# Patient Record
Sex: FEMALE | Race: WHITE | NOT HISPANIC OR LATINO | Employment: OTHER | URBAN - METROPOLITAN AREA
[De-identification: names, ages, dates, MRNs, and addresses within clinical notes are randomized per-mention and may not be internally consistent; named-entity substitution may affect disease eponyms.]

---

## 2017-05-24 ENCOUNTER — ALLSCRIPTS OFFICE VISIT (OUTPATIENT)
Dept: OTHER | Facility: OTHER | Age: 67
End: 2017-05-24

## 2017-05-25 RX ORDER — SODIUM CHLORIDE 9 MG/ML
20 INJECTION, SOLUTION INTRAVENOUS ONCE
Status: DISCONTINUED | OUTPATIENT
Start: 2017-05-30 | End: 2017-06-02 | Stop reason: HOSPADM

## 2017-05-30 ENCOUNTER — HOSPITAL ENCOUNTER (OUTPATIENT)
Dept: INFUSION CENTER | Facility: HOSPITAL | Age: 67
Discharge: HOME | End: 2017-05-30
Payer: COMMERCIAL

## 2017-06-02 RX ORDER — SODIUM CHLORIDE 9 MG/ML
20 INJECTION, SOLUTION INTRAVENOUS ONCE
Status: COMPLETED | OUTPATIENT
Start: 2017-06-05 | End: 2017-06-05

## 2017-06-05 ENCOUNTER — HOSPITAL ENCOUNTER (OUTPATIENT)
Dept: INFUSION CENTER | Facility: HOSPITAL | Age: 67
Discharge: HOME | End: 2017-06-05
Payer: MEDICARE

## 2017-06-05 VITALS
OXYGEN SATURATION: 96 % | DIASTOLIC BLOOD PRESSURE: 75 MMHG | SYSTOLIC BLOOD PRESSURE: 135 MMHG | RESPIRATION RATE: 16 BRPM | TEMPERATURE: 98.3 F | HEART RATE: 67 BPM

## 2017-06-05 PROCEDURE — 96374 THER/PROPH/DIAG INJ IV PUSH: CPT

## 2017-06-05 RX ADMIN — IRON SUCROSE 200 MG: 20 INJECTION, SOLUTION INTRAVENOUS at 11:01

## 2017-06-05 RX ADMIN — SODIUM CHLORIDE 20 ML/HR: 9 INJECTION, SOLUTION INTRAVENOUS at 11:01

## 2017-06-12 ENCOUNTER — HOSPITAL ENCOUNTER (OUTPATIENT)
Dept: INFUSION CENTER | Facility: HOSPITAL | Age: 67
Discharge: HOME | End: 2017-06-12
Payer: MEDICARE

## 2017-06-12 VITALS
OXYGEN SATURATION: 94 % | TEMPERATURE: 97.9 F | HEART RATE: 79 BPM | DIASTOLIC BLOOD PRESSURE: 57 MMHG | RESPIRATION RATE: 16 BRPM | SYSTOLIC BLOOD PRESSURE: 111 MMHG

## 2017-06-12 PROCEDURE — 96374 THER/PROPH/DIAG INJ IV PUSH: CPT

## 2017-06-12 RX ADMIN — IRON SUCROSE 200 MG: 20 INJECTION, SOLUTION INTRAVENOUS at 10:45

## 2017-06-19 ENCOUNTER — HOSPITAL ENCOUNTER (OUTPATIENT)
Dept: INFUSION CENTER | Facility: HOSPITAL | Age: 67
Discharge: HOME | End: 2017-06-19
Payer: MEDICARE

## 2017-06-19 VITALS
HEART RATE: 62 BPM | TEMPERATURE: 96.9 F | SYSTOLIC BLOOD PRESSURE: 129 MMHG | OXYGEN SATURATION: 98 % | RESPIRATION RATE: 16 BRPM | DIASTOLIC BLOOD PRESSURE: 78 MMHG

## 2017-06-19 PROCEDURE — 96374 THER/PROPH/DIAG INJ IV PUSH: CPT

## 2017-06-19 RX ADMIN — IRON SUCROSE 200 MG: 20 INJECTION, SOLUTION INTRAVENOUS at 11:02

## 2017-06-26 ENCOUNTER — APPOINTMENT (EMERGENCY)
Dept: RADIOLOGY | Facility: HOSPITAL | Age: 67
End: 2017-06-26
Payer: MEDICARE

## 2017-06-26 ENCOUNTER — HOSPITAL ENCOUNTER (OUTPATIENT)
Dept: INFUSION CENTER | Facility: HOSPITAL | Age: 67
Discharge: HOME | End: 2017-06-26
Payer: MEDICARE

## 2017-06-26 ENCOUNTER — HOSPITAL ENCOUNTER (EMERGENCY)
Facility: HOSPITAL | Age: 67
Discharge: HOME | End: 2017-06-26
Admitting: EMERGENCY MEDICINE
Payer: MEDICARE

## 2017-06-26 VITALS
DIASTOLIC BLOOD PRESSURE: 86 MMHG | SYSTOLIC BLOOD PRESSURE: 192 MMHG | RESPIRATION RATE: 20 BRPM | HEART RATE: 56 BPM | TEMPERATURE: 97.4 F | OXYGEN SATURATION: 97 %

## 2017-06-26 VITALS
SYSTOLIC BLOOD PRESSURE: 142 MMHG | RESPIRATION RATE: 16 BRPM | DIASTOLIC BLOOD PRESSURE: 69 MMHG | HEART RATE: 50 BPM | TEMPERATURE: 97.5 F | OXYGEN SATURATION: 96 %

## 2017-06-26 DIAGNOSIS — M54.50 ACUTE LUMBAR BACK PAIN: ICD-10-CM

## 2017-06-26 DIAGNOSIS — M48.061 FORAMINAL STENOSIS OF LUMBAR REGION: ICD-10-CM

## 2017-06-26 DIAGNOSIS — M51.26 PROTRUSION OF LUMBAR INTERVERTEBRAL DISC: Primary | ICD-10-CM

## 2017-06-26 PROCEDURE — 99283 EMERGENCY DEPT VISIT LOW MDM: CPT

## 2017-06-26 PROCEDURE — 96372 THER/PROPH/DIAG INJ SC/IM: CPT

## 2017-06-26 PROCEDURE — 96374 THER/PROPH/DIAG INJ IV PUSH: CPT

## 2017-06-26 PROCEDURE — 72131 CT LUMBAR SPINE W/O DYE: CPT

## 2017-06-26 RX ORDER — TRAMADOL HYDROCHLORIDE 50 MG/1
50 TABLET ORAL EVERY 8 HOURS PRN
Qty: 15 TABLET | Refills: 0 | Status: SHIPPED | OUTPATIENT
Start: 2017-06-26 | End: 2017-07-01

## 2017-06-26 RX ORDER — KETOROLAC TROMETHAMINE 30 MG/ML
30 INJECTION, SOLUTION INTRAMUSCULAR; INTRAVENOUS ONCE
Status: COMPLETED | OUTPATIENT
Start: 2017-06-26 | End: 2017-06-26

## 2017-06-26 RX ORDER — CYCLOBENZAPRINE HCL 10 MG
10 TABLET ORAL ONCE
Status: COMPLETED | OUTPATIENT
Start: 2017-06-26 | End: 2017-06-26

## 2017-06-26 RX ORDER — CYCLOBENZAPRINE HCL 10 MG
10 TABLET ORAL 3 TIMES DAILY PRN
Qty: 21 TABLET | Refills: 0 | Status: SHIPPED | OUTPATIENT
Start: 2017-06-26 | End: 2017-08-09

## 2017-06-26 RX ORDER — BACLOFEN 10 MG/1
10 TABLET ORAL DAILY
COMMUNITY
End: 2018-05-17

## 2017-06-26 RX ORDER — PREDNISONE 50 MG/1
50 TABLET ORAL DAILY
Qty: 5 TABLET | Refills: 0 | Status: SHIPPED | OUTPATIENT
Start: 2017-06-26 | End: 2017-07-01

## 2017-06-26 RX ADMIN — KETOROLAC TROMETHAMINE 30 MG: 30 INJECTION, SOLUTION INTRAMUSCULAR at 12:17

## 2017-06-26 RX ADMIN — IRON SUCROSE 200 MG: 20 INJECTION, SOLUTION INTRAVENOUS at 10:40

## 2017-06-26 RX ADMIN — CYCLOBENZAPRINE HYDROCHLORIDE 10 MG: 10 TABLET, FILM COATED ORAL at 12:16

## 2017-06-26 NOTE — PROGRESS NOTES
AFTER VENOFER TREATMENT, PT BROUGHT TO ED TRIAGE VIA WC FOR EVALUATION OF BACK PAIN  IV LEFT IN PLACE PER ED CHARGE NURSEALMA DELIA

## 2017-06-30 ENCOUNTER — ALLSCRIPTS OFFICE VISIT (OUTPATIENT)
Dept: OTHER | Facility: OTHER | Age: 67
End: 2017-06-30

## 2017-07-03 ENCOUNTER — HOSPITAL ENCOUNTER (OUTPATIENT)
Dept: INFUSION CENTER | Facility: HOSPITAL | Age: 67
Discharge: HOME | End: 2017-07-03
Payer: MEDICARE

## 2017-07-03 VITALS
SYSTOLIC BLOOD PRESSURE: 178 MMHG | TEMPERATURE: 98.1 F | DIASTOLIC BLOOD PRESSURE: 79 MMHG | HEART RATE: 71 BPM | RESPIRATION RATE: 16 BRPM | OXYGEN SATURATION: 96 %

## 2017-07-03 PROCEDURE — 96374 THER/PROPH/DIAG INJ IV PUSH: CPT

## 2017-07-03 RX ADMIN — IRON SUCROSE 200 MG: 20 INJECTION, SOLUTION INTRAVENOUS at 14:07

## 2017-07-05 ENCOUNTER — ALLSCRIPTS OFFICE VISIT (OUTPATIENT)
Dept: OTHER | Facility: OTHER | Age: 67
End: 2017-07-05

## 2017-07-05 DIAGNOSIS — G89.4 CHRONIC PAIN SYNDROME: ICD-10-CM

## 2017-07-05 DIAGNOSIS — M51.36 OTHER INTERVERTEBRAL DISC DEGENERATION, LUMBAR REGION: ICD-10-CM

## 2017-07-05 DIAGNOSIS — M48.061 SPINAL STENOSIS OF LUMBAR REGION: ICD-10-CM

## 2017-07-05 DIAGNOSIS — M54.50 LOW BACK PAIN: ICD-10-CM

## 2017-07-05 DIAGNOSIS — M54.16 RADICULOPATHY OF LUMBAR REGION: ICD-10-CM

## 2017-07-10 ENCOUNTER — HOSPITAL ENCOUNTER (OUTPATIENT)
Dept: INFUSION CENTER | Facility: HOSPITAL | Age: 67
Discharge: HOME | End: 2017-07-10
Payer: MEDICARE

## 2017-07-10 VITALS
RESPIRATION RATE: 16 BRPM | TEMPERATURE: 97.5 F | DIASTOLIC BLOOD PRESSURE: 76 MMHG | SYSTOLIC BLOOD PRESSURE: 161 MMHG | OXYGEN SATURATION: 97 % | HEART RATE: 67 BPM

## 2017-07-10 PROCEDURE — 96374 THER/PROPH/DIAG INJ IV PUSH: CPT

## 2017-07-10 RX ADMIN — IRON SUCROSE 200 MG: 20 INJECTION, SOLUTION INTRAVENOUS at 11:07

## 2017-07-13 ENCOUNTER — HOSPITAL ENCOUNTER (OUTPATIENT)
Dept: RADIOLOGY | Facility: HOSPITAL | Age: 67
Setting detail: OUTPATIENT SURGERY
Discharge: HOME | End: 2017-07-13
Payer: MEDICARE

## 2017-07-13 ENCOUNTER — HOSPITAL ENCOUNTER (OUTPATIENT)
Facility: AMBULARY SURGERY CENTER | Age: 67
Setting detail: OUTPATIENT SURGERY
Discharge: HOME | End: 2017-07-13
Attending: ANESTHESIOLOGY | Admitting: ANESTHESIOLOGY
Payer: MEDICARE

## 2017-07-13 VITALS
RESPIRATION RATE: 18 BRPM | DIASTOLIC BLOOD PRESSURE: 94 MMHG | TEMPERATURE: 96.6 F | HEART RATE: 63 BPM | SYSTOLIC BLOOD PRESSURE: 193 MMHG | OXYGEN SATURATION: 96 %

## 2017-07-13 PROBLEM — M51.36 OTHER INTERVERTEBRAL DISC DEGENERATION, LUMBAR REGION: Status: ACTIVE | Noted: 2017-07-13

## 2017-07-13 PROBLEM — M48.061 SPINAL STENOSIS OF LUMBAR REGION: Status: ACTIVE | Noted: 2017-07-13

## 2017-07-13 PROBLEM — M54.16 RADICULOPATHY OF LUMBAR REGION: Status: ACTIVE | Noted: 2017-07-13

## 2017-07-13 PROBLEM — G89.4 CHRONIC PAIN SYNDROME: Status: ACTIVE | Noted: 2017-07-13

## 2017-07-13 PROBLEM — M54.50 LOW BACK PAIN: Status: ACTIVE | Noted: 2017-07-13

## 2017-07-13 PROBLEM — M51.369 OTHER INTERVERTEBRAL DISC DEGENERATION, LUMBAR REGION: Status: ACTIVE | Noted: 2017-07-13

## 2017-07-13 PROCEDURE — 72020 X-RAY EXAM OF SPINE 1 VIEW: CPT

## 2017-07-13 RX ORDER — METHYLPREDNISOLONE ACETATE 80 MG/ML
INJECTION, SUSPENSION INTRA-ARTICULAR; INTRALESIONAL; INTRAMUSCULAR; SOFT TISSUE AS NEEDED
Status: DISCONTINUED | OUTPATIENT
Start: 2017-07-13 | End: 2017-07-13 | Stop reason: HOSPADM

## 2017-07-17 ENCOUNTER — HOSPITAL ENCOUNTER (OUTPATIENT)
Dept: INFUSION CENTER | Facility: HOSPITAL | Age: 67
Discharge: HOME | End: 2017-07-17
Payer: MEDICARE

## 2017-07-17 VITALS
SYSTOLIC BLOOD PRESSURE: 169 MMHG | TEMPERATURE: 97.5 F | OXYGEN SATURATION: 95 % | HEART RATE: 69 BPM | DIASTOLIC BLOOD PRESSURE: 82 MMHG | RESPIRATION RATE: 16 BRPM

## 2017-07-17 PROCEDURE — 96374 THER/PROPH/DIAG INJ IV PUSH: CPT

## 2017-07-17 RX ADMIN — IRON SUCROSE 200 MG: 20 INJECTION, SOLUTION INTRAVENOUS at 11:13

## 2017-07-24 ENCOUNTER — HOSPITAL ENCOUNTER (OUTPATIENT)
Dept: INFUSION CENTER | Facility: HOSPITAL | Age: 67
Discharge: HOME | End: 2017-07-24
Payer: MEDICARE

## 2017-07-24 VITALS
TEMPERATURE: 98.2 F | DIASTOLIC BLOOD PRESSURE: 89 MMHG | RESPIRATION RATE: 16 BRPM | HEART RATE: 60 BPM | OXYGEN SATURATION: 95 % | SYSTOLIC BLOOD PRESSURE: 158 MMHG

## 2017-07-24 PROCEDURE — 96374 THER/PROPH/DIAG INJ IV PUSH: CPT

## 2017-07-24 RX ADMIN — IRON SUCROSE 200 MG: 20 INJECTION, SOLUTION INTRAVENOUS at 11:11

## 2017-08-01 ENCOUNTER — GENERIC CONVERSION - ENCOUNTER (OUTPATIENT)
Dept: OTHER | Facility: OTHER | Age: 67
End: 2017-08-01

## 2017-08-02 ENCOUNTER — GENERIC CONVERSION - ENCOUNTER (OUTPATIENT)
Dept: OTHER | Facility: OTHER | Age: 67
End: 2017-08-02

## 2017-08-08 ENCOUNTER — HOSPITAL ENCOUNTER (OUTPATIENT)
Facility: AMBULARY SURGERY CENTER | Age: 67
Setting detail: OUTPATIENT SURGERY
Discharge: HOME | End: 2017-08-08
Attending: ANESTHESIOLOGY | Admitting: ANESTHESIOLOGY
Payer: MEDICARE

## 2017-08-08 DIAGNOSIS — M54.50 LOW BACK PAIN: ICD-10-CM

## 2017-08-08 DIAGNOSIS — M48.061 LUMBAR STENOSIS: ICD-10-CM

## 2017-08-09 ENCOUNTER — HOSPITAL ENCOUNTER (OUTPATIENT)
Dept: RADIOLOGY | Facility: HOSPITAL | Age: 67
Setting detail: OUTPATIENT SURGERY
Discharge: HOME | End: 2017-08-09
Payer: MEDICARE

## 2017-08-09 ENCOUNTER — HOSPITAL ENCOUNTER (OUTPATIENT)
Dept: RADIOLOGY | Facility: HOSPITAL | Age: 67
Setting detail: OUTPATIENT SURGERY
End: 2017-08-09
Payer: MEDICARE

## 2017-08-09 VITALS
HEART RATE: 54 BPM | RESPIRATION RATE: 18 BRPM | DIASTOLIC BLOOD PRESSURE: 84 MMHG | OXYGEN SATURATION: 96 % | SYSTOLIC BLOOD PRESSURE: 174 MMHG

## 2017-08-09 DIAGNOSIS — M54.50 LOW BACK PAIN: ICD-10-CM

## 2017-08-09 PROCEDURE — 72020 X-RAY EXAM OF SPINE 1 VIEW: CPT

## 2017-08-09 RX ORDER — LIDOCAINE WITH 8.4% SOD BICARB 0.9%(10ML)
SYRINGE (ML) INJECTION AS NEEDED
Status: DISCONTINUED | OUTPATIENT
Start: 2017-08-09 | End: 2017-08-09 | Stop reason: HOSPADM

## 2017-08-09 RX ORDER — METHYLPREDNISOLONE ACETATE 80 MG/ML
INJECTION, SUSPENSION INTRA-ARTICULAR; INTRALESIONAL; INTRAMUSCULAR; SOFT TISSUE AS NEEDED
Status: DISCONTINUED | OUTPATIENT
Start: 2017-08-09 | End: 2017-08-09 | Stop reason: HOSPADM

## 2017-08-17 ENCOUNTER — ALLSCRIPTS OFFICE VISIT (OUTPATIENT)
Dept: OTHER | Facility: OTHER | Age: 67
End: 2017-08-17

## 2017-08-17 ENCOUNTER — GENERIC CONVERSION - ENCOUNTER (OUTPATIENT)
Dept: OTHER | Facility: OTHER | Age: 67
End: 2017-08-17

## 2017-08-24 DIAGNOSIS — D50.9 IRON DEFICIENCY ANEMIA: ICD-10-CM

## 2018-01-12 NOTE — MISCELLANEOUS
Message   Recorded as Task   Date: 07/20/2017 01:09 PM, Created By: Megha Starr   Task Name: Follow Up   Assigned To: [facility] surgery sched,Team   Regarding Patient: Chris Sloan, Status: In Progress   Comment:    ElizabethWhitley - 20 Jul 2017 1:09 PM     TASK CREATED  L/m to return call with % of relief  S/p RT L4 L5 TFESI done 7/13/17 by Dr Yonny Rosado - 20 Jul 2017 1:15 PM     TASK EDITED   GlennWhitley - 26 Jul 2017 1:48 PM     TASK EDITED  12nd attempt  L/m to return call  Elizabeth,Whitley - 28 Jul 2017 9:16 AM     TASK EDITED  3rd attempt  Pls mail letter  Emily Lamb - 01 Aug 2017 9:35 AM     TASK Nette Bond - 01 Aug 2017 2:34 PM     TASK REACTIVATED   Elyssa Ponce - 01 Aug 2017 2:36 PM     TASK EDITED  Pt called and stated at the time of the injection, she received 50% relief  Now pt has same pressure, feels tight and feels like her legs won't hold her  Has cramps in her toes and has the sensation of water dripping on her  Please call [phone]  Ekaterina Vasquez - 01 Aug 2017 2:47 PM     TASK REASSIGNED: Previously Assigned To [facility] es procedure,Team   Surendra Siu - 01 Aug 2017 3:19 PM     TASK REPLIED TO: Previously Assigned To [address] [place] [address], [facility] Clinical,Team  If the patient's pain is the same as prior to the injection, please repeat the injection  If it is different, please have her come in for a follow up appointment  Shari Driscoll - 02 Aug 2017 10:59 AM     TASK EDITED  s/w pt, states she has pain in the small of her back, "Right where it was before " pt states she has "sore, tired" feeling in the top of her R leg - anterior  Cramping in R toes  Pt stated that she had cramping before - more frequent now  pt stated that she has been on her feet at work for 3 hrs, pain is excruciating  Has to sit  Advised pt, will d/w Dr Ramandeep Ash and cb to advise  repeat R L4-L5 TFESI?    [address] - 06 Aug 2017 11:05 AM     TASK REPLIED TO: Previously Assigned To [address]  Please repeat the TFESI  Shari Driscoll - 02 Aug 2017 12:11 PM     TASK REASSIGNED: Previously Assigned To [address]  Please contact pt  No anticoags per allscripts  Craig Elizondo - 02 Aug 2017 12:54 PM     TASK IN PROGRESS   Craig Elizondo - 53 Aug 2017 12:58 PM     TASK EDITED  Called pt, scheduled RT L4 L5 TFESI on 8/10 at [facility]  Also rescheduled follow up visit from 8/10 to 8/17  Went over pre procedure instructions, NPO 1 hr prior, if sick or on abx, needs to call to rs, wear loose, comf clothing, no buttons/zippers, needs   Pt verbalized understanding  Active Problems    1  Abnormal blood chemistry (790 6) (R79 9)   2  Benign essential hypertension (401 1) (I10)   3  Cardiomegaly (429 3) (I51 7)   4  Chronic low back pain (724 2,338 29) (M54 5,G89 29)   5  Chronic pain syndrome (338 4) (G89 4)   6  Depression with anxiety (300 4) (F41 8)   7  Encounter for screening mammogram for malignant neoplasm of breast (V76 12)   (Z12 31)   8  Esophagitis, reflux (530 11) (K21 0)   9  Flu vaccine need (V04 81) (Z23)   10  Hiatal hernia (553 3) (K44 9)   11  Iron deficiency anemia (280 9) (D50 9)   12  Lumbar degenerative disc disease (722 52) (M51 36)   13  Lumbar radiculopathy (724 4) (M54 16)   14  Lumbar spinal stenosis (724 02) (M48 06)   15  Morbid obesity (278 01) (E66 01)   16  Prediabetes (790 29) (R73 09)   17  Sebaceous cyst (706 2) (L72 3)   18  Shortness of breath (786 05) (R06 02)   19  Tension type headache (339 10) (G44 209)   20  Transient ischemic attack (435 9) (G45 9)   21  Winter itch (698 8) (L29 8)    Current Meds   1  AmLODIPine Besylate 5 MG Oral Tablet; take 1 tablet by mouth every day; Therapy: 77ZLQ0578 to (Evaluate:53Lmd3796)  Requested for: 53LUL4556; Last   Rx:09Nov2015; Status: ACTIVE - Renewal Denied Ordered   2  Baclofen 10 MG Oral Tablet; TAKE ONE TABLET EVERY DAY AT BEDTIME; Therapy: 09OEU4750 to Recorded   3  Carafate 1 GM Oral Tablet (Sucralfate);  Take one tab three times daily; Therapy: 02ITK5999 to Recorded   4  Citalopram Hydrobromide 20 MG Oral Tablet; TAKE 1 TABLET DAILY AS DIRECTED; Therapy: 55POL5798 to Recorded   5  Desoximetasone 0 25 % External Cream; APPLY AND GENTLY MASSAGE INTO   AFFECTED AREA(S) TWICE DAILY; Therapy: 29MRE9679 to Recorded   6  Ferrous Sulfate 325 (65 Fe) MG Oral Tablet; TAKE 1 TABLET 3 TIMES DAILY; Therapy: 44CBC4471 to Recorded   7  Medrol 4 MG Oral Tablet (MethylPREDNISolone); USE AS DIRECTED; Therapy: 30YGO9742 to Recorded   8  Metoprolol Tartrate 100 MG Oral Tablet; take 1 tablet twice a day; Therapy: 40TGH3396 to (Evaluate:31Lmb3225)  Requested for: 98RFY4306; Last   Rx:13Okc7164 Ordered   9  Protonix 40 MG Oral Tablet Delayed Release (Pantoprazole Sodium); TAKE 1 TABLET   DAILY; Therapy: 38QIQ8869 to (Evaluate:57Alo1479) Recorded   10  Vitamin D (Ergocalciferol) 74956 UNIT Oral Capsule; TAKE 1 CAPSULE WEEKLY; Therapy: 05GPC5650 to Recorded   11  Zofran 4 MG Oral Tablet (Ondansetron HCl); TAB ONE TAB Q8HRS FOR NAUSEA; Therapy: 43NDO3867 to (Evaluate:52Uad4785) Recorded    Allergies    1   No Known Drug Allergies    Signatures   Electronically signed by : Gulshan Iverson, ; Aug  2 2017 12:58PM EST                       (Author)

## 2018-01-13 VITALS
HEART RATE: 70 BPM | BODY MASS INDEX: 41.59 KG/M2 | TEMPERATURE: 98.1 F | SYSTOLIC BLOOD PRESSURE: 126 MMHG | HEIGHT: 67 IN | DIASTOLIC BLOOD PRESSURE: 85 MMHG | WEIGHT: 265 LBS

## 2018-01-14 VITALS
OXYGEN SATURATION: 97 % | SYSTOLIC BLOOD PRESSURE: 140 MMHG | HEIGHT: 67 IN | BODY MASS INDEX: 42.22 KG/M2 | DIASTOLIC BLOOD PRESSURE: 72 MMHG | TEMPERATURE: 98.2 F | WEIGHT: 269 LBS | HEART RATE: 74 BPM

## 2018-01-14 VITALS
BODY MASS INDEX: 52.61 KG/M2 | RESPIRATION RATE: 16 BRPM | SYSTOLIC BLOOD PRESSURE: 140 MMHG | TEMPERATURE: 98.2 F | OXYGEN SATURATION: 97 % | HEIGHT: 60 IN | WEIGHT: 268 LBS | HEART RATE: 61 BPM | DIASTOLIC BLOOD PRESSURE: 80 MMHG

## 2018-01-15 VITALS — WEIGHT: 260 LBS | BODY MASS INDEX: 40.81 KG/M2 | HEIGHT: 67 IN

## 2018-01-16 NOTE — MISCELLANEOUS
Message   Recorded as Task   Date: 08/16/2017 12:59 PM, Created By: Brice Thompson   Task Name: Follow Up   Assigned To: [address] [address] Procedure,Team   Regarding Patient: Kisha Jones, Status: In Progress   Comment:    Whitley Elizabeth - 16 Aug 2017 12:59 PM     TASK CREATED  L/m to return call with % of relief  S/p RT L4 L5 TFESI  done 8/9/17 b y Dr Cecilia Anderson - 17 Aug 2017 9:50 AM     TASK EDITED   Whitley Elizabeth - 17 Aug 2017 3:43 PM     TASK EDITED  Patient seen in office today  Active Problems    1  Abnormal blood chemistry (790 6) (R79 9)   2  Benign essential hypertension (401 1) (I10)   3  Cardiomegaly (429 3) (I51 7)   4  Chronic low back pain (724 2,338 29) (M54 5,G89 29)   5  Chronic pain syndrome (338 4) (G89 4)   6  Depression with anxiety (300 4) (F41 8)   7  Encounter for screening mammogram for malignant neoplasm of breast (V76 12)   (Z12 31)   8  Esophagitis, reflux (530 11) (K21 0)   9  Flu vaccine need (V04 81) (Z23)   10  Hiatal hernia (553 3) (K44 9)   11  Iron deficiency anemia (280 9) (D50 9)   12  Lumbar degenerative disc disease (722 52) (M51 36)   13  Lumbar radiculopathy (724 4) (M54 16)   14  Lumbar spinal stenosis (724 02) (M48 06)   15  Morbid obesity (278 01) (E66 01)   16  Prediabetes (790 29) (R73 09)   17  Sebaceous cyst (706 2) (L72 3)   18  Shortness of breath (786 05) (R06 02)   19  Tension type headache (339 10) (G44 209)   20  Transient ischemic attack (435 9) (G45 9)   21  Winter itch (698 8) (L29 8)    Current Meds   1  AmLODIPine Besylate 5 MG Oral Tablet; take 1 tablet by mouth every day; Therapy: 12OUW8211 to (Evaluate:58Xfp5064)  Requested for: 78RYZ8103; Last   Rx:09Nov2015; Status: ACTIVE - Renewal Denied Ordered   2  Baclofen 10 MG Oral Tablet; TAKE ONE TABLET EVERY DAY AT BEDTIME; Therapy: 05PXV3343 to Recorded   3  Carafate 1 GM Oral Tablet (Sucralfate); Take one tab three times daily; Therapy: 80USF5787 to Recorded   4   Citalopram Hydrobromide 20 MG Oral Tablet; TAKE 1 TABLET DAILY AS DIRECTED; Therapy: 16YEM6448 to Recorded   5  Desoximetasone 0 25 % External Cream; APPLY AND GENTLY MASSAGE INTO   AFFECTED AREA(S) TWICE DAILY; Therapy: 34BKY4131 to Recorded   6  Ferrous Sulfate 325 (65 Fe) MG Oral Tablet; TAKE 1 TABLET 3 TIMES DAILY; Therapy: 11BQW2410 to Recorded   7  Medrol 4 MG Oral Tablet (MethylPREDNISolone); USE AS DIRECTED; Therapy: 61APV3358 to Recorded   8  Metoprolol Tartrate 100 MG Oral Tablet; take 1 tablet twice a day; Therapy: 82ELX9759 to (Evaluate:72Ibh7244)  Requested for: 27FNY7660; Last   Rx:09Nov2015 Ordered   9  Protonix 40 MG Oral Tablet Delayed Release (Pantoprazole Sodium); TAKE 1 TABLET   DAILY; Therapy: 11QSK8938 to (Evaluate:35Msl2680) Recorded   10  Vitamin D (Ergocalciferol) 43050 UNIT Oral Capsule; TAKE 1 CAPSULE WEEKLY; Therapy: 64LYJ8268 to Recorded   11  Zofran 4 MG Oral Tablet (Ondansetron HCl); TAB ONE TAB Q8HRS FOR NAUSEA; Therapy: 05TCS3048 to (Evaluate:14Bgc6739) Recorded    Allergies    1   No Known Drug Allergies    Signatures   Electronically signed by : Celine Villa MA; Aug 17 2017  3:44PM EST                       (Author)

## 2018-01-17 NOTE — MISCELLANEOUS
Message   Recorded as Task   Date: 07/20/2017 01:09 PM, Created By: Tam Castellon   Task Name: Follow Up   Assigned To: Kamila Harvey   Regarding Patient: Ramona Urena, Status: In Progress   Comment:    Whitley Elizabeth - 20 Jul 2017 1:09 PM     TASK CREATED  L/m to return call with % of relief  S/p RT L4 L5 TFESI done 7/13/17 by Dr Monika York - 20 Jul 2017 1:15 PM     TASK EDITED   GlennWhitley - 26 Jul 2017 1:48 PM     TASK EDITED  12nd attempt  L/m to return call  Whitley Elizabeth - 28 Jul 2017 9:16 AM     TASK EDITED  3rd attempt  Pls mail letter  Letter Sent      Active Problems    1  Abnormal blood chemistry (790 6) (R79 9)   2  Benign essential hypertension (401 1) (I10)   3  Cardiomegaly (429 3) (I51 7)   4  Chronic low back pain (724 2,338 29) (M54 5,G89 29)   5  Chronic pain syndrome (338 4) (G89 4)   6  Depression with anxiety (300 4) (F41 8)   7  Encounter for screening mammogram for malignant neoplasm of breast (V76 12)   (Z12 31)   8  Esophagitis, reflux (530 11) (K21 0)   9  Flu vaccine need (V04 81) (Z23)   10  Hiatal hernia (553 3) (K44 9)   11  Iron deficiency anemia (280 9) (D50 9)   12  Lumbar degenerative disc disease (722 52) (M51 36)   13  Lumbar radiculopathy (724 4) (M54 16)   14  Lumbar spinal stenosis (724 02) (M48 06)   15  Morbid obesity (278 01) (E66 01)   16  Prediabetes (790 29) (R73 09)   17  Sebaceous cyst (706 2) (L72 3)   18  Shortness of breath (786 05) (R06 02)   19  Tension type headache (339 10) (G44 209)   20  Transient ischemic attack (435 9) (G45 9)   21  Winter itch (698 8) (L29 8)    Current Meds   1  AmLODIPine Besylate 5 MG Oral Tablet; take 1 tablet by mouth every day; Therapy: 72YUG8707 to (Evaluate:33Jhf7686)  Requested for: 30MZR2690; Last   Rx:09Nov2015; Status: ACTIVE - Renewal Denied Ordered   2  Baclofen 10 MG Oral Tablet; TAKE ONE TABLET EVERY DAY AT BEDTIME; Therapy: 02PVP4119 to Recorded   3  Carafate 1 GM Oral Tablet (Sucralfate);  Take one tab three times daily; Therapy: 13SKR5294 to Recorded   4  Citalopram Hydrobromide 20 MG Oral Tablet; TAKE 1 TABLET DAILY AS DIRECTED; Therapy: 77SBP3004 to Recorded   5  Desoximetasone 0 25 % External Cream; APPLY AND GENTLY MASSAGE INTO   AFFECTED AREA(S) TWICE DAILY; Therapy: 32KOZ0631 to Recorded   6  Ferrous Sulfate 325 (65 Fe) MG Oral Tablet; TAKE 1 TABLET 3 TIMES DAILY; Therapy: 38HNI0842 to Recorded   7  Medrol 4 MG Oral Tablet (MethylPREDNISolone); USE AS DIRECTED; Therapy: 21RSO7253 to Recorded   8  Metoprolol Tartrate 100 MG Oral Tablet; take 1 tablet twice a day; Therapy: 05GXQ6915 to (Evaluate:03Vwn1691)  Requested for: 16XHN9369; Last   Rx:66Roj8431 Ordered   9  Protonix 40 MG Oral Tablet Delayed Release (Pantoprazole Sodium); TAKE 1 TABLET   DAILY; Therapy: 94FCA1644 to (Evaluate:28Jeo0438) Recorded   10  Vitamin D (Ergocalciferol) 30648 UNIT Oral Capsule; TAKE 1 CAPSULE WEEKLY; Therapy: 56WTO2813 to Recorded   11  Zofran 4 MG Oral Tablet (Ondansetron HCl); TAB ONE TAB Q8HRS FOR NAUSEA; Therapy: 94JTK5287 to (Evaluate:57Quj6973) Recorded    Allergies    1   No Known Drug Allergies    Signatures   Electronically signed by : Jennifer Bunn, ; Aug  1 2017  9:35AM EST                       (Author)

## 2018-02-09 ENCOUNTER — TELEPHONE (OUTPATIENT)
Dept: PAIN MEDICINE | Facility: CLINIC | Age: 68
End: 2018-02-09

## 2018-02-09 NOTE — TELEPHONE ENCOUNTER
Pt called, had RT L4 & L5 TFESI on 8/9  Since then her R leg pain is completely resolved  Now pt is having LEFT side/ leg pain  And her left leg is "giving out"  Pt wants to know if she can have an injection for her LEFT side- pt states she has not been seen for this before- does she need an office visit?

## 2018-02-13 NOTE — TELEPHONE ENCOUNTER
Pt called, was transferred to [facility] schedule line- scheduled office visit for 2/14 (15 min f/u ok per Dr Jaymie Syed)

## 2018-02-14 ENCOUNTER — OFFICE VISIT (OUTPATIENT)
Dept: PAIN MEDICINE | Facility: CLINIC | Age: 68
End: 2018-02-14
Payer: MEDICARE

## 2018-02-14 VITALS
TEMPERATURE: 98.2 F | WEIGHT: 276.6 LBS | RESPIRATION RATE: 18 BRPM | BODY MASS INDEX: 44.45 KG/M2 | HEART RATE: 57 BPM | HEIGHT: 66 IN | DIASTOLIC BLOOD PRESSURE: 86 MMHG | SYSTOLIC BLOOD PRESSURE: 162 MMHG

## 2018-02-14 DIAGNOSIS — M54.5 CHRONIC BILATERAL LOW BACK PAIN, WITH SCIATICA PRESENCE UNSPECIFIED: ICD-10-CM

## 2018-02-14 DIAGNOSIS — G89.29 CHRONIC BILATERAL LOW BACK PAIN, WITH SCIATICA PRESENCE UNSPECIFIED: ICD-10-CM

## 2018-02-14 DIAGNOSIS — M54.16 RADICULOPATHY OF LUMBAR REGION: ICD-10-CM

## 2018-02-14 DIAGNOSIS — G89.4 CHRONIC PAIN SYNDROME: Primary | ICD-10-CM

## 2018-02-14 DIAGNOSIS — M51.36 OTHER INTERVERTEBRAL DISC DEGENERATION, LUMBAR REGION: ICD-10-CM

## 2018-02-14 DIAGNOSIS — M48.061 SPINAL STENOSIS OF LUMBAR REGION, UNSPECIFIED WHETHER NEUROGENIC CLAUDICATION PRESENT: ICD-10-CM

## 2018-02-14 PROBLEM — M54.50 CHRONIC BILATERAL LOW BACK PAIN: Status: ACTIVE | Noted: 2018-02-14

## 2018-02-14 PROCEDURE — 99214 OFFICE O/P EST MOD 30 MIN: CPT | Performed by: ANESTHESIOLOGY

## 2018-02-14 NOTE — PROGRESS NOTES
Pain Medicine Follow-Up Note    Assessment:  1  Chronic pain syndrome    2  Chronic bilateral low back pain, with sciatica presence unspecified    3  Radiculopathy of lumbar region    4  Spinal stenosis of lumbar region, unspecified whether neurogenic claudication present    5  Other intervertebral disc degeneration, lumbar region        Plan:  My impressions and treatment recommendations were discussed in detail with the patient who verbalized understanding and had no further questions  Given that the patient has signs and symptoms low back pain and left lower extremity radiculopathy in what appears to be the left L4 and left L5 distribution in the context of lumbar degenerative disc disease and lumbar spinal stenosis, discussed the rationale of undergoing left L4 and left L5 transforaminal epidural steroid injections since this could be potentially therapeutic  The procedures, its risks, and benefits were explained in detail to the patient  Risks include but are not limited to bleeding, infection, hematoma formation, abscess formation, weakness, headache, failure the pain to improve, nerve irritation or damage, and potential worsening of the pain  The patient verbalized understanding and wished to proceed with the procedure  I also discussed with the patient that if this injection fails to alleviate her pain complaints, I would obtain a new MRI of the lumbar spine to evaluate for any new pathology that may be contributing to her pain complaints  The patient verbalized understanding  Follow-up is planned in 4 weeks time or sooner as warranted  Discharge instructions were provided  I personally saw and examined the patient and I agree with the above discussed plan of care  History of Present Illness:    Wilber Guy is a 74 y o  female who presents to [facility] for interval re-evaluation of the above stated pain complaints   The patient has a past medical and chronic pain history as outlined in the assessment section  She was last seen on August 19, 2017 at which time she underwent right L4 and right L5 transforaminal epidural steroid injections  At today's office visit, the patient's pain score is 4/10 on the verbal numerical pain rating scale  The patient states that her pain is primarily in her low back and left lower extremity what appears to be the left L4 and left L5 distribution  Previously her pain was on the right lower extremity  She does have lumbar degenerative disc disease and lumbar spinal stenosis  She states that her pain is worse in the evening and night and intermittent in nature  She reports the quality of her pain as sharp, throbbing, and pins and needles    She is amenable to undergoing a transforaminal epidural steroid injection  Other than as stated above, the patient denies any interval changes in medications, medical condition, mental condition, symptoms, or allergies since the last office visit  Review of Systems:    Review of Systems   Respiratory: Negative for shortness of breath  Cardiovascular: Negative for chest pain  Gastrointestinal: Negative for constipation, diarrhea, nausea and vomiting  Musculoskeletal: Positive for gait problem (difficulty walking )  Negative for arthralgias, joint swelling and myalgias  Skin: Negative for rash  Neurological: Positive for weakness (muscle)  Negative for dizziness and seizures  All other systems reviewed and are negative          Patient Active Problem List   Diagnosis    Chronic pain syndrome    Low back pain    Radiculopathy of lumbar region    Spinal stenosis of lumbar region    Other intervertebral disc degeneration, lumbar region       Past Medical History:   Diagnosis Date    GERD (gastroesophageal reflux disease)     Hypertension        Past Surgical History:   Procedure Laterality Date    ABDOMINAL SURGERY      EGD AND COLONOSCOPY N/A 7/11/2016    Procedure: EGD AND COLONOSCOPY;  Surgeon: Shirley Delaney MD;  Location[facility] GI LAB; Service:     EPIDURAL BLOCK INJECTION Right 7/13/2017    Procedure: BLOCK / INJECTION EPIDURAL STEROID TRANSFORAMINAL  L4-5;  Surgeon: Tasneem Cunningham MD;  Location: [facility] MAIN OR;  Service: Pain Management     EPIDURAL BLOCK INJECTION Right 8/9/2017    Procedure: BLOCK / INJECTION EPIDURAL STEROID TRANSFORAMINAL L4-5- REPEAT;  Surgeon: Tasneem Cunningham MD;  Location: [facility] MAIN OR;  Service: Pain Management     IN CYSTO/URETERO W/LITHOTRIPSY &INDWELL STENT INSRT Left 7/13/2016    Procedure: CYSTOSCOPY URETEROSCOPY WITH LITHOTRIPSY HOLMIUM LASER, RETROGRADE PYELOGRAM AND INSERTION STENT URETERAL;  Surgeon: Colten Samayoa MD;  Location: [address] [place] [address];  Service: Urology       History reviewed  No pertinent family history  Social History     Occupational History    Not on file  Social History Main Topics    Smoking status: Never Smoker    Smokeless tobacco: Never Used    Alcohol use No    Drug use: No    Sexual activity: Not on file         Current Outpatient Prescriptions:     AMLODIPINE BESYLATE PO, Take 5 mg by mouth daily, Disp: , Rfl:     baclofen 10 mg tablet, Take 10 mg by mouth daily, Disp: , Rfl:     citalopram (CeleXA) 20 mg tablet, Take 20 mg by mouth daily Indications: Depression  , Disp: , Rfl:     metoprolol tartrate (LOPRESSOR) 100 mg tablet, Take 100 mg by mouth 2 (two) times a day Indications: High Blood Pressure , Disp: , Rfl:     Allergies   Allergen Reactions    Vicodin [Hydrocodone-Acetaminophen] GI Intolerance       Physical Exam:    /86 (BP Location: Left arm, Patient Position: Sitting, Cuff Size: Standard)   Pulse 57   Temp 98 2 °F (36 8 °C) (Oral)   Resp 18   Ht 5' 6" (1 676 m)   Wt 125 kg (276 lb 9 6 oz)   BMI 44 64 kg/m²     Constitutional:obese  Eyes:anicteric  HEENT:grossly intact  Neck:supple, symmetric, trachea midline and no masses   Pulmonary:even and unlabored  Cardiovascular:No edema or pitting edema present  Skin:Normal without rashes or lesions and well hydrated  Psychiatric:Mood and affect appropriate  Neurologic:Cranial Nerves II-XII grossly intact  Musculoskeletal:antalgic     Lumbar Spine Exam    Appearance:  Normal lordosis  Palpation/Tenderness:  no tenderness or spasm  Sensory:  no sensory deficits noted  Range of Motion:  Flexion:  Minimally limited  without pain  Extension:  Minimally limited  with pain  Lateral Flexion - Left:  Minimally limited  without pain  Lateral Flexion - Right:  Minimally limited  without pain  Rotation - Left:  Minimally limited  without pain  Rotation - Right:  No limitation and Minimally limited  without pain  Motor Strength:  Left hip flexion:  5/5  Left hip extension:  5/5  Right hip flexion:  5/5  Right hip extension:  5/5  Left knee flexion:  5/5  Left knee extension:  5/5  Right knee flexion:  5/5  Right knee extension:  5/5  Left foot dorsiflexion:  5/5  Left foot plantar flexion:  5/5  Right foot dorsiflexion:  5/5  Right foot plantar flexion:  5/5  Reflexes:  Left Patellar:  2+   Right Patellar:  2+   Left Achilles:  2+   Right Achilles:  2+   Special Tests:  Left Straight Leg Test:  negative  Right Straight Leg Test:  negative  Left Bill's Maneuver:  negative  Right Bill's Maneuver:  negative

## 2018-02-16 ENCOUNTER — HOSPITAL ENCOUNTER (OUTPATIENT)
Dept: RADIOLOGY | Facility: HOSPITAL | Age: 68
Setting detail: OUTPATIENT SURGERY
Discharge: HOME | End: 2018-02-16
Payer: MEDICARE

## 2018-02-16 ENCOUNTER — HOSPITAL ENCOUNTER (OUTPATIENT)
Facility: AMBULARY SURGERY CENTER | Age: 68
Setting detail: OUTPATIENT SURGERY
Discharge: HOME | End: 2018-02-16
Attending: ANESTHESIOLOGY | Admitting: ANESTHESIOLOGY
Payer: MEDICARE

## 2018-02-16 VITALS
RESPIRATION RATE: 16 BRPM | OXYGEN SATURATION: 96 % | TEMPERATURE: 97.1 F | DIASTOLIC BLOOD PRESSURE: 88 MMHG | HEART RATE: 68 BPM | SYSTOLIC BLOOD PRESSURE: 144 MMHG

## 2018-02-16 DIAGNOSIS — Z92.241 S/P EPIDURAL STEROID INJECTION: ICD-10-CM

## 2018-02-16 PROCEDURE — 72100 X-RAY EXAM L-S SPINE 2/3 VWS: CPT

## 2018-02-16 PROCEDURE — 64483 NJX AA&/STRD TFRM EPI L/S 1: CPT | Performed by: ANESTHESIOLOGY

## 2018-02-16 PROCEDURE — 64484 NJX AA&/STRD TFRM EPI L/S EA: CPT | Performed by: ANESTHESIOLOGY

## 2018-02-16 RX ORDER — METHYLPREDNISOLONE ACETATE 80 MG/ML
INJECTION, SUSPENSION INTRA-ARTICULAR; INTRALESIONAL; INTRAMUSCULAR; SOFT TISSUE AS NEEDED
Status: DISCONTINUED | OUTPATIENT
Start: 2018-02-16 | End: 2018-02-16 | Stop reason: HOSPADM

## 2018-02-16 RX ORDER — LIDOCAINE WITH 8.4% SOD BICARB 0.9%(10ML)
SYRINGE (ML) INJECTION AS NEEDED
Status: DISCONTINUED | OUTPATIENT
Start: 2018-02-16 | End: 2018-02-16 | Stop reason: HOSPADM

## 2018-02-16 NOTE — DISCHARGE INSTRUCTIONS
Epidural Steroid Injection   WHAT YOU NEED TO KNOW:   An epidural steroid injection (ROSIE) is a procedure to inject steroid medicine into the epidural space  The epidural space is between your spinal cord and vertebrae  Steroids reduce inflammation and fluid buildup in your spine that may be causing pain  You may be given pain medicine along with the steroids  ACTIVITY  · Do not drive or operate machinery today  · No strenuous activity today - bending, lifting, etc   · You may resume normal activites starting tomorrow - start slowly and as tolerated  · You may shower today, but no tub baths or hot tubs  · You may have numbness for several hours from the local anesthetic  Please use caution and common sense, especially with weight-bearing activities  CARE OF THE INJECTION SITE  · If you have soreness or pain, apply ice to the area today (20 minutes on/20 minutes off)  · Starting tomorrow, you may use warm, moist heat or ice if needed  · You may have an increase or change in your discomfort for 36-48 hours after your treatment  · Apply ice and continue with any pain medication you have been prescribed  · Notify [facility] if you have any of the following: redness, drainage, swelling, headache, stiff neck or fever above 100°F     SPECIAL INSTRUCTIONS  · Our office will contact you in approximately 7 days for a progress report  MEDICATIONS  · Continue to take all routine medications  · Our office may have instructed you to hold some medications  If you have a problem specifically related to your procedure, please call our office at [phone]  Problems not related to your procedure should be directed to your primary care physician

## 2018-02-16 NOTE — OP NOTE
ATTENDING PHYSICIAN:  Kamari Garrett MD     PROCEDURE:  1  Left L4 transforaminal epidural steroid injection under fluoroscopic guidance  2  Left L5 transforaminal epidural steroid injection under fluoroscopic guidance  PRE-PROCEDURE DIAGNOSIS:   Low back pain and left lower extremity radiculopathy  POST-PROCEDURE DIAGNOSIS:  Low back pain and left lower extremity radiculopathy  ANESTHESIA:  Local     ESTIMATED BLOOD LOSS:  Minimal     COMPLICATIONS:  None  LOCATION:  [address]  CONSENT:  Today's procedure, its potential benefits as well as its risks and potential side effects were reviewed  Discussed risks of the procedure including bleeding, infection, nerve irritation or damage, reactions to the medications, weakness, headache, failure of the pain to improve, and potential worsening of the pain were explained to the patient who verbalized understanding and who wished to proceed  Written informed consent was thereby obtained  DESCRIPTION OF THE PROCEDURE:  After written informed consent was obtained, the patient was taken to the fluoroscopy suite and placed in the prone position  Anatomical landmarks were identified by way of fluoroscopy in multiple views  The skin of the lumbar region was prepped using antiseptic and draped in the usual sterile fashion  Strict aseptic technique was utilized  The skin and subcutaneous tissues at the needle entry site were infiltrated with a total of 5 mL of 1% preservative-free lidocaine using a 25-gauge 1-1/2-inch needle  22-gauge needles were then incrementally advanced under fluoroscopic guidance in the oblique view into the neural foramina as mentioned above  Proper placement into each of the neural foramen was confirmed with fluoroscopy in both the lateral and AP views   After negative aspiration for CSF or heme, contrast was injected, which delineated the nerve roots and the epidural space under fluoroscopy in the AP view  There was only a transient pressure paresthesia that resolved immediately upon injection  After negative aspiration, a 2 mL of a 4 mL injectate consisting of 3 mL of preservative-free 0 25% bupivacaine and 1 mL of Depo-Medrol 80 mg/mL was slowly injected into each of the needles as delineated above  The patient tolerated the procedure well and all needles were removed intact  Hemostasis was maintained  There were no apparent paresthesias or complications  The skin was wiped clean and a Band-Aid was placed as appropriate  The patient was monitored for an appropriate period of time and remained hemodynamically stable following the procedure  The patient was ultimately discharged to home with supervision in good condition and instructed to call the office in approximately 7-10 days with an update or sooner as warranted  Discharge instructions were provided  I was present for and participated in all key and critical portions of this procedure      Graham Pat MD  2/16/2018  9:19 AM

## 2018-02-16 NOTE — H&P (VIEW-ONLY)
Pain Medicine Follow-Up Note    Assessment:  1  Chronic pain syndrome    2  Chronic bilateral low back pain, with sciatica presence unspecified    3  Radiculopathy of lumbar region    4  Spinal stenosis of lumbar region, unspecified whether neurogenic claudication present    5  Other intervertebral disc degeneration, lumbar region        Plan:  My impressions and treatment recommendations were discussed in detail with the patient who verbalized understanding and had no further questions  Given that the patient has signs and symptoms low back pain and left lower extremity radiculopathy in what appears to be the left L4 and left L5 distribution in the context of lumbar degenerative disc disease and lumbar spinal stenosis, discussed the rationale of undergoing left L4 and left L5 transforaminal epidural steroid injections since this could be potentially therapeutic  The procedures, its risks, and benefits were explained in detail to the patient  Risks include but are not limited to bleeding, infection, hematoma formation, abscess formation, weakness, headache, failure the pain to improve, nerve irritation or damage, and potential worsening of the pain  The patient verbalized understanding and wished to proceed with the procedure  I also discussed with the patient that if this injection fails to alleviate her pain complaints, I would obtain a new MRI of the lumbar spine to evaluate for any new pathology that may be contributing to her pain complaints  The patient verbalized understanding  Follow-up is planned in 4 weeks time or sooner as warranted  Discharge instructions were provided  I personally saw and examined the patient and I agree with the above discussed plan of care  History of Present Illness:    Charles Fuentes is a 74 y o  female who presents to [facility] for interval re-evaluation of the above stated pain complaints   The patient has a past medical and chronic pain history as outlined in the assessment section  She was last seen on August 19, 2017 at which time she underwent right L4 and right L5 transforaminal epidural steroid injections  At today's office visit, the patient's pain score is 4/10 on the verbal numerical pain rating scale  The patient states that her pain is primarily in her low back and left lower extremity what appears to be the left L4 and left L5 distribution  Previously her pain was on the right lower extremity  She does have lumbar degenerative disc disease and lumbar spinal stenosis  She states that her pain is worse in the evening and night and intermittent in nature  She reports the quality of her pain as sharp, throbbing, and pins and needles    She is amenable to undergoing a transforaminal epidural steroid injection  Other than as stated above, the patient denies any interval changes in medications, medical condition, mental condition, symptoms, or allergies since the last office visit  Review of Systems:    Review of Systems   Respiratory: Negative for shortness of breath  Cardiovascular: Negative for chest pain  Gastrointestinal: Negative for constipation, diarrhea, nausea and vomiting  Musculoskeletal: Positive for gait problem (difficulty walking )  Negative for arthralgias, joint swelling and myalgias  Skin: Negative for rash  Neurological: Positive for weakness (muscle)  Negative for dizziness and seizures  All other systems reviewed and are negative          Patient Active Problem List   Diagnosis    Chronic pain syndrome    Low back pain    Radiculopathy of lumbar region    Spinal stenosis of lumbar region    Other intervertebral disc degeneration, lumbar region       Past Medical History:   Diagnosis Date    GERD (gastroesophageal reflux disease)     Hypertension        Past Surgical History:   Procedure Laterality Date    ABDOMINAL SURGERY      EGD AND COLONOSCOPY N/A 7/11/2016    Procedure: EGD AND COLONOSCOPY;  Surgeon: Eileen Vasquez MD;  Location: [facility]; Service:     EPIDURAL BLOCK INJECTION Right 7/13/2017    Procedure: BLOCK / INJECTION EPIDURAL STEROID TRANSFORAMINAL  L4-5;  Surgeon: Jonelle Brown MD;  Location: [facility] MAIN OR;  Service: Pain Management     EPIDURAL BLOCK INJECTION Right 8/9/2017    Procedure: BLOCK / INJECTION EPIDURAL STEROID TRANSFORAMINAL L4-5- REPEAT;  Surgeon: Jonelle Brown MD;  Location: [facility] MAIN OR;  Service: Pain Management     NC CYSTO/URETERO W/LITHOTRIPSY &INDWELL STENT INSRT Left 7/13/2016    Procedure: CYSTOSCOPY URETEROSCOPY WITH LITHOTRIPSY HOLMIUM LASER, RETROGRADE PYELOGRAM AND INSERTION STENT URETERAL;  Surgeon: Lexi Ramirez MD;  Location: [address] [place] [address];  Service: Urology       History reviewed  No pertinent family history  Social History     Occupational History    Not on file  Social History Main Topics    Smoking status: Never Smoker    Smokeless tobacco: Never Used    Alcohol use No    Drug use: No    Sexual activity: Not on file         Current Outpatient Prescriptions:     AMLODIPINE BESYLATE PO, Take 5 mg by mouth daily, Disp: , Rfl:     baclofen 10 mg tablet, Take 10 mg by mouth daily, Disp: , Rfl:     citalopram (CeleXA) 20 mg tablet, Take 20 mg by mouth daily Indications: Depression  , Disp: , Rfl:     metoprolol tartrate (LOPRESSOR) 100 mg tablet, Take 100 mg by mouth 2 (two) times a day Indications: High Blood Pressure , Disp: , Rfl:     Allergies   Allergen Reactions    Vicodin [Hydrocodone-Acetaminophen] GI Intolerance       Physical Exam:    /86 (BP Location: Left arm, Patient Position: Sitting, Cuff Size: Standard)   Pulse 57   Temp 98 2 °F (36 8 °C) (Oral)   Resp 18   Ht 5' 6" (1 676 m)   Wt 125 kg (276 lb 9 6 oz)   BMI 44 64 kg/m²     Constitutional:obese  Eyes:anicteric  HEENT:grossly intact  Neck:supple, symmetric, trachea midline and no masses   Pulmonary:even and unlabored  Cardiovascular:No edema or pitting edema present  Skin:Normal without rashes or lesions and well hydrated  Psychiatric:Mood and affect appropriate  Neurologic:Cranial Nerves II-XII grossly intact  Musculoskeletal:antalgic     Lumbar Spine Exam    Appearance:  Normal lordosis  Palpation/Tenderness:  no tenderness or spasm  Sensory:  no sensory deficits noted  Range of Motion:  Flexion:  Minimally limited  without pain  Extension:  Minimally limited  with pain  Lateral Flexion - Left:  Minimally limited  without pain  Lateral Flexion - Right:  Minimally limited  without pain  Rotation - Left:  Minimally limited  without pain  Rotation - Right:  No limitation and Minimally limited  without pain  Motor Strength:  Left hip flexion:  5/5  Left hip extension:  5/5  Right hip flexion:  5/5  Right hip extension:  5/5  Left knee flexion:  5/5  Left knee extension:  5/5  Right knee flexion:  5/5  Right knee extension:  5/5  Left foot dorsiflexion:  5/5  Left foot plantar flexion:  5/5  Right foot dorsiflexion:  5/5  Right foot plantar flexion:  5/5  Reflexes:  Left Patellar:  2+   Right Patellar:  2+   Left Achilles:  2+   Right Achilles:  2+   Special Tests:  Left Straight Leg Test:  negative  Right Straight Leg Test:  negative  Left Bill's Maneuver:  negative  Right Bill's Maneuver:  negative

## 2018-02-22 ENCOUNTER — TELEPHONE (OUTPATIENT)
Dept: PAIN MEDICINE | Facility: CLINIC | Age: 68
End: 2018-02-22

## 2018-02-22 NOTE — TELEPHONE ENCOUNTER
Spoke w/ pt  he said she was busy,  ask that she give us a call back when she's available        S/p LEFT L4 AND L5 TRANSFORAMINAL EPIDURAL STEROID INJECTION w/ AS on 2/16/18  No follow up scheduled

## 2018-02-27 NOTE — TELEPHONE ENCOUNTER
Spoke w/ pt she says that for the first 3-4 days she was in pain, then after those days she was feeling a little relief in her legs  She says she is able to walk up and down the stairs she had 30-40% relief  She is having throbbing on her back and she says she is miserable  She does not like the feeling in her back

## 2018-03-15 NOTE — TELEPHONE ENCOUNTER
Pt left message on [facility] voicemail  Attempted to reach pt but got her voicemail  LMOM for pt to call back to schedule f/u visit with Dr Lynn Dave, per his note below

## 2018-04-14 ENCOUNTER — APPOINTMENT (EMERGENCY)
Dept: RADIOLOGY | Facility: HOSPITAL | Age: 68
End: 2018-04-14
Payer: MEDICARE

## 2018-04-14 ENCOUNTER — HOSPITAL ENCOUNTER (EMERGENCY)
Facility: HOSPITAL | Age: 68
Discharge: HOME | End: 2018-04-14
Attending: EMERGENCY MEDICINE | Admitting: EMERGENCY MEDICINE
Payer: MEDICARE

## 2018-04-14 VITALS
RESPIRATION RATE: 18 BRPM | DIASTOLIC BLOOD PRESSURE: 79 MMHG | OXYGEN SATURATION: 97 % | SYSTOLIC BLOOD PRESSURE: 142 MMHG | TEMPERATURE: 98.4 F | HEART RATE: 77 BPM | BODY MASS INDEX: 42.38 KG/M2 | HEIGHT: 67 IN | WEIGHT: 270 LBS
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
## 2018-04-14 DIAGNOSIS — S39.012A LUMBOSACRAL STRAIN, INITIAL ENCOUNTER: ICD-10-CM

## 2018-04-14 DIAGNOSIS — M25.561 BILATERAL KNEE PAIN: ICD-10-CM

## 2018-04-14 DIAGNOSIS — M25.562 BILATERAL KNEE PAIN: ICD-10-CM

## 2018-04-14 DIAGNOSIS — S80.01XA CONTUSION OF RIGHT KNEE, INITIAL ENCOUNTER: ICD-10-CM

## 2018-04-14 PROCEDURE — 72100 X-RAY EXAM L-S SPINE 2/3 VWS: CPT

## 2018-04-14 PROCEDURE — 99283 EMERGENCY DEPT VISIT LOW MDM: CPT

## 2018-04-14 PROCEDURE — 73564 X-RAY EXAM KNEE 4 OR MORE: CPT

## 2018-04-14 RX ORDER — TRAMADOL HYDROCHLORIDE 50 MG/1
50 TABLET ORAL ONCE
Status: COMPLETED | OUTPATIENT
Start: 2018-04-14 | End: 2018-04-14

## 2018-04-14 RX ORDER — LIDOCAINE 50 MG/G
1 PATCH TOPICAL DAILY
Qty: 10 PATCH | Refills: 0 | Status: SHIPPED | OUTPATIENT
Start: 2018-04-14 | End: 2018-05-17

## 2018-04-14 RX ORDER — TRAMADOL HYDROCHLORIDE 50 MG/1
50 TABLET ORAL EVERY 8 HOURS PRN
Qty: 6 TABLET | Refills: 0 | Status: SHIPPED | OUTPATIENT
Start: 2018-04-14 | End: 2018-04-16

## 2018-04-14 RX ORDER — LIDOCAINE 50 MG/G
1 PATCH TOPICAL ONCE
Status: DISCONTINUED | OUTPATIENT
Start: 2018-04-14 | End: 2018-04-14 | Stop reason: HOSPADM

## 2018-04-14 RX ADMIN — TRAMADOL HYDROCHLORIDE 50 MG: 50 TABLET, FILM COATED ORAL at 13:03

## 2018-04-14 RX ADMIN — LIDOCAINE 1 PATCH: 50 PATCH TOPICAL at 13:04

## 2018-04-14 NOTE — DISCHARGE INSTRUCTIONS
Arthralgia   WHAT YOU NEED TO KNOW:   Arthralgia is pain in one or more joints, with no inflammation  It may be short-term and get better within 6 to 8 weeks  Arthralgia can be an early sign of arthritis  Arthralgia may be caused by a medical condition, such as a hormone disorder or a tumor  It may also be caused by an infection or injury  DISCHARGE INSTRUCTIONS:   Medicines: The following medicines may  be ordered for you:  · Acetaminophen  decreases pain  Ask how much to take and how often to take it  Follow directions  Acetaminophen can cause liver damage if not taken correctly  · NSAIDs  decrease pain and prevent swelling  Ask your healthcare provider which medicine is right for you  Ask how much to take and when to take it  Take as directed  NSAIDs can cause stomach bleeding and kidney problems if not taken correctly  · Pain relief cream  decreases pain  Use this cream as directed  · Take your medicine as directed  Contact your healthcare provider if you think your medicine is not helping or if you have side effects  Tell him of her if you are allergic to any medicine  Keep a list of the medicines, vitamins, and herbs you take  Include the amounts, and when and why you take them  Bring the list or the pill bottles to follow-up visits  Carry your medicine list with you in case of an emergency  Follow up with your healthcare provider or specialist as directed:  Write down your questions so you remember to ask them during your visits  Self-care:   · Apply heat  to help decrease pain  Use a heating pad or heat wrap  Apply heat for 20 to 30 minutes every 2 hours for as many days as directed  · Rest  as much as possible  Avoid activities that cause joint pain  · Apply ice  to help decrease swelling and pain  Ice may also help prevent tissue damage  Use an ice pack, or put crushed ice in a plastic bag   Cover it with a towel and place it on your painful joint for 15 to 20 minutes every hour or as directed  · Support  the joint with a brace or elastic wrap as directed  · Elevate  your joint above the level of your heart as often as you can to help decrease swelling and pain  Prop your painful joint on pillows or blankets to keep it elevated comfortably  · Lose weight  if you are overweight  Extra weight can put pressure on your joints and cause more pain  Ask your healthcare provider how much you should weigh  Ask him to help you create a weight loss plan  · Exercise  regularly to help improve joint movement and to decrease pain  Ask about the best exercise plan for you  Low-impact exercises can help take the pressure off your joints  Examples are walking, swimming, and water aerobics  Physical therapy:  A physical therapist teaches you exercises to help improve movement and strength, and to decrease pain  Ask your healthcare provider if physical therapy is right for you  Contact your healthcare provider or specialist if:   · You have a fever  · You continue to have joint pain that cannot be relieved with heat, ice, or medicine  · You have pain and inflammation around your joint  · You have questions or concerns about your condition or care  Return to the emergency department if:   · You have sudden, severe pain when you move your joint  · You have a fever and shaking chills  · You cannot move your joint  · You lose feeling on the side of your body where you have the painful joint  © 2017 [employer]  Information is for End User's use only and may not be sold, redistributed or otherwise used for commercial purposes  All illustrations and images included in CareNotes® are the copyrighted property of [employer]  or [employer]  The above information is an  only  It is not intended as medical advice for individual conditions or treatments   Talk to your doctor, nurse or pharmacist before following any medical regimen to see if it is safe and effective for you  Contusion in Adults   WHAT YOU NEED TO KNOW:   A contusion is a bruise that appears on your skin after an injury  A bruise happens when small blood vessels tear but skin does not  When blood vessels tear, blood leaks into nearby tissue, such as soft tissue or muscle  DISCHARGE INSTRUCTIONS:   Return to the emergency department if:   · You have new trouble moving the injured area  · You have tingling or numbness in or near the injured area  · Your hand or foot below the bruise gets cold or turns pale  Contact your healthcare provider if:   · You find a new lump in the injured area  · Your symptoms do not improve with treatment after 4 to 5 days  · You have questions or concerns about your condition or care  Medicines: You may need any of the following:  · NSAIDs  help decrease swelling and pain or fever  This medicine is available with or without a doctor's order  NSAIDs can cause stomach bleeding or kidney problems in certain people  If you take blood thinner medicine, always ask your healthcare provider if NSAIDs are safe for you  Always read the medicine label and follow directions  · Prescription pain medicine  may be given  Do not wait until the pain is severe before you take your medicine  · Take your medicine as directed  Contact your healthcare provider if you think your medicine is not helping or if you have side effects  Tell him of her if you are allergic to any medicine  Keep a list of the medicines, vitamins, and herbs you take  Include the amounts, and when and why you take them  Bring the list or the pill bottles to follow-up visits  Carry your medicine list with you in case of an emergency  Follow up with your healthcare provider as directed: You may need to return within a week to check your injury again  Write down your questions so you remember to ask them during your visits    Help a contusion heal:   · Rest the injured area  or use it less than usual  If you bruised your leg or foot, you may need crutches or a cane to help you walk  This will help you keep weight off your injured body part  · Apply ice  to decrease swelling and pain  Ice may also help prevent tissue damage  Use an ice pack, or put crushed ice in a plastic bag  Cover it with a towel and place it on your bruise for 15 to 20 minutes every hour or as directed  · Use compression  to support the area and decrease swelling  Wrap an elastic bandage around the area over the bruised muscle  Make sure the bandage is not too tight  You should be able to fit 1 finger between the bandage and your skin  · Elevate (raise) your injured body part  above the level of your heart to help decrease pain and swelling  Use pillows, blankets, or rolled towels to elevate the area as often as you can  · Do not drink alcohol  as directed  Alcohol may slow healing  · Do not stretch injured muscles  right after your injury  Ask your healthcare provider when and how you may safely stretch after your injury  Gentle stretches can help increase your flexibility  · Do not massage the area or put heating pads  on the bruise right after your injury  Heat and massage may slow healing  Your healthcare provider may tell you to apply heat after several days  At that time, heat will start to help the injury heal   Prevent another contusion:   · Stretch and warm up before you play sports or exercise  · Wear protective gear when you play sports  Examples are shin guards and padding  · If you begin a new physical activity, start slowly to give your body a chance to adjust   © 2017 [employer] Information is for End User's use only and may not be sold, redistributed or otherwise used for commercial purposes  All illustrations and images included in CareNotes® are the copyrighted property of [employer]  or [employer]    The above information is an  only  It is not intended as medical advice for individual conditions or treatments  Talk to your doctor, nurse or pharmacist before following any medical regimen to see if it is safe and effective for you

## 2018-04-14 NOTE — ED PROVIDER NOTES
History  Chief Complaint   Patient presents with    Back Pain     tripped going up steps yesterday afternoon,c/o low back pain,has bruise on rt leg by the knee,feels like left leg is giving out,whole body hurting today     Patient is a 80-year-old female, presenting today with acute on chronic lower back pain that is nonradiating that began yesterday after fall where she tripped and fell onto outstretched hands  States that she landed directly onto her knees  Was able to get up afterwards and ambulate however with some pain  Will occasionally use a walker however has not needed this in some time  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 Taking? AMLODIPINE BESYLATE PO 4/13/2018 at 2100  Yes No   Sig: Take 5 mg by mouth daily   baclofen 10 mg tablet 4/13/2018 at 2100  Yes No   Sig: Take 10 mg by mouth daily   citalopram (CeleXA) 20 mg tablet 4/13/2018 at 2100  Yes No   Sig: Take 20 mg by mouth daily Indications: Depression  metoprolol tartrate (LOPRESSOR) 100 mg tablet 4/13/2018 at 2100 Self Yes No   Sig: Take 100 mg by mouth 2 (two) times a day Indications: High Blood Pressure  Facility-Administered Medications: None       Past Medical History:   Diagnosis Date    Chronic pain     GERD (gastroesophageal reflux disease)     Hypertension        Past Surgical History:   Procedure Laterality Date    ABDOMINAL SURGERY      EGD AND COLONOSCOPY N/A 7/11/2016    Procedure: EGD AND COLONOSCOPY;  Surgeon: Lea Ascencio MD;  Location: [facility] GI LAB;   Service:     EPIDURAL BLOCK INJECTION Right 7/13/2017    Procedure: BLOCK / INJECTION EPIDURAL STEROID TRANSFORAMINAL  L4-5;  Surgeon: Sunny Chong MD;  Location: [place] [address] MAIN OR;  Service: Pain Management     EPIDURAL BLOCK INJECTION Right 8/9/2017    Procedure: BLOCK / INJECTION EPIDURAL STEROID TRANSFORAMINAL L4-5- REPEAT;  Surgeon: Sunny Chong MD;  Location: [place] [address] MAIN OR;  Service: Pain Management     EPIDURAL BLOCK INJECTION Left 2/16/2018    Procedure: LEFT L4 AND L5 TRANSFORAMINAL EPIDURAL STEROID INJECTION;  Surgeon: Sunny Chong MD;  Location: [place] [address] MAIN OR;  Service: Pain Management     RI CYSTO/URETERO W/LITHOTRIPSY &INDWELL STENT INSRT Left 7/13/2016    Procedure: CYSTOSCOPY URETEROSCOPY WITH LITHOTRIPSY HOLMIUM LASER, RETROGRADE PYELOGRAM AND INSERTION STENT URETERAL;  Surgeon: Katie Cody MD;  Location: [address] [place] [address];  Service: Urology       History reviewed  No pertinent family history  I have reviewed and agree with the history as documented  Social History   Substance Use Topics    Smoking status: Never Smoker    Smokeless tobacco: Never Used    Alcohol use No        Review of Systems   Constitutional: Negative  Negative for chills, fever and unexpected weight change  Able to walk without difficulty  Denies IV drug use     HENT: Negative  Eyes: Negative  Respiratory: Negative  Negative for cough, chest tightness, shortness of breath and wheezing  Cardiovascular: Negative  Negative for chest pain and palpitations  Gastrointestinal: Negative  Negative for abdominal pain, constipation, diarrhea, nausea and vomiting  Genitourinary: Negative  Negative for difficulty urinating, dysuria, flank pain, frequency, hematuria and urgency  Denies numbness, tingling in the groin  Musculoskeletal: Positive for arthralgias, back pain and joint swelling  Negative for gait problem, myalgias, neck pain and neck stiffness  Skin: Positive for color change  Negative for pallor, rash and wound  Neurological: Negative    Negative for dizziness, tremors, weakness, light-headedness, numbness and headaches  Denies numbness  Denies shooting pain down arms/ legs  All other systems reviewed and are negative  Physical Exam  ED Triage Vitals [04/14/18 1211]   Temperature Pulse Respirations Blood Pressure SpO2   98 4 °F (36 9 °C) 77 18 142/79 97 %      Temp Source Heart Rate Source Patient Position - Orthostatic VS BP Location FiO2 (%)   Tympanic Monitor -- -- --      Pain Score       9           Orthostatic Vital Signs  Vitals:    04/14/18 1211   BP: 142/79   Pulse: 77       Physical Exam   Constitutional: She is oriented to person, place, and time  She appears well-developed and well-nourished  HENT:   Head: Normocephalic and atraumatic  Eyes: Conjunctivae and EOM are normal  Pupils are equal, round, and reactive to light  Cardiovascular: Normal rate, regular rhythm, normal heart sounds and intact distal pulses  Pulmonary/Chest: Effort normal and breath sounds normal    spo2 is 97% indicating adequate oxygenation  Abdominal: Soft  Bowel sounds are normal    Musculoskeletal:        Arms:       Legs:  Neurological: She is alert and oriented to person, place, and time  Skin: Skin is warm and dry  Capillary refill takes less than 2 seconds  Nursing note and vitals reviewed  ED Medications  Medications   lidocaine (LIDODERM) 5 % patch 1 patch (1 patch Transdermal Medication Applied 4/14/18 1304)   traMADol (ULTRAM) tablet 50 mg (50 mg Oral Given 4/14/18 1303)       Diagnostic Studies  Results Reviewed     None                 XR knee 4+ views left injury   ED Interpretation by Inessa Lo PA-C (04/14 1426)   No acute osseous abnormality  Final Result by Nelia Carrizales MD (04/14 1424)      No acute osseous abnormality  Workstation performed: [id number]         XR lumbar spine 2 or 3 views   Final Result by Nelia Carrizales MD (04/14 1424)      No acute fracture           Workstation performed: [id number]         XR knee 4+ views Right injury   Final Result by Tony Pacheco MD (04/14 1423)      No acute osseous abnormality  Workstation performed: [id number]                    Procedures  Procedures       Phone Contacts  ED Phone Contact    ED Course  ED Course as of Apr 14 1436   Sat Apr 14, 2018   1359 Called for reading                                 MDM  Number of Diagnoses or Management Options  Bilateral knee pain:   Contusion of right knee, initial encounter:   Fall, initial encounter:   Lumbosacral strain, initial encounter:   Diagnosis management comments: Spoke with patient regarding xrays  Ambulating in ED without difficulty  Will have pt f/u with ortho  Informed not to drive or operate heavy machinery while taking tramadol  Patient is informed to return to the emergency department for worsening of symptoms and was given proper education regarding their diagnosis and symptoms  Otherwise the patient is informed to follow up with their primary care doctor for re-evaluation  The patient verbalizes understanding and agrees with above assessment and plan  All questions were answered  Please Note: Fluency Direct voice recognition software may have been used in the creation of this document  Wrong words or sound a like substitutions may have occurred due to the inherent limitations of the voice software             Amount and/or Complexity of Data Reviewed  Tests in the radiology section of CPT®: reviewed and ordered  Review and summarize past medical records: yes  Independent visualization of images, tracings, or specimens: yes      CritCare Time    Disposition  Final diagnoses:   Fall, initial encounter   Lumbosacral strain, initial encounter   Bilateral knee pain   Contusion of right knee, initial encounter     Time reflects when diagnosis was documented in both MDM as applicable and the Disposition within this note     Time User Action Codes Description Comment    4/14/2018  2:33 PM Ann Golden [I75  UUVE] Fall, initial encounter     2018  2:33 PM Valeria Bearden Add [[id number]] Lumbosacral strain, initial encounter     2018  2:33 PM Valeria Bearden Add [Q38 [id number],  M25 [id number]] Bilateral knee pain     2018  2:34 PM Valeria Bearden Add [S80 [id number]] Contusion of right knee, initial encounter       ED Disposition     ED Disposition Condition Comment    Discharge  [facility] [address] [facility] [address] discharge to home/self care  Condition at discharge: Good        Follow-up Information     Follow up With Specialties Details Why Contact Info Additional [phone] Emergency Department Emergency Medicine Go to If symptoms worsen [address] [place] [address]  [phone] [facility] ED, Maryann [place], [place], [address]    Trisha Apgar, MD Internal Medicine Schedule an appointment as soon as possible for a visit As needed [address] [place] [address]  [phone]           Patient's Medications   Discharge Prescriptions    LIDOCAINE (LIDODERM) 5 %    Place 1 patch on the skin daily for 10 days Remove & Discard patch within 12 hours or as directed by MD       Start Date: 2018 End Date: 2018       Order Dose: 1 patch       Quantity: 10 patch    Refills: 0    TRAMADOL (ULTRAM) 50 MG TABLET    Take 1 tablet (50 mg total) by mouth every 8 (eight) hours as needed for moderate pain for up to 2 days       Start Date: 2018 End Date: 2018       Order Dose: 50 mg       Quantity: 6 tablet    Refills: 0     No discharge procedures on file      ED Provider  Electronically Signed by           Hung Marquez PA-C  [phone]

## 2018-05-17 ENCOUNTER — HOSPITAL ENCOUNTER (EMERGENCY)
Facility: HOSPITAL | Age: 68
Discharge: HOME | End: 2018-05-17
Attending: EMERGENCY MEDICINE | Admitting: EMERGENCY MEDICINE
Payer: MEDICARE

## 2018-05-17 ENCOUNTER — APPOINTMENT (EMERGENCY)
Dept: RADIOLOGY | Facility: HOSPITAL | Age: 68
End: 2018-05-17
Payer: MEDICARE

## 2018-05-17 VITALS
WEIGHT: 273 LBS | HEART RATE: 52 BPM | BODY MASS INDEX: 42.85 KG/M2 | SYSTOLIC BLOOD PRESSURE: 166 MMHG | TEMPERATURE: 98.5 F | DIASTOLIC BLOOD PRESSURE: 80 MMHG | OXYGEN SATURATION: 97 % | HEIGHT: 67 IN | RESPIRATION RATE: 18 BRPM

## 2018-05-17 DIAGNOSIS — N20.0 KIDNEY STONE ON LEFT SIDE: Primary | ICD-10-CM

## 2018-05-17 DIAGNOSIS — N39.0 UTI (URINARY TRACT INFECTION): ICD-10-CM

## 2018-05-17 LAB
ALBUMIN SERPL BCP-MCNC: 3.2 G/DL (ref 3.5–5)
ALP SERPL-CCNC: 133 U/L (ref 46–116)
ALT SERPL W P-5'-P-CCNC: 40 U/L (ref 12–78)
AMORPH URATE CRY URNS QL MICRO: ABNORMAL /HPF
ANION GAP SERPL CALCULATED.3IONS-SCNC: 9 MMOL/L (ref 4–13)
APTT PPP: 24 SECONDS (ref 24–33)
AST SERPL W P-5'-P-CCNC: 40 U/L (ref 5–45)
BACTERIA UR QL AUTO: ABNORMAL /HPF
BASOPHILS # BLD AUTO: 0.05 THOUSANDS/ΜL (ref 0–0.1)
BASOPHILS NFR BLD AUTO: 1 % (ref 0–1)
BILIRUB SERPL-MCNC: 0.5 MG/DL (ref 0.2–1)
BILIRUB UR QL STRIP: NEGATIVE
BUN SERPL-MCNC: 20 MG/DL (ref 5–25)
CALCIUM SERPL-MCNC: 8.3 MG/DL (ref 8.3–10.1)
CHLORIDE SERPL-SCNC: 105 MMOL/L (ref 100–108)
CLARITY UR: ABNORMAL
CO2 SERPL-SCNC: 26 MMOL/L (ref 21–32)
COLOR UR: ABNORMAL
CREAT SERPL-MCNC: 0.82 MG/DL (ref 0.6–1.3)
EOSINOPHIL # BLD AUTO: 0.28 THOUSAND/ΜL (ref 0–0.61)
EOSINOPHIL NFR BLD AUTO: 4 % (ref 0–6)
ERYTHROCYTE [DISTWIDTH] IN BLOOD BY AUTOMATED COUNT: 12.4 % (ref 11.6–15.1)
GFR SERPL CREATININE-BSD FRML MDRD: 75 ML/MIN/1.73SQ M
GLUCOSE SERPL-MCNC: 126 MG/DL (ref 65–140)
GLUCOSE UR STRIP-MCNC: NEGATIVE MG/DL
HCT VFR BLD AUTO: 40 % (ref 34.8–46.1)
HGB BLD-MCNC: 13.1 G/DL (ref 11.5–15.4)
HGB UR QL STRIP.AUTO: NEGATIVE
IMM GRANULOCYTES # BLD AUTO: 0.02 THOUSAND/UL (ref 0–0.2)
IMM GRANULOCYTES NFR BLD AUTO: 0 % (ref 0–2)
INR PPP: 0.98 (ref 0.86–1.16)
KETONES UR STRIP-MCNC: NEGATIVE MG/DL
LEUKOCYTE ESTERASE UR QL STRIP: ABNORMAL
LYMPHOCYTES # BLD AUTO: 1.04 THOUSANDS/ΜL (ref 0.6–4.47)
LYMPHOCYTES NFR BLD AUTO: 13 % (ref 14–44)
MCH RBC QN AUTO: 30.6 PG (ref 26.8–34.3)
MCHC RBC AUTO-ENTMCNC: 32.8 G/DL (ref 31.4–37.4)
MCV RBC AUTO: 94 FL (ref 82–98)
MONOCYTES # BLD AUTO: 0.6 THOUSAND/ΜL (ref 0.17–1.22)
MONOCYTES NFR BLD AUTO: 7 % (ref 4–12)
NEUTROPHILS # BLD AUTO: 6.08 THOUSANDS/ΜL (ref 1.85–7.62)
NEUTS SEG NFR BLD AUTO: 75 % (ref 43–75)
NITRITE UR QL STRIP: NEGATIVE
NON-SQ EPI CELLS URNS QL MICRO: ABNORMAL /HPF
NRBC BLD AUTO-RTO: 0 /100 WBCS
PH UR STRIP.AUTO: 6 [PH] (ref 5–9)
PLATELET # BLD AUTO: 211 THOUSANDS/UL (ref 149–390)
PMV BLD AUTO: 9.8 FL (ref 8.9–12.7)
POTASSIUM SERPL-SCNC: 3.6 MMOL/L (ref 3.5–5.3)
PROT SERPL-MCNC: 6.3 G/DL (ref 6.4–8.2)
PROT UR STRIP-MCNC: NEGATIVE MG/DL
PROTHROMBIN TIME: 10.3 SECONDS (ref 9.4–11.7)
RBC # BLD AUTO: 4.28 MILLION/UL (ref 3.81–5.12)
RBC #/AREA URNS AUTO: ABNORMAL /HPF
SODIUM SERPL-SCNC: 140 MMOL/L (ref 136–145)
SP GR UR STRIP.AUTO: 1.01 (ref 1–1.03)
UROBILINOGEN UR QL STRIP.AUTO: 1 E.U./DL
WBC # BLD AUTO: 8.07 THOUSAND/UL (ref 4.31–10.16)
WBC #/AREA URNS AUTO: ABNORMAL /HPF

## 2018-05-17 PROCEDURE — 85610 PROTHROMBIN TIME: CPT | Performed by: EMERGENCY MEDICINE

## 2018-05-17 PROCEDURE — 81001 URINALYSIS AUTO W/SCOPE: CPT | Performed by: EMERGENCY MEDICINE

## 2018-05-17 PROCEDURE — 96375 TX/PRO/DX INJ NEW DRUG ADDON: CPT

## 2018-05-17 PROCEDURE — 99284 EMERGENCY DEPT VISIT MOD MDM: CPT

## 2018-05-17 PROCEDURE — 80053 COMPREHEN METABOLIC PANEL: CPT | Performed by: EMERGENCY MEDICINE

## 2018-05-17 PROCEDURE — 85730 THROMBOPLASTIN TIME PARTIAL: CPT | Performed by: EMERGENCY MEDICINE

## 2018-05-17 PROCEDURE — 74176 CT ABD & PELVIS W/O CONTRAST: CPT

## 2018-05-17 PROCEDURE — 96365 THER/PROPH/DIAG IV INF INIT: CPT

## 2018-05-17 PROCEDURE — 85025 COMPLETE CBC W/AUTO DIFF WBC: CPT | Performed by: EMERGENCY MEDICINE

## 2018-05-17 PROCEDURE — 36415 COLL VENOUS BLD VENIPUNCTURE: CPT | Performed by: EMERGENCY MEDICINE

## 2018-05-17 PROCEDURE — 87086 URINE CULTURE/COLONY COUNT: CPT | Performed by: EMERGENCY MEDICINE

## 2018-05-17 PROCEDURE — 96361 HYDRATE IV INFUSION ADD-ON: CPT

## 2018-05-17 RX ORDER — TRAMADOL HYDROCHLORIDE 50 MG/1
50 TABLET ORAL EVERY 8 HOURS PRN
Qty: 15 TABLET | Refills: 0 | Status: SHIPPED | OUTPATIENT
Start: 2018-05-17 | End: 2018-05-27

## 2018-05-17 RX ORDER — TAMSULOSIN HYDROCHLORIDE 0.4 MG/1
0.4 CAPSULE ORAL ONCE
Status: COMPLETED | OUTPATIENT
Start: 2018-05-17 | End: 2018-05-17

## 2018-05-17 RX ORDER — TAMSULOSIN HYDROCHLORIDE 0.4 MG/1
0.4 CAPSULE ORAL
Qty: 4 CAPSULE | Refills: 0 | Status: SHIPPED | OUTPATIENT
Start: 2018-05-17 | End: 2021-07-16 | Stop reason: HOSPADM

## 2018-05-17 RX ORDER — METOPROLOL TARTRATE 5 MG/5ML
10 INJECTION INTRAVENOUS ONCE
Status: COMPLETED | OUTPATIENT
Start: 2018-05-17 | End: 2018-05-17

## 2018-05-17 RX ORDER — KETOROLAC TROMETHAMINE 30 MG/ML
15 INJECTION, SOLUTION INTRAMUSCULAR; INTRAVENOUS ONCE
Status: COMPLETED | OUTPATIENT
Start: 2018-05-17 | End: 2018-05-17

## 2018-05-17 RX ORDER — CEPHALEXIN 500 MG/1
500 CAPSULE ORAL EVERY 8 HOURS SCHEDULED
Qty: 21 CAPSULE | Refills: 0 | Status: SHIPPED | OUTPATIENT
Start: 2018-05-17 | End: 2018-05-24

## 2018-05-17 RX ORDER — ACETAMINOPHEN 325 MG/1
650 TABLET ORAL ONCE
Status: COMPLETED | OUTPATIENT
Start: 2018-05-17 | End: 2018-05-17

## 2018-05-17 RX ADMIN — SODIUM CHLORIDE 1000 ML: 0.9 INJECTION, SOLUTION INTRAVENOUS at 13:20

## 2018-05-17 RX ADMIN — METOPROLOL TARTRATE 10 MG: 5 INJECTION, SOLUTION INTRAVENOUS at 18:19

## 2018-05-17 RX ADMIN — KETOROLAC TROMETHAMINE 15 MG: 30 INJECTION, SOLUTION INTRAMUSCULAR at 13:37

## 2018-05-17 RX ADMIN — ACETAMINOPHEN 650 MG: 325 TABLET, FILM COATED ORAL at 18:50

## 2018-05-17 RX ADMIN — TAMSULOSIN HYDROCHLORIDE 0.4 MG: 0.4 CAPSULE ORAL at 18:19

## 2018-05-17 RX ADMIN — CEFTRIAXONE 1000 MG: 1 INJECTION, SOLUTION INTRAVENOUS at 17:42

## 2018-05-17 NOTE — ED PROVIDER NOTES
History  Chief Complaint   Patient presents with    Flank Pain     Patient states she started with stabbing pain on her LT side that radiates towards the front lower abdomen area,habing difficulty urinating,not much urine output,has history of kidney stones     80-year-old female presents to the ER with complaint of left flank pain for the past 5 days  Patient does have history of kidney stones  Patient states that her pain feels similar to her previous kidney stone pain  Patient came to the ER today as the pain is now radiating into left lower quadrant abdomen  Patient also notes having difficulty with her urination  Patient denies any fevers, chills, nausea, vomiting, diarrhea, chest pain, shortness of breath, headaches, weakness, dizziness  History provided by:  Patient  Flank Pain   Associated symptoms: no chest pain, no chills, no constipation, no cough, no diarrhea, no dysuria, no fever, no nausea and no shortness of breath        Prior to Admission Medications   Prescriptions Last Dose Informant Patient Reported? Taking? AMLODIPINE BESYLATE PO   Yes Yes   Sig: Take 5 mg by mouth daily   citalopram (CeleXA) 20 mg tablet   Yes Yes   Sig: Take 20 mg by mouth daily Indications: Depression  metoprolol tartrate (LOPRESSOR) 100 mg tablet  Self Yes Yes   Sig: Take 100 mg by mouth 2 (two) times a day Indications: High Blood Pressure  Facility-Administered Medications: None       Past Medical History:   Diagnosis Date    Chronic pain     GERD (gastroesophageal reflux disease)     Hypertension     Kidney stones        Past Surgical History:   Procedure Laterality Date    ABDOMINAL SURGERY      EGD AND COLONOSCOPY N/A 7/11/2016    Procedure: EGD AND COLONOSCOPY;  Surgeon: Princess Contreras MD;  Location: [facility] GI LAB;   Service:     EPIDURAL BLOCK INJECTION Right 7/13/2017    Procedure: BLOCK / INJECTION EPIDURAL STEROID TRANSFORAMINAL  L4-5;  Surgeon: Rocco Bui MD;  Location: [facility] MAIN OR;  Service: Pain Management     EPIDURAL BLOCK INJECTION Right 8/9/2017    Procedure: BLOCK / INJECTION EPIDURAL STEROID TRANSFORAMINAL L4-5- REPEAT;  Surgeon: Rocco Bui MD;  Location: [place] [address] MAIN OR;  Service: Pain Management     EPIDURAL BLOCK INJECTION Left 2/16/2018    Procedure: LEFT L4 AND L5 TRANSFORAMINAL EPIDURAL STEROID INJECTION;  Surgeon: Rocco Bui MD;  Location: [place] [address] MAIN OR;  Service: Pain Management     ND CYSTO/URETERO W/LITHOTRIPSY &INDWELL STENT INSRT Left 7/13/2016    Procedure: CYSTOSCOPY URETEROSCOPY WITH LITHOTRIPSY HOLMIUM LASER, RETROGRADE PYELOGRAM AND INSERTION STENT URETERAL;  Surgeon: Manfred Buchanan MD;  Location: [address] [place] [address];  Service: Urology       History reviewed  No pertinent family history  I have reviewed and agree with the history as documented  Social History   Substance Use Topics    Smoking status: Never Smoker    Smokeless tobacco: Never Used    Alcohol use No        Review of Systems   Constitutional: Negative for activity change, appetite change, chills and fever  HENT: Negative for congestion and ear pain  Eyes: Negative for pain and discharge  Respiratory: Negative for cough, chest tightness, shortness of breath, wheezing and stridor  Cardiovascular: Negative for chest pain and palpitations  Gastrointestinal: Negative for abdominal distention, abdominal pain, constipation, diarrhea and nausea  Endocrine: Negative for cold intolerance  Genitourinary: Positive for decreased urine volume and flank pain  Negative for dysuria, frequency and urgency  Musculoskeletal: Negative for arthralgias and back pain  Skin: Negative for color change and rash  Allergic/Immunologic: Negative for environmental allergies and food allergies  Neurological: Negative for dizziness, weakness, numbness and headaches  Hematological: Negative for adenopathy  Psychiatric/Behavioral: Negative for agitation, behavioral problems and confusion   The patient is not nervous/anxious  All other systems reviewed and are negative  Physical Exam  ED Triage Vitals   Temperature Pulse Respirations Blood Pressure SpO2   05/17/18 1259 05/17/18 1259 05/17/18 1259 05/17/18 1259 05/17/18 1259   98 5 °F (36 9 °C) 68 18 163/80 96 %      Temp Source Heart Rate Source Patient Position - Orthostatic VS BP Location FiO2 (%)   05/17/18 1259 05/17/18 1259 05/17/18 1259 05/17/18 1259 --   Tympanic Monitor Sitting Right arm       Pain Score       05/17/18 1335       5           Orthostatic Vital Signs  Vitals:    05/17/18 1545 05/17/18 1741 05/17/18 1813 05/17/18 1830   BP: (!) 189/88 (!) 190/85 (!) 200/91 (!) 172/91   Pulse:  (!) 50 (!) 51 (!) 52   Patient Position - Orthostatic VS:  Lying Sitting Sitting       Physical Exam   Constitutional: She is oriented to person, place, and time  She appears well-developed and well-nourished  HENT:   Head: Normocephalic and atraumatic  Mouth/Throat: Oropharynx is clear and moist    Eyes: Conjunctivae and EOM are normal    Neck: Normal range of motion  Neck supple  Cardiovascular: Normal rate, regular rhythm, normal heart sounds and intact distal pulses  Pulmonary/Chest: Effort normal and breath sounds normal    Abdominal: Soft  Bowel sounds are normal  She exhibits no distension  There is tenderness  Tenderness to palpation noted on the left side of abdomen  Musculoskeletal: Normal range of motion  Moderate tenderness to palpation noted to the right CVA region  Neurological: She is alert and oriented to person, place, and time  Skin: Skin is warm and dry  Psychiatric: She has a normal mood and affect  Her behavior is normal  Judgment and thought content normal    Nursing note and vitals reviewed        ED Medications  Medications   sodium chloride 0 9 % bolus 1,000 mL (0 mL Intravenous Stopped 5/17/18 1534)   ketorolac (TORADOL) injection 15 mg (15 mg Intravenous Given 5/17/18 1337)   cefTRIAXone (ROCEPHIN) IVPB (premix) 1,000 mg (0 mg Intravenous Stopped 5/17/18 1812)   tamsulosin (FLOMAX) capsule 0 4 mg (0 4 mg Oral Given 5/17/18 1819)   metoprolol (LOPRESSOR) injection 10 mg (10 mg Intravenous Given 5/17/18 1819)   acetaminophen (TYLENOL) tablet 650 mg (650 mg Oral Given 5/17/18 1850)       Diagnostic Studies  Results Reviewed     Procedure Component Value Units Date/Time    Urine Microscopic [[id number]]  (Abnormal) Collected:  05/17/18 1619    Lab Status:  Final result Specimen:  Urine from Urine, Clean Catch Updated:  05/17/18 1644     RBC, UA 2-4 (A) /hpf      WBC, UA 10-20 (A) /hpf      Epithelial Cells Moderate (A) /hpf      Bacteria, UA Moderate (A) /hpf      AMORPH URATES Moderate /hpf     Urine culture [[id number]] Collected:  05/17/18 1619    Lab Status:   In process Specimen:  Urine from Urine, Clean Catch Updated:  05/17/18 1644    UA w Reflex to Microscopic w Reflex to Culture [[id number]]  (Abnormal) Collected:  05/17/18 1619    Lab Status:  Final result Specimen:  Urine from Urine, Clean Catch Updated:  05/17/18 1627     Color, UA Light Yellow     Clarity, UA Cloudy     Specific [place], UA 1 010     pH, UA 6 0     Leukocytes, UA Moderate (A)     Nitrite, UA Negative     Protein, UA Negative mg/dl      Glucose, UA Negative mg/dl      Ketones, UA Negative mg/dl      Urobilinogen, UA 1 0 E U /dl      Bilirubin, UA Negative     Blood, UA Negative    Comprehensive metabolic panel [[id number]]  (Abnormal) Collected:  05/17/18 1320    Lab Status:  Final result Specimen:  Blood from Hand, Right Updated:  05/17/18 1343     Sodium 140 mmol/L      Potassium 3 6 mmol/L      Chloride 105 mmol/L      CO2 26 mmol/L      Anion Gap 9 mmol/L      BUN 20 mg/dL      Creatinine 0 82 mg/dL      Glucose 126 mg/dL      Calcium 8 3 mg/dL      AST 40 U/L      ALT 40 U/L      Alkaline Phosphatase 133 (H) U/L      Total Protein 6 3 (L) g/dL      Albumin 3 2 (L) g/dL      Total Bilirubin 0 50 mg/dL      eGFR 75 ml/min/1 73sq m     Narrative: National Kidney Disease Education Program recommendations are as follows:  GFR calculation is accurate only with a steady state creatinine  Chronic Kidney disease less than 60 ml/min/1 73 sq  meters  Kidney failure less than 15 ml/min/1 73 sq  meters  Protime-INR [[id number]]  (Normal) Collected:  05/17/18 1320    Lab Status:  Final result Specimen:  Blood from Hand, Right Updated:  05/17/18 1342     Protime 10 3 seconds      INR 0 98    APTT [[id number]]  (Normal) Collected:  05/17/18 1320    Lab Status:  Final result Specimen:  Blood from Hand, Right Updated:  05/17/18 1342     PTT 24 seconds     CBC and differential [[id number]]  (Abnormal) Collected:  05/17/18 1320    Lab Status:  Final result Specimen:  Blood from Hand, Right Updated:  05/17/18 1328     WBC 8 07 Thousand/uL      RBC 4 28 Million/uL      Hemoglobin 13 1 g/dL      Hematocrit 40 0 %      MCV 94 fL      MCH 30 6 pg      MCHC 32 8 g/dL      RDW 12 4 %      MPV 9 8 fL      Platelets 732 Thousands/uL      nRBC 0 /100 WBCs      Neutrophils Relative 75 %      Immat GRANS % 0 %      Lymphocytes Relative 13 (L) %      Monocytes Relative 7 %      Eosinophils Relative 4 %      Basophils Relative 1 %      Neutrophils Absolute 6 08 Thousands/µL      Immature Grans Absolute 0 02 Thousand/uL      Lymphocytes Absolute 1 04 Thousands/µL      Monocytes Absolute 0 60 Thousand/µL      Eosinophils Absolute 0 28 Thousand/µL      Basophils Absolute 0 05 Thousands/µL                  CT renal stone study abdomen pelvis without contrast   Final Result by Rogelio Morris MD (05/17 1716)      Questionable 1 to 2 mm calculus identified in the distal left ureter, with no upstream dilatation of the collecting system        No other renal calculi are seen      No acute inflammatory changes within the abdomen or pelvis               Workstation performed: [id number]                    Procedures  Procedures       Phone Contacts  ED Phone Contact    ED Course MDM  Number of Diagnoses or Management Options  Kidney stone on left side: new and requires workup  UTI (urinary tract infection): new and requires workup  Diagnosis management comments: Obtain blood work, UA, CT abdomen/pelvis renal stone protocol to rule out kidney stone  Give IV fluids, pain medication and reassess symptoms       Amount and/or Complexity of Data Reviewed  Clinical lab tests: ordered and reviewed  Tests in the radiology section of CPT®: ordered and reviewed  Tests in the medicine section of CPT®: ordered and reviewed  Independent visualization of images, tracings, or specimens: yes    Risk of Complications, Morbidity, and/or Mortality  General comments: UA shows concern for UTI  Patient's abdominal pain improved in the ER with IV Toradol  CT scan shows concern for questionable 1 to 2 mm calculus identified in the distal left ureter, with no upstream dilatation of the collecting system  At this point patient is started on Keflex and Flomax and discharged home with follow-up to her own urologist, Dr Anton Avery in the morning  Patient is given a strainer to strain her urine  Patient noted to have elevated blood pressure in the ER before discharge which patient notes is due to her not taking her afternoon medications  Patient's blood pressure improved with Lopressor in the ER  Close return instructions given to return to the ER for any worsening symptoms  Patient agrees with discharge plan  Patient well appearing at time of discharge  Patient is discharged home on p r n  ibuprofen or Ultram for pain          Patient Progress  Patient progress: improved    CritCare Time    Disposition  Final diagnoses:   Kidney stone on left side   UTI (urinary tract infection)     Time reflects when diagnosis was documented in both MDM as applicable and the Disposition within this note     Time User Action Codes Description Comment    5/17/2018  6:40 PM Gordon Hidalgo Add [N20 0] Kidney stone on left side     5/17/2018  6:42 PM Tino Shay Add [N39 0] UTI (urinary tract infection)       ED Disposition     ED Disposition Condition Comment    Discharge  Karin Ezequiel discharge to home/self care  Condition at discharge: Good        Follow-up Information     Follow up With Specialties Details Why Tatyana Quinonez MD Urology Call in 1 day  [address]  [address] [place] [address]  [phone]          Patient's Medications   Discharge Prescriptions    CEPHALEXIN (KEFLEX) 500 MG CAPSULE    Take 1 capsule (500 mg total) by mouth every 8 (eight) hours for 7 days       Start Date: 5/17/2018 End Date: 5/24/2018       Order Dose: 500 mg       Quantity: 21 capsule    Refills: 0    TAMSULOSIN (FLOMAX) 0 4 MG    Take 1 capsule (0 4 mg total) by mouth daily with dinner for 4 days       Start Date: 5/17/2018 End Date: 5/21/2018       Order Dose: 0 4 mg       Quantity: 4 capsule    Refills: 0    TRAMADOL (ULTRAM) 50 MG TABLET    Take 1 tablet (50 mg total) by mouth every 8 (eight) hours as needed for moderate pain for up to 10 days       Start Date: 5/17/2018 End Date: 5/27/2018       Order Dose: 50 mg       Quantity: 15 tablet    Refills: 0     No discharge procedures on file      ED Provider  Electronically Signed by           Jimena Flores DO  05/17/18 9863

## 2018-05-17 NOTE — DISCHARGE INSTRUCTIONS
Please take a list of all of your medications and discharge paperwork with you to all of your follow-up medical visits  Please take all of your medications as directed  Please call your family doctor or return to the ER if you have increased shortness of breath, chest pain, fevers, chills, nausea, vomiting, diarrhea, or any other worsening symptoms  Urinary Tract Infection in Women   WHAT YOU NEED TO KNOW:   A urinary tract infection (UTI) is caused by bacteria that get inside your urinary tract  Most bacteria that enter your urinary tract come out when you urinate  If the bacteria stay in your urinary tract, you may get an infection  Your urinary tract includes your kidneys, ureters, bladder, and urethra  Urine is made in your kidneys, and it flows from the ureters to the bladder  Urine leaves the bladder through the urethra  A UTI is more common in your lower urinary tract, which includes your bladder and urethra  DISCHARGE INSTRUCTIONS:   Return to the emergency department if:   · You are urinating very little or not at all  · You have a high fever with shaking chills  · You have side or back pain that gets worse  Contact your healthcare provider if:   · You have a fever  · You do not feel better after 2 days of taking antibiotics  · You are vomiting  · You have questions or concerns about your condition or care  Medicines:   · Antibiotics  help fight a bacterial infection  · Medicines  may be given to decrease pain and burning when you urinate  They will also help decrease the feeling that you need to urinate often  These medicines will make your urine orange or red  · Take your medicine as directed  Contact your healthcare provider if you think your medicine is not helping or if you have side effects  Tell him or her if you are allergic to any medicine  Keep a list of the medicines, vitamins, and herbs you take  Include the amounts, and when and why you take them  Bring the list or the pill bottles to follow-up visits  Carry your medicine list with you in case of an emergency  Follow up with your healthcare provider as directed:  Write down your questions so you remember to ask them during your visits  Prevent another UTI:   · Empty your bladder often  Urinate and empty your bladder as soon as you feel the need  Do not hold your urine for long periods of time  · Wipe from front to back after you urinate or have a bowel movement  This will help prevent germs from getting into your urinary tract through your urethra  · Drink liquids as directed  Ask how much liquid to drink each day and which liquids are best for you  You may need to drink more liquids than usual to help flush out the bacteria  Do not drink alcohol, caffeine, or citrus juices  These can irritate your bladder and increase your symptoms  Your healthcare provider may recommend cranberry juice to help prevent a UTI  · Urinate after you have sex  This can help flush out bacteria passed during sex  · Do not douche or use feminine deodorants  These can change the chemical balance in your vagina  · Change sanitary pads or tampons often  This will help prevent germs from getting into your urinary tract  · Do pelvic muscle exercises often  Pelvic muscle exercises may help you start and stop urinating  Strong pelvic muscles may help you empty your bladder easier  Squeeze these muscles tightly for 5 seconds like you are trying to hold back urine  Then relax for 5 seconds  Gradually work up to squeezing for 10 seconds  Do 3 sets of 15 repetitions a day, or as directed  © 2017 [address] [employer] Information is for End User's use only and may not be sold, redistributed or otherwise used for commercial purposes  All illustrations and images included in CareNotes® are the copyrighted property of [employer]  or [employer]  The above information is an  only  It is not intended as medical advice for individual conditions or treatments  Talk to your doctor, nurse or pharmacist before following any medical regimen to see if it is safe and effective for you  Kidney Stones   AMBULATORY CARE:   Kidney stones  form in the urinary system when the water and waste in your urine are out of balance  When this happens, certain types of waste crystals separate from the urine  The crystals build up and form kidney stones  Kidney stones can be made of uric acid, calcium, phosphate, or oxalate crystals  You may have 1 or more kidney stones  Common symptoms include the following:   · Pain in the middle of your back that moves across to your side or that may spread to your groin    · Nausea and vomiting    · Urge to urinate often, burning feeling when you urinate, or pink or red urine    · Tenderness in your lower back, side, or stomach  Seek care immediately if:   · You have vomiting that is not relieved by medicine  Contact your healthcare provider if:   · You have a fever  · You have trouble passing urine  · You see blood in your urine  · You have severe pain  · You have any questions or concerns about your condition or care  Treatment for kidney stones  may include any of the following:  · NSAIDs , such as ibuprofen, help decrease swelling, pain, and fever  This medicine is available with or without a doctor's order  NSAIDs can cause stomach bleeding or kidney problems in certain people  If you take blood thinner medicine, always ask your healthcare provider if NSAIDs are safe for you  Always read the medicine label and follow directions  · Prescription medicine  may be given  Ask how to take this medicine safely  · Medicines  to balance your electrolytes may be needed  · A procedure or surgery  to remove the kidney stones may be needed if they do not pass on their own  Your treatment will depend on the size and location of your kidney stones    Manage your symptoms:   · Drink plenty of liquids  Your healthcare provider may tell you to drink at least 8 to 12 (eight-ounce) cups of liquids each day  This helps flush out the kidney stones when you urinate  Water is the best liquid to drink  · Strain your urine every time you go to the bathroom  Urinate through a strainer or a piece of thin cloth to catch the stones  Take the stones to your healthcare provider so they can be sent to the lab for tests  This will help your healthcare providers plan the best treatment for you  · Eat a variety of healthy foods  Healthy foods include fruits, vegetables, whole-grain breads, low-fat dairy products, beans, and fish  You may need to limit how much sodium (salt) or protein you eat  Ask for information about the best foods for you  · Exercise regularly  Your stones may pass more easily if you stay active  Ask about the best activities for you  After you pass your kidney stones:  Once you have passed your kidney stones, your healthcare provider may  order a 24-hour urine test  Results from a 24-hour urine test will help your healthcare provider plan ways to prevent more stones from forming  If you are told to do a 24-hour test, your healthcare provider will give you more instructions  Follow up with your healthcare provider as directed:  Write down your questions so you remember to ask them during your visits  © 2017 [address] [employer] Information is for End User's use only and may not be sold, redistributed or otherwise used for commercial purposes  All illustrations and images included in CareNotes® are the copyrighted property of [employer]  or [employer]  The above information is an  only  It is not intended as medical advice for individual conditions or treatments  Talk to your doctor, nurse or pharmacist before following any medical regimen to see if it is safe and effective for you

## 2018-05-18 LAB — BACTERIA UR CULT: NORMAL

## 2018-08-02 NOTE — LETTER
February 14, 2018     Jm Clarke MD  [address] [facility] [address]    Patient: Cory Beaver   YOB: 1950   Date of Visit: 2/14/2018       Dear Dr Katy Bower: Thank you for referring Cory Beaver to me for evaluation  Below are my notes for this consultation  If you have questions, please do not hesitate to call me  I look forward to following your patient along with you  Sincerely,        Cecy Posey MD        CC: No Recipients  Cecy Posey MD  2/14/2018 10:01 AM  Sign at close encounter  Pain Medicine Follow-Up Note    Assessment:  1  Chronic pain syndrome    2  Chronic bilateral low back pain, with sciatica presence unspecified    3  Radiculopathy of lumbar region    4  Spinal stenosis of lumbar region, unspecified whether neurogenic claudication present    5  Other intervertebral disc degeneration, lumbar region        Plan:  My impressions and treatment recommendations were discussed in detail with the patient who verbalized understanding and had no further questions  Given that the patient has signs and symptoms low back pain and left lower extremity radiculopathy in what appears to be the left L4 and left L5 distribution in the context of lumbar degenerative disc disease and lumbar spinal stenosis, discussed the rationale of undergoing left L4 and left L5 transforaminal epidural steroid injections since this could be potentially therapeutic  The procedures, its risks, and benefits were explained in detail to the patient  Risks include but are not limited to bleeding, infection, hematoma formation, abscess formation, weakness, headache, failure the pain to improve, nerve irritation or damage, and potential worsening of the pain  The patient verbalized understanding and wished to proceed with the procedure      I also discussed with the patient that if this injection fails to alleviate her pain complaints, I would obtain a new MRI of the lumbar spine to evaluate for any new pathology that may be contributing to her pain complaints  The patient verbalized understanding  Follow-up is planned in 4 weeks time or sooner as warranted  Discharge instructions were provided  I personally saw and examined the patient and I agree with the above discussed plan of care  History of Present Illness:    Dawn Rashid is a 74 y o  female who presents to [facility] for interval re-evaluation of the above stated pain complaints  The patient has a past medical and chronic pain history as outlined in the assessment section  She was last seen on August 19, 2017 at which time she underwent right L4 and right L5 transforaminal epidural steroid injections  At today's office visit, the patient's pain score is 4/10 on the verbal numerical pain rating scale  The patient states that her pain is primarily in her low back and left lower extremity what appears to be the left L4 and left L5 distribution  Previously her pain was on the right lower extremity  She does have lumbar degenerative disc disease and lumbar spinal stenosis  She states that her pain is worse in the evening and night and intermittent in nature  She reports the quality of her pain as sharp, throbbing, and pins and needles    She is amenable to undergoing a transforaminal epidural steroid injection  Other than as stated above, the patient denies any interval changes in medications, medical condition, mental condition, symptoms, or allergies since the last office visit  Review of Systems:    Review of Systems   Respiratory: Negative for shortness of breath  Cardiovascular: Negative for chest pain  Gastrointestinal: Negative for constipation, diarrhea, nausea and vomiting  Musculoskeletal: Positive for gait problem (difficulty walking )  Negative for arthralgias, joint swelling and myalgias  Skin: Negative for rash     Neurological: Positive for weakness (muscle)  Negative for dizziness and seizures  All other systems reviewed and are negative  Patient Active Problem List   Diagnosis    Chronic pain syndrome    Low back pain    Radiculopathy of lumbar region    Spinal stenosis of lumbar region    Other intervertebral disc degeneration, lumbar region       Past Medical History:   Diagnosis Date    GERD (gastroesophageal reflux disease)     Hypertension        Past Surgical History:   Procedure Laterality Date    ABDOMINAL SURGERY      EGD AND COLONOSCOPY N/A 7/11/2016    Procedure: EGD AND COLONOSCOPY;  Surgeon: Logan Bains MD;  Location: [facility] GI LAB; Service:     EPIDURAL BLOCK INJECTION Right 7/13/2017    Procedure: BLOCK / INJECTION EPIDURAL STEROID TRANSFORAMINAL  L4-5;  Surgeon: Monae Spaulding MD;  Location: [facility] MAIN OR;  Service: Pain Management     EPIDURAL BLOCK INJECTION Right 8/9/2017    Procedure: BLOCK / INJECTION EPIDURAL STEROID TRANSFORAMINAL L4-5- REPEAT;  Surgeon: Monae Spaulding MD;  Location: [facility] MAIN OR;  Service: Pain Management     MN CYSTO/URETERO W/LITHOTRIPSY &INDWELL STENT INSRT Left 7/13/2016    Procedure: CYSTOSCOPY URETEROSCOPY WITH LITHOTRIPSY HOLMIUM LASER, RETROGRADE PYELOGRAM AND INSERTION STENT URETERAL;  Surgeon: Marcia Kam MD;  Location: [address] [place] [address];  Service: Urology       History reviewed  No pertinent family history  Social History     Occupational History    Not on file  Social History Main Topics    Smoking status: Never Smoker    Smokeless tobacco: Never Used    Alcohol use No    Drug use: No    Sexual activity: Not on file         Current Outpatient Prescriptions:     AMLODIPINE BESYLATE PO, Take 5 mg by mouth daily, Disp: , Rfl:     baclofen 10 mg tablet, Take 10 mg by mouth daily, Disp: , Rfl:     citalopram (CeleXA) 20 mg tablet, Take 20 mg by mouth daily Indications: Depression  , Disp: , Rfl:     metoprolol tartrate (LOPRESSOR) 100 mg tablet, Take 100 mg by mouth 2 (two) times a day Indications: High Blood Pressure , Disp: , Rfl:     Allergies   Allergen Reactions    Vicodin [Hydrocodone-Acetaminophen] GI Intolerance       Physical Exam:    /86 (BP Location: Left arm, Patient Position: Sitting, Cuff Size: Standard)   Pulse 57   Temp 98 2 °F (36 8 °C) (Oral)   Resp 18   Ht 5' 6" (1 676 m)   Wt 125 kg (276 lb 9 6 oz)   BMI 44 64 kg/m²      Constitutional:obese  Eyes:anicteric  HEENT:grossly intact  Neck:supple, symmetric, trachea midline and no masses   Pulmonary:even and unlabored  Cardiovascular:No edema or pitting edema present  Skin:Normal without rashes or lesions and well hydrated  Psychiatric:Mood and affect appropriate  Neurologic:Cranial Nerves II-XII grossly intact  Musculoskeletal:antalgic     Lumbar Spine Exam    Appearance:  Normal lordosis  Palpation/Tenderness:  no tenderness or spasm  Sensory:  no sensory deficits noted  Range of Motion:  Flexion:  Minimally limited  without pain  Extension:  Minimally limited  with pain  Lateral Flexion - Left:  Minimally limited  without pain  Lateral Flexion - Right:  Minimally limited  without pain  Rotation - Left:  Minimally limited  without pain  Rotation - Right:  No limitation and Minimally limited  without pain  Motor Strength:  Left hip flexion:  5/5  Left hip extension:  5/5  Right hip flexion:  5/5  Right hip extension:  5/5  Left knee flexion:  5/5  Left knee extension:  5/5  Right knee flexion:  5/5  Right knee extension:  5/5  Left foot dorsiflexion:  5/5  Left foot plantar flexion:  5/5  Right foot dorsiflexion:  5/5  Right foot plantar flexion:  5/5  Reflexes:  Left Patellar:  2+   Right Patellar:  2+   Left Achilles:  2+   Right Achilles:  2+   Special Tests:  Left Straight Leg Test:  negative  Right Straight Leg Test:  negative  Left Bill's Maneuver:  negative  Right Bill's Maneuver:  negative No

## 2020-09-10 ENCOUNTER — TRANSCRIBE ORDERS (OUTPATIENT)
Dept: ADMINISTRATIVE | Facility: HOSPITAL | Age: 70
End: 2020-09-10

## 2020-09-10 DIAGNOSIS — R52 PAIN: Primary | ICD-10-CM

## 2020-09-18 ENCOUNTER — HOSPITAL ENCOUNTER (OUTPATIENT)
Dept: RADIOLOGY | Facility: HOSPITAL | Age: 70
Discharge: HOME | End: 2020-09-18
Attending: INTERNAL MEDICINE
Payer: COMMERCIAL

## 2020-09-18 DIAGNOSIS — R52 PAIN: ICD-10-CM

## 2020-09-18 PROCEDURE — 93971 EXTREMITY STUDY: CPT | Performed by: SURGERY

## 2020-09-18 PROCEDURE — 93971 EXTREMITY STUDY: CPT

## 2020-11-27 ENCOUNTER — HOSPITAL ENCOUNTER (EMERGENCY)
Facility: HOSPITAL | Age: 70
Discharge: HOME | End: 2020-11-27
Attending: EMERGENCY MEDICINE
Payer: COMMERCIAL

## 2020-11-27 ENCOUNTER — APPOINTMENT (EMERGENCY)
Dept: RADIOLOGY | Facility: HOSPITAL | Age: 70
End: 2020-11-27
Payer: COMMERCIAL

## 2020-11-27 VITALS
OXYGEN SATURATION: 95 % | DIASTOLIC BLOOD PRESSURE: 78 MMHG | HEIGHT: 67 IN | HEART RATE: 58 BPM | TEMPERATURE: 97.9 F | WEIGHT: 277.2 LBS | RESPIRATION RATE: 18 BRPM | BODY MASS INDEX: 43.51 KG/M2 | SYSTOLIC BLOOD PRESSURE: 170 MMHG

## 2020-11-27 DIAGNOSIS — M79.89 LEG SWELLING: ICD-10-CM

## 2020-11-27 DIAGNOSIS — M79.605 LEFT LEG PAIN: Primary | ICD-10-CM

## 2020-11-27 DIAGNOSIS — L03.90 CELLULITIS: ICD-10-CM

## 2020-11-27 LAB
ANION GAP SERPL CALCULATED.3IONS-SCNC: 5 MMOL/L (ref 4–13)
BASOPHILS # BLD AUTO: 0.06 THOUSANDS/ΜL (ref 0–0.1)
BASOPHILS NFR BLD AUTO: 1 % (ref 0–1)
BUN SERPL-MCNC: 16 MG/DL (ref 5–25)
CALCIUM SERPL-MCNC: 8.9 MG/DL (ref 8.3–10.1)
CHLORIDE SERPL-SCNC: 105 MMOL/L (ref 100–108)
CO2 SERPL-SCNC: 30 MMOL/L (ref 21–32)
CREAT SERPL-MCNC: 0.77 MG/DL (ref 0.6–1.3)
EOSINOPHIL # BLD AUTO: 0.28 THOUSAND/ΜL (ref 0–0.61)
EOSINOPHIL NFR BLD AUTO: 4 % (ref 0–6)
ERYTHROCYTE [DISTWIDTH] IN BLOOD BY AUTOMATED COUNT: 13.2 % (ref 11.6–15.1)
GFR SERPL CREATININE-BSD FRML MDRD: 80 ML/MIN/1.73SQ M
GLUCOSE SERPL-MCNC: 86 MG/DL (ref 65–140)
HCT VFR BLD AUTO: 39.7 % (ref 34.8–46.1)
HGB BLD-MCNC: 12.3 G/DL (ref 11.5–15.4)
IMM GRANULOCYTES # BLD AUTO: 0.02 THOUSAND/UL (ref 0–0.2)
IMM GRANULOCYTES NFR BLD AUTO: 0 % (ref 0–2)
LACTATE SERPL-SCNC: 0.4 MMOL/L (ref 0.5–2)
LYMPHOCYTES # BLD AUTO: 1.01 THOUSANDS/ΜL (ref 0.6–4.47)
LYMPHOCYTES NFR BLD AUTO: 16 % (ref 14–44)
MCH RBC QN AUTO: 28.4 PG (ref 26.8–34.3)
MCHC RBC AUTO-ENTMCNC: 31 G/DL (ref 31.4–37.4)
MCV RBC AUTO: 92 FL (ref 82–98)
MONOCYTES # BLD AUTO: 0.5 THOUSAND/ΜL (ref 0.17–1.22)
MONOCYTES NFR BLD AUTO: 8 % (ref 4–12)
NEUTROPHILS # BLD AUTO: 4.57 THOUSANDS/ΜL (ref 1.85–7.62)
NEUTS SEG NFR BLD AUTO: 71 % (ref 43–75)
NRBC BLD AUTO-RTO: 0 /100 WBCS
PLATELET # BLD AUTO: 230 THOUSANDS/UL (ref 149–390)
PMV BLD AUTO: 9.6 FL (ref 8.9–12.7)
POTASSIUM SERPL-SCNC: 4.1 MMOL/L (ref 3.5–5.3)
RBC # BLD AUTO: 4.33 MILLION/UL (ref 3.81–5.12)
SODIUM SERPL-SCNC: 140 MMOL/L (ref 136–145)
WBC # BLD AUTO: 6.44 THOUSAND/UL (ref 4.31–10.16)

## 2020-11-27 PROCEDURE — 83605 ASSAY OF LACTIC ACID: CPT | Performed by: EMERGENCY MEDICINE

## 2020-11-27 PROCEDURE — 85025 COMPLETE CBC W/AUTO DIFF WBC: CPT | Performed by: EMERGENCY MEDICINE

## 2020-11-27 PROCEDURE — 87040 BLOOD CULTURE FOR BACTERIA: CPT | Performed by: EMERGENCY MEDICINE

## 2020-11-27 PROCEDURE — 80048 BASIC METABOLIC PNL TOTAL CA: CPT | Performed by: EMERGENCY MEDICINE

## 2020-11-27 PROCEDURE — 93971 EXTREMITY STUDY: CPT

## 2020-11-27 PROCEDURE — 93971 EXTREMITY STUDY: CPT | Performed by: SURGERY

## 2020-11-27 PROCEDURE — 36415 COLL VENOUS BLD VENIPUNCTURE: CPT | Performed by: EMERGENCY MEDICINE

## 2020-11-27 PROCEDURE — 99284 EMERGENCY DEPT VISIT MOD MDM: CPT | Performed by: EMERGENCY MEDICINE

## 2020-11-27 PROCEDURE — 99284 EMERGENCY DEPT VISIT MOD MDM: CPT

## 2020-11-27 PROCEDURE — 96365 THER/PROPH/DIAG IV INF INIT: CPT

## 2020-11-27 RX ORDER — LOSARTAN POTASSIUM 25 MG/1
25 TABLET ORAL
COMMUNITY

## 2020-11-27 RX ORDER — CEPHALEXIN 500 MG/1
500 CAPSULE ORAL EVERY 8 HOURS SCHEDULED
Qty: 21 CAPSULE | Refills: 0 | Status: SHIPPED | OUTPATIENT
Start: 2020-11-27 | End: 2020-12-04

## 2020-11-27 RX ORDER — FUROSEMIDE 20 MG/1
20 TABLET ORAL DAILY
Status: ON HOLD | COMMUNITY
End: 2021-07-16 | Stop reason: SDUPTHER

## 2020-11-27 RX ORDER — CYCLOBENZAPRINE HCL 10 MG
10 TABLET ORAL
COMMUNITY

## 2020-11-27 RX ORDER — LISINOPRIL 10 MG/1
10 TABLET ORAL DAILY
COMMUNITY
End: 2021-07-16 | Stop reason: HOSPADM

## 2020-11-27 RX ORDER — CEFTRIAXONE 1 G/50ML
1000 INJECTION, SOLUTION INTRAVENOUS ONCE
Status: COMPLETED | OUTPATIENT
Start: 2020-11-27 | End: 2020-11-27

## 2020-11-27 RX ADMIN — CEFTRIAXONE 1000 MG: 1 INJECTION, SOLUTION INTRAVENOUS at 17:15

## 2020-12-02 LAB
BACTERIA BLD CULT: NORMAL
BACTERIA BLD CULT: NORMAL

## 2021-03-03 ENCOUNTER — IMMUNIZATIONS (OUTPATIENT)
Dept: FAMILY MEDICINE CLINIC | Facility: HOSPITAL | Age: 71
End: 2021-03-03

## 2021-03-03 DIAGNOSIS — Z23 ENCOUNTER FOR IMMUNIZATION: Primary | ICD-10-CM

## 2021-03-03 PROCEDURE — 91301 SARS-COV-2 / COVID-19 MRNA VACCINE (MODERNA) 100 MCG: CPT

## 2021-03-03 PROCEDURE — 0011A SARS-COV-2 / COVID-19 MRNA VACCINE (MODERNA) 100 MCG: CPT

## 2021-04-01 ENCOUNTER — IMMUNIZATIONS (OUTPATIENT)
Dept: FAMILY MEDICINE CLINIC | Facility: HOSPITAL | Age: 71
End: 2021-04-01

## 2021-04-01 DIAGNOSIS — Z23 ENCOUNTER FOR IMMUNIZATION: Primary | ICD-10-CM

## 2021-04-01 PROCEDURE — 91301 SARS-COV-2 / COVID-19 MRNA VACCINE (MODERNA) 100 MCG: CPT

## 2021-04-01 PROCEDURE — 0012A SARS-COV-2 / COVID-19 MRNA VACCINE (MODERNA) 100 MCG: CPT

## 2021-04-26 ENCOUNTER — TRANSCRIBE ORDERS (OUTPATIENT)
Dept: ADMINISTRATIVE | Facility: HOSPITAL | Age: 71
End: 2021-04-26

## 2021-04-26 DIAGNOSIS — Z12.31 ENCOUNTER FOR SCREENING MAMMOGRAM FOR MALIGNANT NEOPLASM OF BREAST: Primary | ICD-10-CM

## 2021-05-06 ENCOUNTER — HOSPITAL ENCOUNTER (OUTPATIENT)
Dept: RADIOLOGY | Facility: HOSPITAL | Age: 71
Discharge: HOME | End: 2021-05-06
Attending: INTERNAL MEDICINE
Payer: COMMERCIAL

## 2021-05-06 VITALS — HEIGHT: 67 IN | WEIGHT: 277 LBS | BODY MASS INDEX: 43.47 KG/M2

## 2021-05-06 DIAGNOSIS — Z12.31 ENCOUNTER FOR SCREENING MAMMOGRAM FOR MALIGNANT NEOPLASM OF BREAST: ICD-10-CM

## 2021-05-06 PROCEDURE — 77063 BREAST TOMOSYNTHESIS BI: CPT

## 2021-05-06 PROCEDURE — 77067 SCR MAMMO BI INCL CAD: CPT

## 2021-07-15 ENCOUNTER — APPOINTMENT (EMERGENCY)
Dept: RADIOLOGY | Facility: HOSPITAL | Age: 71
End: 2021-07-15
Payer: COMMERCIAL

## 2021-07-15 ENCOUNTER — HOSPITAL ENCOUNTER (OUTPATIENT)
Facility: HOSPITAL | Age: 71
Setting detail: OBSERVATION
Discharge: HOME | End: 2021-07-16
Attending: EMERGENCY MEDICINE | Admitting: INTERNAL MEDICINE
Payer: COMMERCIAL

## 2021-07-15 DIAGNOSIS — M54.50 CHRONIC BILATERAL LOW BACK PAIN, UNSPECIFIED WHETHER SCIATICA PRESENT: ICD-10-CM

## 2021-07-15 DIAGNOSIS — M48.061 SPINAL STENOSIS OF LUMBAR REGION, UNSPECIFIED WHETHER NEUROGENIC CLAUDICATION PRESENT: ICD-10-CM

## 2021-07-15 DIAGNOSIS — M79.89 LEG SWELLING: ICD-10-CM

## 2021-07-15 DIAGNOSIS — R07.9 CHEST PAIN, UNSPECIFIED TYPE: Primary | ICD-10-CM

## 2021-07-15 DIAGNOSIS — G89.29 CHRONIC BILATERAL LOW BACK PAIN, UNSPECIFIED WHETHER SCIATICA PRESENT: ICD-10-CM

## 2021-07-15 PROBLEM — R07.89 FEELING OF CHEST TIGHTNESS: Status: ACTIVE | Noted: 2021-07-15

## 2021-07-15 LAB
ALBUMIN SERPL BCP-MCNC: 3.3 G/DL (ref 3.5–5)
ALP SERPL-CCNC: 145 U/L (ref 46–116)
ALT SERPL W P-5'-P-CCNC: 35 U/L (ref 12–78)
ANION GAP SERPL CALCULATED.3IONS-SCNC: 8 MMOL/L (ref 4–13)
APTT PPP: 27 SECONDS (ref 23–37)
AST SERPL W P-5'-P-CCNC: 27 U/L (ref 5–45)
BASOPHILS # BLD AUTO: 0.05 THOUSANDS/ΜL (ref 0–0.1)
BASOPHILS NFR BLD AUTO: 1 % (ref 0–1)
BILIRUB SERPL-MCNC: 0.65 MG/DL (ref 0.2–1)
BUN SERPL-MCNC: 10 MG/DL (ref 5–25)
CALCIUM ALBUM COR SERPL-MCNC: 9 MG/DL (ref 8.3–10.1)
CALCIUM SERPL-MCNC: 8.4 MG/DL (ref 8.3–10.1)
CHLORIDE SERPL-SCNC: 103 MMOL/L (ref 100–108)
CO2 SERPL-SCNC: 28 MMOL/L (ref 21–32)
CREAT SERPL-MCNC: 0.86 MG/DL (ref 0.6–1.3)
EOSINOPHIL # BLD AUTO: 0.13 THOUSAND/ΜL (ref 0–0.61)
EOSINOPHIL NFR BLD AUTO: 2 % (ref 0–6)
ERYTHROCYTE [DISTWIDTH] IN BLOOD BY AUTOMATED COUNT: 14.7 % (ref 11.6–15.1)
GFR SERPL CREATININE-BSD FRML MDRD: 69 ML/MIN/1.73SQ M
GLUCOSE SERPL-MCNC: 214 MG/DL (ref 65–140)
HCT VFR BLD AUTO: 36.3 % (ref 34.8–46.1)
HGB BLD-MCNC: 11.3 G/DL (ref 11.5–15.4)
IMM GRANULOCYTES # BLD AUTO: 0.02 THOUSAND/UL (ref 0–0.2)
IMM GRANULOCYTES NFR BLD AUTO: 0 % (ref 0–2)
INR PPP: 1.01 (ref 0.84–1.19)
LACTATE SERPL-SCNC: 1.1 MMOL/L (ref 0.5–2)
LIPASE SERPL-CCNC: 151 U/L (ref 73–393)
LYMPHOCYTES # BLD AUTO: 0.74 THOUSANDS/ΜL (ref 0.6–4.47)
LYMPHOCYTES NFR BLD AUTO: 9 % (ref 14–44)
MCH RBC QN AUTO: 26.3 PG (ref 26.8–34.3)
MCHC RBC AUTO-ENTMCNC: 31.1 G/DL (ref 31.4–37.4)
MCV RBC AUTO: 84 FL (ref 82–98)
MONOCYTES # BLD AUTO: 0.8 THOUSAND/ΜL (ref 0.17–1.22)
MONOCYTES NFR BLD AUTO: 9 % (ref 4–12)
NEUTROPHILS # BLD AUTO: 6.83 THOUSANDS/ΜL (ref 1.85–7.62)
NEUTS SEG NFR BLD AUTO: 79 % (ref 43–75)
NRBC BLD AUTO-RTO: 0 /100 WBCS
NT-PROBNP SERPL-MCNC: 1364 PG/ML
PLATELET # BLD AUTO: 188 THOUSANDS/UL (ref 149–390)
PMV BLD AUTO: 9.6 FL (ref 8.9–12.7)
POTASSIUM SERPL-SCNC: 3.7 MMOL/L (ref 3.5–5.3)
PROT SERPL-MCNC: 6.7 G/DL (ref 6.4–8.2)
PROTHROMBIN TIME: 13.2 SECONDS (ref 11.6–14.5)
RBC # BLD AUTO: 4.3 MILLION/UL (ref 3.81–5.12)
SODIUM SERPL-SCNC: 139 MMOL/L (ref 136–145)
TROPONIN I SERPL-MCNC: 0.03 NG/ML
TROPONIN I SERPL-MCNC: 0.03 NG/ML
WBC # BLD AUTO: 8.57 THOUSAND/UL (ref 4.31–10.16)

## 2021-07-15 PROCEDURE — 83880 ASSAY OF NATRIURETIC PEPTIDE: CPT | Performed by: EMERGENCY MEDICINE

## 2021-07-15 PROCEDURE — 99285 EMERGENCY DEPT VISIT HI MDM: CPT | Performed by: EMERGENCY MEDICINE

## 2021-07-15 PROCEDURE — 85730 THROMBOPLASTIN TIME PARTIAL: CPT | Performed by: EMERGENCY MEDICINE

## 2021-07-15 PROCEDURE — 93005 ELECTROCARDIOGRAM TRACING: CPT

## 2021-07-15 PROCEDURE — 85610 PROTHROMBIN TIME: CPT | Performed by: EMERGENCY MEDICINE

## 2021-07-15 PROCEDURE — 84484 ASSAY OF TROPONIN QUANT: CPT | Performed by: EMERGENCY MEDICINE

## 2021-07-15 PROCEDURE — G1004 CDSM NDSC: HCPCS

## 2021-07-15 PROCEDURE — 80053 COMPREHEN METABOLIC PANEL: CPT | Performed by: EMERGENCY MEDICINE

## 2021-07-15 PROCEDURE — 83605 ASSAY OF LACTIC ACID: CPT | Performed by: EMERGENCY MEDICINE

## 2021-07-15 PROCEDURE — 87040 BLOOD CULTURE FOR BACTERIA: CPT | Performed by: EMERGENCY MEDICINE

## 2021-07-15 PROCEDURE — 36415 COLL VENOUS BLD VENIPUNCTURE: CPT | Performed by: EMERGENCY MEDICINE

## 2021-07-15 PROCEDURE — 71275 CT ANGIOGRAPHY CHEST: CPT

## 2021-07-15 PROCEDURE — 83690 ASSAY OF LIPASE: CPT | Performed by: EMERGENCY MEDICINE

## 2021-07-15 PROCEDURE — 99220 PR INITIAL OBSERVATION CARE/DAY 70 MINUTES: CPT | Performed by: INTERNAL MEDICINE

## 2021-07-15 PROCEDURE — 71045 X-RAY EXAM CHEST 1 VIEW: CPT

## 2021-07-15 PROCEDURE — 85025 COMPLETE CBC W/AUTO DIFF WBC: CPT | Performed by: EMERGENCY MEDICINE

## 2021-07-15 PROCEDURE — 87086 URINE CULTURE/COLONY COUNT: CPT | Performed by: EMERGENCY MEDICINE

## 2021-07-15 PROCEDURE — 99285 EMERGENCY DEPT VISIT HI MDM: CPT

## 2021-07-15 RX ORDER — KETOROLAC TROMETHAMINE 30 MG/ML
15 INJECTION, SOLUTION INTRAMUSCULAR; INTRAVENOUS EVERY 6 HOURS PRN
Status: DISCONTINUED | OUTPATIENT
Start: 2021-07-15 | End: 2021-07-16 | Stop reason: HOSPADM

## 2021-07-15 RX ORDER — LOSARTAN POTASSIUM 50 MG/1
50 TABLET ORAL
Status: DISCONTINUED | OUTPATIENT
Start: 2021-07-15 | End: 2021-07-16 | Stop reason: HOSPADM

## 2021-07-15 RX ORDER — METOPROLOL TARTRATE 100 MG/1
100 TABLET ORAL 2 TIMES DAILY
Status: DISCONTINUED | OUTPATIENT
Start: 2021-07-15 | End: 2021-07-16 | Stop reason: HOSPADM

## 2021-07-15 RX ORDER — FUROSEMIDE 20 MG/1
20 TABLET ORAL DAILY
Status: DISCONTINUED | OUTPATIENT
Start: 2021-07-16 | End: 2021-07-16 | Stop reason: HOSPADM

## 2021-07-15 RX ORDER — ONDANSETRON 2 MG/ML
4 INJECTION INTRAMUSCULAR; INTRAVENOUS EVERY 6 HOURS PRN
Status: DISCONTINUED | OUTPATIENT
Start: 2021-07-15 | End: 2021-07-16 | Stop reason: HOSPADM

## 2021-07-15 RX ORDER — AMLODIPINE BESYLATE 5 MG/1
5 TABLET ORAL DAILY
Status: DISCONTINUED | OUTPATIENT
Start: 2021-07-16 | End: 2021-07-16 | Stop reason: HOSPADM

## 2021-07-15 RX ORDER — SODIUM CHLORIDE 9 MG/ML
125 INJECTION, SOLUTION INTRAVENOUS CONTINUOUS
Status: DISCONTINUED | OUTPATIENT
Start: 2021-07-15 | End: 2021-07-16

## 2021-07-15 RX ORDER — CYCLOBENZAPRINE HCL 10 MG
10 TABLET ORAL
Status: DISCONTINUED | OUTPATIENT
Start: 2021-07-15 | End: 2021-07-16 | Stop reason: HOSPADM

## 2021-07-15 RX ORDER — CITALOPRAM 20 MG/1
20 TABLET ORAL DAILY
Status: DISCONTINUED | OUTPATIENT
Start: 2021-07-16 | End: 2021-07-16 | Stop reason: HOSPADM

## 2021-07-15 RX ORDER — PANTOPRAZOLE SODIUM 40 MG/1
40 TABLET, DELAYED RELEASE ORAL
Status: DISCONTINUED | OUTPATIENT
Start: 2021-07-16 | End: 2021-07-16 | Stop reason: HOSPADM

## 2021-07-15 RX ORDER — ACETAMINOPHEN 325 MG/1
975 TABLET ORAL EVERY 8 HOURS SCHEDULED
Status: DISCONTINUED | OUTPATIENT
Start: 2021-07-15 | End: 2021-07-16 | Stop reason: HOSPADM

## 2021-07-15 RX ADMIN — ACETAMINOPHEN 975 MG: 325 TABLET, FILM COATED ORAL at 21:47

## 2021-07-15 RX ADMIN — IOHEXOL 85 ML: 350 INJECTION, SOLUTION INTRAVENOUS at 14:40

## 2021-07-15 RX ADMIN — METOPROLOL TARTRATE 100 MG: 100 TABLET, FILM COATED ORAL at 21:01

## 2021-07-15 RX ADMIN — CYCLOBENZAPRINE HYDROCHLORIDE 10 MG: 10 TABLET, FILM COATED ORAL at 21:47

## 2021-07-15 RX ADMIN — LOSARTAN POTASSIUM 50 MG: 50 TABLET, FILM COATED ORAL at 21:47

## 2021-07-15 RX ADMIN — SODIUM CHLORIDE 125 ML/HR: 0.9 INJECTION, SOLUTION INTRAVENOUS at 12:37

## 2021-07-15 NOTE — H&P
History and Physical - [facility]    Patient Information: Patt Yates 73 y o  female MRN: [id number]  Unit/Bed#: ED 08 Encounter: [id number]  Admitting Physician: Etienne Vinson MD  PCP: Chelle Montiel MD  Date of Admission:  07/15/21        Hospital Problem List:     Principal Problem:    Feeling of chest tightness  Active Problems:    Essential hypertension    Hyperglycemia    Chronic bilateral low back pain    Morbid obesity ([facility] 75 )    History of gastric bypass      Assessment/Plan:    * Feeling of chest tightness  Assessment & Plan  Presented with complaints of chest tightness, generalized body aches/malaise subjective fever and mild cough since last Monday/Tuesday  Patient reports like having flu-like symptoms  Noted to have low-grade fever 100 1° F, other vital signs stable  CTA, EKG and labs unremarkable  Fully vaccinated for COVID   Denies sick contacts  · Observation  · SARS-CoV-2 PCR  · Supportive and symptomatic treatment  · Trend cardiac markers      Hyperglycemia  Assessment & Plan  Check hemoglobin A1c    Essential hypertension  Assessment & Plan  Stable  Continue home medication, amlodipine, metoprolol, losartan  Patient takes furosemide as needed for leg swelling  Patient is also on lisinopril in addition to losartan  Hold lisinopril    Morbid obesity ([facility] 75 )  Assessment & Plan  Patient endorses sedentary lifestyle, increase caloric intake and weight gain  Lifestyle modification  Check lipid profile, hemoglobin A1c    Chronic bilateral low back pain  Assessment & Plan  Reports limited mobility, denies any change in chronic back pain   Continue home medication  PT/OT          VTE Prophylaxis: Enoxaparin (Lovenox)  / sequential compression device   Code Status: Level 1 - Full Code    Anticipated Length of Stay:  Patient will be admitted on an Observation basis with an anticipated length of stay of  <2 midnights     Justification for Hospital Stay:  Evaluation of chest tightness  Total Time for Visit, including Counseling / Coordination of Care: 45 minutes  Greater than 50% of this total time spent on direct patient counseling and coordination of care  Chief Complaint:     Chest Pain ("it feels like there's a weight on may chest"  Started on Tuesday  Consistent discomfort ) and Generalized Body Aches (Since Monday night  "its like my legs are just on fire", pt states like sunburn  )    History of Present Illness:    Jamin Alvarado is a 73 y o  female with history of hypertension , GERD, chronic lower back pain, nephrolithiasis, depression who presents with chest tightness/heaviness  Patient reported that she started feeling generalized body ache malaise associated with chest tightness since last Monday/Tuesday  She also reported mild associated cough and subjective fever  Due to persistent symptoms she presented to ED for further evaluation  In ED, patient was noted to have low-grade fever 100 1° F other vital signs were stable  Patient had CTA of the chest which revealed no acute abnormality or pulmonary embolism  EKG without any acute abnormality  Cardiac markers were negative  Patient described chest tightness as diffuse without any specific exacerbating and relieving factor  Review of Systems:    Review of Systems   Constitutional: Positive for appetite change and fever  Negative for activity change and fatigue  Reports increased caloric intake, weight gain and sedentary lifestyle   HENT: Negative for congestion, sore throat and trouble swallowing  Eyes: Negative for visual disturbance  Respiratory: Positive for cough and chest tightness  Cardiovascular: Positive for leg swelling (Chronic left leg)  Gastrointestinal: Negative for abdominal pain, constipation, diarrhea and nausea  Genitourinary: Negative for difficulty urinating  Musculoskeletal: Positive for back pain (Chronic unchanged)     Neurological: Negative for dizziness, light-headedness and headaches  Psychiatric/Behavioral: Negative for behavioral problems  Past Medical and Surgical History:     Past Medical History:   Diagnosis Date    Chronic pain     GERD (gastroesophageal reflux disease)     Hypertension     Kidney stones        Past Surgical History:   Procedure Laterality Date    ABDOMINAL SURGERY      CHOLECYSTECTOMY      EGD AND COLONOSCOPY N/A 7/11/2016    Procedure: EGD AND COLONOSCOPY;  Surgeon: Fili Marcus MD;  Location: [place] [address] GI LAB; Service:     EPIDURAL BLOCK INJECTION Right 7/13/2017    Procedure: BLOCK / INJECTION EPIDURAL STEROID TRANSFORAMINAL  L4-5;  Surgeon: Sandoval Reeder MD;  Location: [place] [address] MAIN OR;  Service: Pain Management     EPIDURAL BLOCK INJECTION Right 8/9/2017    Procedure: BLOCK / INJECTION EPIDURAL STEROID TRANSFORAMINAL L4-5- REPEAT;  Surgeon: Sandoval Reeder MD;  Location: [place] [address] MAIN OR;  Service: Pain Management     EPIDURAL BLOCK INJECTION Left 2/16/2018    Procedure: LEFT L4 AND L5 TRANSFORAMINAL EPIDURAL STEROID INJECTION;  Surgeon: Sandoval Reeder MD;  Location: [place] [address] MAIN OR;  Service: Pain Management     GASTRIC BYPASS      MT CYSTO/URETERO W/LITHOTRIPSY &INDWELL STENT INSRT Left 7/13/2016    Procedure: CYSTOSCOPY URETEROSCOPY WITH LITHOTRIPSY HOLMIUM LASER, RETROGRADE PYELOGRAM AND INSERTION STENT URETERAL;  Surgeon: Du Degroot MD;  Location: [place] MAIN OR;  Service: Urology    TONSILLECTOMY         Meds/Allergies:    PTA meds:   Prior to Admission Medications   Prescriptions Last Dose Informant Patient Reported? Taking? AMLODIPINE BESYLATE PO 7/15/2021 at Unknown time  Yes Yes   Sig: Take 5 mg by mouth daily   citalopram (CeleXA) 20 mg tablet 7/15/2021 at Unknown time  Yes Yes   Sig: Take 20 mg by mouth daily Indications: Depression     cyclobenzaprine (FLEXERIL) 10 mg tablet   Yes No   Sig: Take 10 mg by mouth daily at bedtime   furosemide (LASIX) 20 mg tablet 7/14/2021 at a  Yes Yes   Sig: Take 20 mg by mouth daily   lisinopril (ZESTRIL) 10 mg tablet 7/15/2021 at Unknown time  Yes Yes   Sig: Take 10 mg by mouth daily   losartan (COZAAR) 25 mg tablet 7/14/2021 at Unknown time  Yes Yes   Sig: Take 25 mg by mouth daily at bedtime    metoprolol tartrate (LOPRESSOR) 100 mg tablet 7/15/2021 at Unknown time Self Yes Yes   Sig: Take 100 mg by mouth 2 (two) times a day Indications: High Blood Pressure  tamsulosin (FLOMAX) 0 4 mg   No No   Sig: Take 1 capsule (0 4 mg total) by mouth daily with dinner for 4 days      Facility-Administered Medications: None       Allergies: Allergies   Allergen Reactions    Vicodin [Hydrocodone-Acetaminophen] GI Intolerance     History:     Marital Status:      Substance Use History:   Social History     Substance and Sexual Activity   Alcohol Use No     Social History     Tobacco Use   Smoking Status Never Smoker   Smokeless Tobacco Never Used     Social History     Substance and Sexual Activity   Drug Use No       Family History:    Family History   Problem Relation Age of Onset    No Known Problems Daughter     No Known Problems Daughter     No Known Problems Maternal Aunt     No Known Problems Maternal Aunt     No Known Problems Paternal Aunt     No Known Problems Paternal Aunt     No Known Problems Paternal Aunt        Physical Exam:     Vitals:   Blood Pressure: 141/71 (07/15/21 1710)  Pulse: 63 (07/15/21 1710)  Temperature: 98 9 °F (37 2 °C) (07/15/21 1710)  Temp Source: Oral (07/15/21 1710)  Respirations: 18 (07/15/21 1710)  SpO2: 95 % (07/15/21 1710)    Physical Exam  Constitutional:       General: She is not in acute distress  Appearance: She is obese  HENT:      Head: Normocephalic and atraumatic  Eyes:      Conjunctiva/sclera: Conjunctivae normal       Pupils: Pupils are equal, round, and reactive to light  Cardiovascular:      Rate and Rhythm: Normal rate and regular rhythm  Heart sounds: Normal heart sounds     Pulmonary:      Effort: Pulmonary effort is normal  No respiratory distress  Breath sounds: Normal breath sounds  No wheezing or rales  Comments: Diminished, clear  Abdominal:      General: Bowel sounds are normal  There is no distension  Palpations: Abdomen is soft  Tenderness: There is no abdominal tenderness  There is no guarding or rebound  Musculoskeletal:      Comments: Trace lower extremity edema   Skin:     General: Skin is warm and dry  Findings: No rash  Neurological:      General: No focal deficit present  Mental Status: She is alert and oriented to person, place, and time  Mental status is at baseline  Cranial Nerves: No cranial nerve deficit  Lab Results: I have personally reviewed pertinent reports  Results from last 7 days   Lab Units 07/15/21  1133   WBC Thousand/uL 8 57   HEMOGLOBIN g/dL 11 3*   HEMATOCRIT % 36 3   PLATELETS Thousands/uL 188   NEUTROS PCT % 79*   LYMPHS PCT % 9*   MONOS PCT % 9   EOS PCT % 2     Results from last 7 days   Lab Units 07/15/21  1133   POTASSIUM mmol/L 3 7   CHLORIDE mmol/L 103   CO2 mmol/L 28   BUN mg/dL 10   CREATININE mg/dL 0 86   CALCIUM mg/dL 8 4   ALK PHOS U/L 145*   ALT U/L 35   AST U/L 27     Results from last 7 days   Lab Units 07/15/21  1133   INR  1 01       Imaging: I have personally reviewed pertinent reports  XR chest 1 view portable    Result Date: 7/15/2021  Narrative: CHEST INDICATION:   cp  COMPARISON: Chest CT from 7/15/2021  EXAM PERFORMED/VIEWS:  XR CHEST PORTABLE  FINDINGS: Cardiomediastinal silhouette normal  Lungs clear  No effusion or pneumothorax  Benign linear atelectasis in the left midlung  Osseous structures normal for age  Impression: No acute cardiopulmonary disease  Workstation performed: [id number]     CTA ED chest PE study    Result Date: 7/15/2021  Narrative: CTA - CHEST WITH IV CONTRAST - PULMONARY ANGIOGRAM INDICATION:   PE suspected, high pretest prob chest pain   COMPARISON: 7/8/2016 TECHNIQUE: CTA examination of the chest was performed using angiographic technique according to a protocol specifically tailored to evaluate for pulmonary embolism  Axial, sagittal, and coronal 2D reformatted images were created from the source data and  submitted for interpretation  In addition, coronal 3D MIP postprocessing was performed on the acquisition scanner  Radiation dose length product (DLP) for this visit:  687 82 mGy-cm   This examination, like all CT scans performed in the [facility], was performed utilizing techniques to minimize radiation dose exposure, including the use of iterative  reconstruction and automated exposure control  IV Contrast:  85 mL of iohexol (OMNIPAQUE)  FINDINGS: PULMONARY ARTERIAL TREE:  No pulmonary embolus is seen  LUNGS: The left posterior costophrenic angle was not completely included on the study  There is a 4 mm right lower lobe nodule on series 3 image 63, stable from 2016  Lungs are clear  There is no tracheal or endobronchial lesion  PLEURA:  Unremarkable  HEART/GREAT VESSELS:  Unremarkable for patient's age  MEDIASTINUM AND RADHA:  Unremarkable  CHEST WALL AND LOWER NECK:   Unremarkable  VISUALIZED STRUCTURES IN THE UPPER ABDOMEN:  There is a 4 9 x 6 4 cm hepatic cyst at the dome  There is a left lobe cyst measuring 3 9 x 5 4 cm  Additional smaller cysts are seen  OSSEOUS STRUCTURES:  No acute fracture or destructive osseous lesion  Impression: No acute pulmonary embolism  Workstation performed: [id number]       CTA ED chest PE study   Final Result      No acute pulmonary embolism  Workstation performed: [id number]         XR chest 1 view portable   Final Result      No acute cardiopulmonary disease                 Workstation performed: [id number]             EKG, Pathology, and Other Studies Reviewed on Admission:   · EKG-normal sinus rhythm 61 beats per minute without any acute abnormality or interval changes since 2016    [facility] Records Reviewed: Yes     ** Please Note: "This note has been constructed using a voice recognition system  Therefore there may be syntax, spelling, and/or grammatical errors   Please call if you have any questions  "**

## 2021-07-15 NOTE — ED PROVIDER NOTES
History  Chief Complaint   Patient presents with    Chest Pain     "it feels like there's a weight on may chest"  Started on Tuesday  Consistent discomfort   Generalized Body Aches     Since Monday night  "its like my legs are just on fire", pt states like sunburn  81-year-old female presents with complaints of having a heavy weight on her chest which started on Tuesday she states it has been intermittent but has occurred mostly through most of the day  She denies any shortness of breath she states she has had generalized body aches throughout her whole body along with this  No fevers chills nausea vomiting or diarrhea states she has not had this before  She cannot find anything to alleviate this discomfort and nothing she does seems to aggravate it      History provided by:  Patient   used: No        Prior to Admission Medications   Prescriptions Last Dose Informant Patient Reported? Taking? AMLODIPINE BESYLATE PO 7/15/2021 at Unknown time  Yes Yes   Sig: Take 5 mg by mouth daily   citalopram (CeleXA) 20 mg tablet 7/15/2021 at Unknown time  Yes Yes   Sig: Take 20 mg by mouth daily Indications: Depression  cyclobenzaprine (FLEXERIL) 10 mg tablet   Yes No   Sig: Take 10 mg by mouth daily at bedtime   furosemide (LASIX) 20 mg tablet 7/14/2021 at a  Yes Yes   Sig: Take 20 mg by mouth daily   lisinopril (ZESTRIL) 10 mg tablet 7/15/2021 at Unknown time  Yes Yes   Sig: Take 10 mg by mouth daily   losartan (COZAAR) 25 mg tablet 7/14/2021 at Unknown time  Yes Yes   Sig: Take 25 mg by mouth daily at bedtime    metoprolol tartrate (LOPRESSOR) 100 mg tablet 7/15/2021 at Unknown time Self Yes Yes   Sig: Take 100 mg by mouth 2 (two) times a day Indications: High Blood Pressure     tamsulosin (FLOMAX) 0 4 mg   No No   Sig: Take 1 capsule (0 4 mg total) by mouth daily with dinner for 4 days      Facility-Administered Medications: None       Past Medical History:   Diagnosis Date    Chronic pain  GERD (gastroesophageal reflux disease)     Hypertension     Kidney stones        Past Surgical History:   Procedure Laterality Date    ABDOMINAL SURGERY      CHOLECYSTECTOMY      EGD AND COLONOSCOPY N/A 7/11/2016    Procedure: EGD AND COLONOSCOPY;  Surgeon: Karina Burrell MD;  Location: [facility] GI LAB; Service:     EPIDURAL BLOCK INJECTION Right 7/13/2017    Procedure: BLOCK / INJECTION EPIDURAL STEROID TRANSFORAMINAL  L4-5;  Surgeon: Bhavin Archibald MD;  Location: [facility] MAIN OR;  Service: Pain Management     EPIDURAL BLOCK INJECTION Right 8/9/2017    Procedure: BLOCK / INJECTION EPIDURAL STEROID TRANSFORAMINAL L4-5- REPEAT;  Surgeon: Bhavin Archibald MD;  Location: [facility] MAIN OR;  Service: Pain Management     EPIDURAL BLOCK INJECTION Left 2/16/2018    Procedure: LEFT L4 AND L5 TRANSFORAMINAL EPIDURAL STEROID INJECTION;  Surgeon: Bhavin Archibald MD;  Location: [facility] MAIN OR;  Service: Pain Management     GASTRIC BYPASS      LA CYSTO/URETERO W/LITHOTRIPSY &INDWELL STENT INSRT Left 7/13/2016    Procedure: CYSTOSCOPY URETEROSCOPY WITH LITHOTRIPSY HOLMIUM LASER, RETROGRADE PYELOGRAM AND INSERTION STENT URETERAL;  Surgeon: Oral Kaminski MD;  Location: [place] MAIN OR;  Service: Urology    TONSILLECTOMY         Family History   Problem Relation Age of Onset    No Known Problems Daughter     No Known Problems Daughter     No Known Problems Maternal Aunt     No Known Problems Maternal Aunt     No Known Problems Paternal Aunt     No Known Problems Paternal Aunt     No Known Problems Paternal Aunt      I have reviewed and agree with the history as documented      E-Cigarette/Vaping    E-Cigarette Use Never User      E-Cigarette/Vaping Substances     Social History     Tobacco Use    Smoking status: Never Smoker    Smokeless tobacco: Never Used   Vaping Use    Vaping Use: Never used   Substance Use Topics    Alcohol use: No    Drug use: No       Review of Systems   Constitutional: Negative for activity change, chills, diaphoresis and fever  HENT: Negative for congestion, ear pain, nosebleeds, sore throat, trouble swallowing and voice change  Eyes: Negative for pain, discharge and redness  Respiratory: Negative for apnea, cough, choking, shortness of breath, wheezing and stridor  Cardiovascular: Positive for chest pain  Negative for palpitations  Gastrointestinal: Negative for abdominal distention, abdominal pain, constipation, diarrhea, nausea and vomiting  Endocrine: Negative for polydipsia  Genitourinary: Negative for difficulty urinating, dysuria, flank pain, frequency, hematuria and urgency  Musculoskeletal: Positive for myalgias  Negative for back pain, gait problem, joint swelling, neck pain and neck stiffness  Skin: Negative for pallor and rash  Neurological: Positive for weakness  Negative for dizziness, tremors, syncope, speech difficulty, numbness and headaches  Hematological: Negative for adenopathy  Psychiatric/Behavioral: Negative for confusion, hallucinations, self-injury and suicidal ideas  The patient is not nervous/anxious  Physical Exam  Physical Exam  Vitals and nursing note reviewed  Constitutional:       General: She is not in acute distress  Appearance: She is well-developed  She is not diaphoretic  HENT:      Head: Normocephalic and atraumatic  Right Ear: External ear normal       Left Ear: External ear normal       Nose: Nose normal    Eyes:      Conjunctiva/sclera: Conjunctivae normal       Pupils: Pupils are equal, round, and reactive to light  Cardiovascular:      Rate and Rhythm: Normal rate and regular rhythm  Heart sounds: Normal heart sounds  Pulmonary:      Effort: Pulmonary effort is normal       Breath sounds: Normal breath sounds  Abdominal:      General: Bowel sounds are normal       Palpations: Abdomen is soft  Musculoskeletal:         General: Normal range of motion        Cervical back: Normal range of motion and neck supple  Skin:     General: Skin is warm and dry  Neurological:      Mental Status: She is alert and oriented to person, place, and time  Deep Tendon Reflexes: Reflexes are normal and symmetric  Vital Signs  ED Triage Vitals [07/15/21 1107]   Temperature Pulse Respirations Blood Pressure SpO2   97 7 °F (36 5 °C) 70 20 162/76 97 %      Temp Source Heart Rate Source Patient Position - Orthostatic VS BP Location FiO2 (%)   Oral Monitor Lying Right arm --      Pain Score       --           Vitals:    07/15/21 1107 07/15/21 1230   BP: 162/76    Pulse: 70 58   Patient Position - Orthostatic VS: Lying          Visual Acuity      ED Medications  Medications   sodium chloride 0 9 % infusion (125 mL/hr Intravenous New Bag 7/15/21 1237)   iohexol (OMNIPAQUE) 350 MG/ML injection (SINGLE-DOSE) 85 mL (85 mL Intravenous Given 7/15/21 1440)       Diagnostic Studies  Results Reviewed     Procedure Component Value Units Date/Time    Troponin I repeat in 3hrs [[id number]]  (Normal) Collected: 07/15/21 1452    Lab Status: Final result Specimen: Blood from Arm, Left Updated: 07/15/21 1518     Troponin I 0 03 ng/mL     Blood culture #1 [[id number]] Collected: 07/15/21 1452    Lab Status: In process Specimen: Blood from Hand, Left Updated: 07/15/21 1455    Lactic acid [[id number]]  (Normal) Collected: 07/15/21 1306    Lab Status: Final result Specimen: Blood from Arm, Left Updated: 07/15/21 1336     LACTIC ACID 1 1 mmol/L     Narrative:      Result may be elevated if tourniquet was used during collection  UA (URINE) with reflex to Scope [[id number]]     Lab Status: No result Specimen: Urine     Blood culture #2 [[id number]] Collected: 07/15/21 1306    Lab Status:  In process Specimen: Blood from Arm, Left Updated: 07/15/21 1309    Urine culture [[id number]]     Lab Status: No result Specimen: Urine, Clean Catch     Lipase [[id number]]  (Normal) Collected: 07/15/21 1133    Lab Status: Final result Specimen: Blood from Arm, Left Updated: 07/15/21 1202     Lipase 151 u/L     NT-BNP PRO [[id number]]  (Abnormal) Collected: 07/15/21 1133    Lab Status: Final result Specimen: Blood from Arm, Left Updated: 07/15/21 1202     NT-proBNP 1,364 pg/mL     Troponin I [[id number]]  (Normal) Collected: 07/15/21 1133    Lab Status: Final result Specimen: Blood from Arm, Left Updated: 07/15/21 1201     Troponin I 0 03 ng/mL     Comprehensive metabolic panel [[id number]]  (Abnormal) Collected: 07/15/21 1133    Lab Status: Final result Specimen: Blood from Arm, Left Updated: 07/15/21 1156     Sodium 139 mmol/L      Potassium 3 7 mmol/L      Chloride 103 mmol/L      CO2 28 mmol/L      ANION GAP 8 mmol/L      BUN 10 mg/dL      Creatinine 0 86 mg/dL      Glucose 214 mg/dL      Calcium 8 4 mg/dL      Corrected Calcium 9 0 mg/dL      AST 27 U/L      ALT 35 U/L      Alkaline Phosphatase 145 U/L      Total Protein 6 7 g/dL      Albumin 3 3 g/dL      Total Bilirubin 0 65 mg/dL      eGFR 69 ml/min/1 73sq m     Narrative:      [place] guidelines for Chronic Kidney Disease (CKD):     Stage 1 with normal or high GFR (GFR > 90 mL/min/1 73 square meters)    Stage 2 Mild CKD (GFR = 60-89 mL/min/1 73 square meters)    Stage 3A Moderate CKD (GFR = 45-59 mL/min/1 73 square meters)    Stage 3B Moderate CKD (GFR = 30-44 mL/min/1 73 square meters)    Stage 4 Severe CKD (GFR = 15-29 mL/min/1 73 square meters)    Stage 5 End Stage CKD (GFR <15 mL/min/1 73 square meters)  Note: GFR calculation is accurate only with a steady state creatinine    Protime-INR [[id number]]  (Normal) Collected: 07/15/21 1133    Lab Status: Final result Specimen: Blood from Arm, Left Updated: 07/15/21 1150     Protime 13 2 seconds      INR 1 01    APTT [[id number]]  (Normal) Collected: 07/15/21 1133    Lab Status: Final result Specimen: Blood from Arm, Left Updated: 07/15/21 1150     PTT 27 seconds     CBC and differential [[id number]]  (Abnormal) Collected: 07/15/21 1133    Lab Status: Final result Specimen: Blood from Arm, Left Updated: 07/15/21 1138     WBC 8 57 Thousand/uL      RBC 4 30 Million/uL      Hemoglobin 11 3 g/dL      Hematocrit 36 3 %      MCV 84 fL      MCH 26 3 pg      MCHC 31 1 g/dL      RDW 14 7 %      MPV 9 6 fL      Platelets 281 Thousands/uL      nRBC 0 /100 WBCs      Neutrophils Relative 79 %      Immat GRANS % 0 %      Lymphocytes Relative 9 %      Monocytes Relative 9 %      Eosinophils Relative 2 %      Basophils Relative 1 %      Neutrophils Absolute 6 83 Thousands/µL      Immature Grans Absolute 0 02 Thousand/uL      Lymphocytes Absolute 0 74 Thousands/µL      Monocytes Absolute 0 80 Thousand/µL      Eosinophils Absolute 0 13 Thousand/µL      Basophils Absolute 0 05 Thousands/µL                  CTA ED chest PE study   Final Result by Karely Holcomb MD (07/15 1543)      No acute pulmonary embolism  Workstation performed: [id number]         XR chest 1 view portable    (Results Pending)              Procedures  Procedures         ED Course                                           MDM    Disposition  Final diagnoses:   Chest pain, unspecified type     Time reflects when diagnosis was documented in both MDM as applicable and the Disposition within this note     Time User Action Codes Description Comment    7/15/2021  4:13 PM Natali Montes Add [R07 9] Chest pain, unspecified type       ED Disposition     ED Disposition Condition Date/Time Comment    Admit Stable Thu Jul 15, 2021  4:13 PM Case was discussed with Dr Wendi Parks and the patient's admission status was agreed to be Admission Status: observation status to the service of Dr Wendi Parks   Follow-up Information    None         Patient's Medications   Discharge Prescriptions    No medications on file     No discharge procedures on file      PDMP Review     None          ED Provider  Electronically Signed by           Penny Gross DO  07/15/21 6554

## 2021-07-16 VITALS
OXYGEN SATURATION: 95 % | WEIGHT: 278.88 LBS | BODY MASS INDEX: 46.46 KG/M2 | DIASTOLIC BLOOD PRESSURE: 66 MMHG | HEIGHT: 65 IN | SYSTOLIC BLOOD PRESSURE: 127 MMHG | HEART RATE: 61 BPM | RESPIRATION RATE: 20 BRPM | TEMPERATURE: 97.7 F

## 2021-07-16 PROBLEM — Z98.84 HISTORY OF GASTRIC BYPASS: Status: ACTIVE | Noted: 2021-07-16

## 2021-07-16 PROBLEM — R07.89 FEELING OF CHEST TIGHTNESS: Status: RESOLVED | Noted: 2021-07-15 | Resolved: 2021-07-16

## 2021-07-16 PROBLEM — I10 ESSENTIAL HYPERTENSION: Status: ACTIVE | Noted: 2021-07-16

## 2021-07-16 PROBLEM — E66.01 MORBID OBESITY (HCC): Status: ACTIVE | Noted: 2021-07-16

## 2021-07-16 PROBLEM — R73.9 HYPERGLYCEMIA: Status: ACTIVE | Noted: 2021-07-16

## 2021-07-16 LAB
ANION GAP SERPL CALCULATED.3IONS-SCNC: 8 MMOL/L (ref 4–13)
BASOPHILS # BLD AUTO: 0.04 THOUSANDS/ΜL (ref 0–0.1)
BASOPHILS NFR BLD AUTO: 1 % (ref 0–1)
BUN SERPL-MCNC: 15 MG/DL (ref 5–25)
CALCIUM SERPL-MCNC: 8.5 MG/DL (ref 8.3–10.1)
CHLORIDE SERPL-SCNC: 108 MMOL/L (ref 100–108)
CHOLEST SERPL-MCNC: 157 MG/DL (ref 50–200)
CO2 SERPL-SCNC: 27 MMOL/L (ref 21–32)
CREAT SERPL-MCNC: 0.65 MG/DL (ref 0.6–1.3)
EOSINOPHIL # BLD AUTO: 0.21 THOUSAND/ΜL (ref 0–0.61)
EOSINOPHIL NFR BLD AUTO: 4 % (ref 0–6)
ERYTHROCYTE [DISTWIDTH] IN BLOOD BY AUTOMATED COUNT: 15 % (ref 11.6–15.1)
EST. AVERAGE GLUCOSE BLD GHB EST-MCNC: 123 MG/DL
GFR SERPL CREATININE-BSD FRML MDRD: 91 ML/MIN/1.73SQ M
GLUCOSE P FAST SERPL-MCNC: 112 MG/DL (ref 65–99)
GLUCOSE SERPL-MCNC: 112 MG/DL (ref 65–140)
HBA1C MFR BLD: 5.9 %
HCT VFR BLD AUTO: 36.1 % (ref 34.8–46.1)
HDLC SERPL-MCNC: 39 MG/DL
HGB BLD-MCNC: 11 G/DL (ref 11.5–15.4)
IMM GRANULOCYTES # BLD AUTO: 0.01 THOUSAND/UL (ref 0–0.2)
IMM GRANULOCYTES NFR BLD AUTO: 0 % (ref 0–2)
LDLC SERPL CALC-MCNC: 99 MG/DL (ref 0–100)
LYMPHOCYTES # BLD AUTO: 1.39 THOUSANDS/ΜL (ref 0.6–4.47)
LYMPHOCYTES NFR BLD AUTO: 26 % (ref 14–44)
MCH RBC QN AUTO: 26.2 PG (ref 26.8–34.3)
MCHC RBC AUTO-ENTMCNC: 30.5 G/DL (ref 31.4–37.4)
MCV RBC AUTO: 86 FL (ref 82–98)
MONOCYTES # BLD AUTO: 0.67 THOUSAND/ΜL (ref 0.17–1.22)
MONOCYTES NFR BLD AUTO: 13 % (ref 4–12)
NEUTROPHILS # BLD AUTO: 3.05 THOUSANDS/ΜL (ref 1.85–7.62)
NEUTS SEG NFR BLD AUTO: 56 % (ref 43–75)
NRBC BLD AUTO-RTO: 0 /100 WBCS
PLATELET # BLD AUTO: 173 THOUSANDS/UL (ref 149–390)
PMV BLD AUTO: 9.4 FL (ref 8.9–12.7)
POTASSIUM SERPL-SCNC: 3.6 MMOL/L (ref 3.5–5.3)
RBC # BLD AUTO: 4.2 MILLION/UL (ref 3.81–5.12)
SARS-COV-2 RNA RESP QL NAA+PROBE: NEGATIVE
SODIUM SERPL-SCNC: 143 MMOL/L (ref 136–145)
TRIGL SERPL-MCNC: 96 MG/DL
TROPONIN I SERPL-MCNC: 0.02 NG/ML
WBC # BLD AUTO: 5.37 THOUSAND/UL (ref 4.31–10.16)

## 2021-07-16 PROCEDURE — 97167 OT EVAL HIGH COMPLEX 60 MIN: CPT

## 2021-07-16 PROCEDURE — 85025 COMPLETE CBC W/AUTO DIFF WBC: CPT | Performed by: INTERNAL MEDICINE

## 2021-07-16 PROCEDURE — 97535 SELF CARE MNGMENT TRAINING: CPT

## 2021-07-16 PROCEDURE — U0003 INFECTIOUS AGENT DETECTION BY NUCLEIC ACID (DNA OR RNA); SEVERE ACUTE RESPIRATORY SYNDROME CORONAVIRUS 2 (SARS-COV-2) (CORONAVIRUS DISEASE [COVID-19]), AMPLIFIED PROBE TECHNIQUE, MAKING USE OF HIGH THROUGHPUT TECHNOLOGIES AS DESCRIBED BY CMS-2020-01-R: HCPCS | Performed by: INTERNAL MEDICINE

## 2021-07-16 PROCEDURE — 97163 PT EVAL HIGH COMPLEX 45 MIN: CPT

## 2021-07-16 PROCEDURE — 83036 HEMOGLOBIN GLYCOSYLATED A1C: CPT | Performed by: INTERNAL MEDICINE

## 2021-07-16 PROCEDURE — 80061 LIPID PANEL: CPT | Performed by: INTERNAL MEDICINE

## 2021-07-16 PROCEDURE — 97110 THERAPEUTIC EXERCISES: CPT

## 2021-07-16 PROCEDURE — 84484 ASSAY OF TROPONIN QUANT: CPT | Performed by: INTERNAL MEDICINE

## 2021-07-16 PROCEDURE — U0005 INFEC AGEN DETEC AMPLI PROBE: HCPCS | Performed by: INTERNAL MEDICINE

## 2021-07-16 PROCEDURE — 99217 PR OBSERVATION CARE DISCHARGE MANAGEMENT: CPT | Performed by: INTERNAL MEDICINE

## 2021-07-16 PROCEDURE — 80048 BASIC METABOLIC PNL TOTAL CA: CPT | Performed by: INTERNAL MEDICINE

## 2021-07-16 RX ORDER — FUROSEMIDE 20 MG/1
20 TABLET ORAL AS NEEDED
Refills: 0
Start: 2021-07-16

## 2021-07-16 RX ADMIN — PANTOPRAZOLE SODIUM 40 MG: 40 TABLET, DELAYED RELEASE ORAL at 05:53

## 2021-07-16 RX ADMIN — ACETAMINOPHEN 975 MG: 325 TABLET, FILM COATED ORAL at 05:54

## 2021-07-16 RX ADMIN — CITALOPRAM HYDROBROMIDE 20 MG: 20 TABLET ORAL at 10:00

## 2021-07-16 RX ADMIN — SODIUM CHLORIDE 125 ML/HR: 0.9 INJECTION, SOLUTION INTRAVENOUS at 02:04

## 2021-07-16 RX ADMIN — METOPROLOL TARTRATE 100 MG: 100 TABLET, FILM COATED ORAL at 10:00

## 2021-07-16 RX ADMIN — FUROSEMIDE 20 MG: 20 TABLET ORAL at 10:00

## 2021-07-16 RX ADMIN — AMLODIPINE BESYLATE 5 MG: 5 TABLET ORAL at 10:00

## 2021-07-16 RX ADMIN — ENOXAPARIN SODIUM 40 MG: 40 INJECTION SUBCUTANEOUS at 10:00

## 2021-07-16 NOTE — DISCHARGE SUMMARY
Discharge Summary - [facility]    Patient Information: Jamin Alvarado 73 y o  female MRN: [id number]  Unit/Bed#: [address] [place] [address] Encounter: [id number]    Discharging Physician / Practitioner: Pricila Goncalves MD  PCP: Cihrag Brown MD  Admission Date: 7/15/2021  Discharge Date: 07/16/21    Reason for Admission: Chest Pain ("it feels like there's a weight on may chest"  Started on Tuesday  Consistent discomfort ) and Generalized Body Aches (Since Monday night  "its like my legs are just on fire", pt states like sunburn  )      Discharge Diagnoses:     Principal Problem (Resolved):    Feeling of chest tightness  Active Problems:    Essential hypertension    Hyperglycemia    Chronic bilateral low back pain    Morbid obesity (HCC)    History of gastric bypass        * Feeling of chest tightness-resolved as of 7/16/2021  Assessment & Plan  Presented with complaints of chest tightness, generalized body aches/malaise subjective fever and mild cough since last Monday/Tuesday  Patient reports like having flu-like symptoms  Noted to have low-grade fever 100 1° F, other vital signs stable  CTA, EKG and labs unremarkable  Fully vaccinated for COVID   Denies sick contacts  SARS-CoV-2 PCR negative  Remains afebrile, medically stable  Chest heaviness improved    Reports generalized malaise and weakness  Probably symptom related to viral illness  · Serial cardiac markers negative  · Telemetry without any arrhythmia  · Advised Supportive and symptomatic treatment  · follow-up with PCP in 1 week  · Will suggest outpatient echocardiogram and stress test after improvement in acute illness      Hyperglycemia  Assessment & Plan  Hemoglobin A1c 5 9  Discussed with patient regarding diagnosis of impaired glucose tolerance/prediabetes  Lifestyle modification advised  Follow-up with PCP    Essential hypertension  Assessment & Plan  Stable  Continue home medication, amlodipine, metoprolol, losartan  Patient takes furosemide as needed for leg swelling  Patient is also on lisinopril in addition to losartan  Discontinue lisinopril  Will advised follow-up with PCP for further blood pressure monitoring adjustment  Lifestyle modification discussed    Morbid obesity ([address] )  Assessment & Plan  Patient endorses sedentary lifestyle, increase caloric intake and weight gain  Lifestyle modification  LDL 99, hemoglobin A1c 5 9, discussed with patient    Chronic bilateral low back pain  Assessment & Plan  Reports limited mobility, denies any change in chronic back pain   Continue home medication  PT/OT-home PT OT on discharge      Consultations During Hospital Stay:  None    Procedures Performed:     · None    Significant Findings:     · Refer to hospital course and above listed diagnosis related plan for details    Imaging while in hospital:    XR chest 1 view portable    Result Date: 7/15/2021  Narrative: CHEST INDICATION:   cp  COMPARISON: Chest CT from 7/15/2021  EXAM PERFORMED/VIEWS:  XR CHEST PORTABLE  FINDINGS: Cardiomediastinal silhouette normal  Lungs clear  No effusion or pneumothorax  Benign linear atelectasis in the left midlung  Osseous structures normal for age  Impression: No acute cardiopulmonary disease  Workstation performed: [id number]     CTA ED chest PE study    Result Date: 7/15/2021  Narrative: CTA - CHEST WITH IV CONTRAST - PULMONARY ANGIOGRAM INDICATION:   PE suspected, high pretest prob chest pain  COMPARISON: 7/8/2016 TECHNIQUE: CTA examination of the chest was performed using angiographic technique according to a protocol specifically tailored to evaluate for pulmonary embolism  Axial, sagittal, and coronal 2D reformatted images were created from the source data and  submitted for interpretation  In addition, coronal 3D MIP postprocessing was performed on the acquisition scanner  Radiation dose length product (DLP) for this visit:  687 82 mGy-cm     This examination, like all CT scans performed in the [facility]  [address], was performed utilizing techniques to minimize radiation dose exposure, including the use of iterative  reconstruction and automated exposure control  IV Contrast:  85 mL of iohexol (OMNIPAQUE)  FINDINGS: PULMONARY ARTERIAL TREE:  No pulmonary embolus is seen  LUNGS: The left posterior costophrenic angle was not completely included on the study  There is a 4 mm right lower lobe nodule on series 3 image 63, stable from 2016  Lungs are clear  There is no tracheal or endobronchial lesion  PLEURA:  Unremarkable  HEART/GREAT VESSELS:  Unremarkable for patient's age  MEDIASTINUM AND RADHA:  Unremarkable  CHEST WALL AND LOWER NECK:   Unremarkable  VISUALIZED STRUCTURES IN THE UPPER ABDOMEN:  There is a 4 9 x 6 4 cm hepatic cyst at the dome  There is a left lobe cyst measuring 3 9 x 5 4 cm  Additional smaller cysts are seen  OSSEOUS STRUCTURES:  No acute fracture or destructive osseous lesion  Impression: No acute pulmonary embolism  Workstation performed: [id number]       Incidental Findings:   · Lung nodule and hepatic cyst  · Hemoglobin A1c 5 9    Test Results Pending at Discharge (will require follow up):   · As per After Visit Summary     Outpatient Tests Requested:  · Repeat imaging as per PCP for lung nodule hepatic cyst  · Echocardiogram, stress test    Complications:  Refer to hospital course and above listed diagnosis related plan, if any    Hospital Course: As per HPI  Ijeoma Lyle is a 73 y o  female patient with history of hypertension, GERD, chronic lower back pain, nephrolithiasis, depression who originally presented to the hospital on 7/15/2021 due to chest tightness/heaviness  Patient reported that she started feeling generalized body ache malaise associated with chest tightness since last Monday/Tuesday  She also reported mild associated cough and subjective fever  Due to persistent symptoms she presented to ED for further evaluation    In ED, patient was noted to have low-grade fever 100 1° F other vital signs were stable  Patient had CTA of the chest which revealed no acute abnormality or pulmonary embolism  EKG without any acute abnormality  Cardiac markers were negative  Patient described chest tightness as diffuse without any specific exacerbating and relieving factor  Please see above list of diagnoses and related plan for additional information  Condition at Discharge: stable     Discharge Day Visit / Exam:     Subjective:  Reports feeling generalized weakness  Chest heaviness has improved  Denies any shortness of breath or dizziness  Remained afebrile, hemodynamically stable  Telemetry no arrhythmia    Vitals: Blood Pressure: 127/66 (07/16/21 1102)  Pulse: 61 (07/16/21 1102)  Temperature: 97 7 °F (36 5 °C) (07/16/21 1102)  Temp Source: Oral (07/15/21 2019)  Respirations: 20 (07/16/21 1102)  Height: 5' 5" (165 1 cm) (07/15/21 2019)  Weight - Scale: 126 kg (278 lb 14 1 oz) (07/15/21 2019)  SpO2: 95 % (07/16/21 1102)  Exam:   Physical Exam  Constitutional:       General: She is not in acute distress  Appearance: She is obese  HENT:      Head: Normocephalic and atraumatic  Cardiovascular:      Rate and Rhythm: Normal rate  Pulmonary:      Effort: Pulmonary effort is normal  No respiratory distress  Breath sounds: Normal breath sounds  No wheezing or rales  Abdominal:      General: Bowel sounds are normal  There is no distension  Palpations: Abdomen is soft  Tenderness: There is no abdominal tenderness  There is no guarding or rebound  Musculoskeletal:      Right lower leg: No edema  Left lower leg: No edema  Skin:     General: Skin is warm and dry  Findings: No rash  Neurological:      General: No focal deficit present  Mental Status: She is alert and oriented to person, place, and time  Mental status is at baseline  Cranial Nerves: No cranial nerve deficit  Sensory: No sensory deficit  Psychiatric:         Mood and Affect: Mood normal          Discharge instructions/Information to patient and family:(Discharge Medications and Follow up):   See after visit summary for information provided to patient and family  Provisions for Follow-Up Care:  See after visit summary for information related to follow-up care and any pertinent home health orders  Disposition: Home with PT/OT    Planned Readmission:  No     Discharge Statement:  I spent 45 minutes discharging the patient  This time was spent on the day of discharge  I had direct contact with the patient on the day of discharge  Greater than 50% of the total time was spent examining patient, answering all patient questions, arranging and discussing plan of care with patient as well as directly providing post-discharge instructions  Additional time then spent on discharge activities  Coordinated with nurse  Case management the time of discharge     Discharge Medications:  See after visit summary for reconciled discharge medications provided to patient and family  ** Please Note: "This note has been constructed using a voice recognition system  Therefore there may be syntax, spelling, and/or grammatical errors   Please call if you have any questions  "**

## 2021-07-16 NOTE — INCIDENTAL FINDINGS
The following findings require follow up:  Radiographic finding   Finding:  CT scan of the chest incidentally noted   1  4 mm right lower lobe nodule on lung      2  Ирина Pry is a 4 9 x 6 4 cm hepatic cyst at the dome   There is a left lobe cyst measuring 3 9 x 5 4 cm   Additional smaller cysts are seen   Follow up required:  Yes   Follow up should be done within 6 month(s)    Please notify the following clinician to assist with the follow up:   Dr Viola Russo MD

## 2021-07-16 NOTE — CASE MANAGEMENT
DC date, AVS and SOC faxed to [employer] [facility] in [address] [place] [address] Rd

## 2021-07-16 NOTE — PHYSICAL THERAPY NOTE
PT EVALUATION     07/16/21 1055   Note Type   Note type Evaluation   Pain Assessment   Pain Assessment Tool 0-10   Pain Score 5  (LS area and 3/10 body aches)   Home Living   Type of Home House   Home Layout Two level; Able to live on main level with bedroom/bathroom   Home Equipment Cane   Additional Comments Patient recently starting to use cane due to LS pain and imbalance   Prior Function   Level of [place] Independent with ADLs and functional mobility   Lives With Significant other   Receives Help From Family   ADL Assistance Independent   IADLs Needs assistance   Comments Patient history chronic low pain worsening increasing gait dysfunction several weeks  Patient states fear walking outside of the home on on level surfaces due to possible fall  Restrictions/Precautions   Other Precautions Fall Risk;Pain   General   Additional Pertinent History Chart reviewed, patient admitted with chest pain and body aches  Patient now presents as generally weak deconditioned and tolerating gait well with roller walker  Patient will benefit from use of roller walker at home and home therapy services   Family/Caregiver Present No   Cognition   Overall Cognitive Status WFL   Arousal/Participation Cooperative   Attention Within functional limits   Orientation Level Oriented X4   Following Commands Follows all commands and directions without difficulty   RLE Assessment   RLE Assessment   (ROM WFL, strength 3+/4-)   LLE Assessment   LLE Assessment   (ROM WFL, strength 3+/ 4-)   Coordination   Movements are Fluid and Coordinated 0   Transfers   Sit to Stand 4  Minimal assistance   Additional items Assist x 1   Stand to Sit 5  Supervision   Ambulation/Elevation   Gait Assistance 4  Minimal assist   Additional items Verbal cues; Assist x 1; Tactile cues   Assistive Device Straight cane   Distance 20 ft with change in direction balance loss laterally at times requiring assist of therapist to correct    Shortened stride length and narrow base of support  Guarded gait as well due to LS pain   Balance   Static Sitting Fair +   Dynamic Sitting Fair   Static Standing Fair   Dynamic Standing Fair -   Ambulatory Fair -   Activity Tolerance   Activity Tolerance Patient limited by fatigue;Patient limited by pain   Nurse Made Aware yes   Assessment   Prognosis Good   Problem List Decreased strength;Decreased range of motion;Decreased endurance; Impaired balance;Decreased mobility; Decreased coordination;Pain   Assessment Patient seen for Physical Therapy evaluation  Patient admitted with Feeling of chest tightness  Comorbidities affecting patient's physical performance include: arthritis, chronic pain, CVA and hypertension, gait dysfunction, LS pain  Personal factors affecting patient at time of initial evaluation include: ambulating with assistive device, stairs to enter home, inability to ambulate household distances, inability to navigate community distances, inability to navigate level surfaces without external assistance, inability to perform dynamic tasks in community, limited home support, inability to perform physical activity, inability to perform ADLS and inability to perform IADLS   Prior to admission, patient was independent with functional mobility without assistive device, independent with ADLS, independent with IADLS, living in a multi-level home and home with family assist   Please find objective findings from Physical Therapy assessment regarding body systems outlined above with impairments and limitations including impaired balance, decreased endurance, impaired coordination, gait deviations, pain, decreased activity tolerance, decreased functional mobility tolerance and fall risk  The Barthel Index was used as a functional outcome tool presenting with a score of 55 today indicating marked limitations of functional mobility and ADLS    Patient's clinical presentation is currently unstable/unpredictable as seen in patient's presentation of vital sign response, changing level of pain, increased fall risk, new onset of impairment of functional mobility, decreased endurance and new onset of weakness  Pt would benefit from continued Physical Therapy treatment to address deficits as defined above and maximize level of functional mobility  As demonstrated by objective findings, the assigned level of complexity for this evaluation is high  The patient's AM-PAC Basic Mobility Inpatient Short Form Raw Score is 18, Standardized Score is 41 05  A standardized score less than 42 9 suggests the patient may benefit from discharge to post-acute rehabilitation services although patient can return home with home PT services in use of roller walker  Please also refer to the recommendation of the Physical Therapist for safe discharge planning  Goals   Patient Goals To not fall and decreased pain   STG Expiration Date 07/23/21   Short Term Goal #1 Transfers and gait with roller walker independently   Short Term Goal #2 Gait endurance to functional household distances, strength bilateral lower extremities 4+/5 all to meet patient goal fall prevention and decreased LS pain   LTG Expiration Date 07/30/21   Long Term Goal #1 Independent gait with cane   Long Term Goal #2 Gait endurance to functional community distances   Plan   Treatment/Interventions ADL retraining;Functional transfer training;LE strengthening/ROM; Elevations; Therapeutic exercise; Endurance training;Patient/family training;Equipment eval/education;Gait training; Compensatory technique education   PT Frequency 5x/wk   Recommendation   PT Discharge Recommendation Home with home health rehabilitation   [address] [place] [address] Mobility Inpatient   Turning in Bed Without Bedrails 4   Lying on Back to Sitting on Edge of Flat Bed 3   Moving Bed to Chair 3   Standing Up From Chair 3   Walk in Room 3   Climb 3-5 Stairs 2   Basic Mobility Inpatient Raw Score 18   Basic Mobility Standardized Score 41 05 Barthel Index   Feeding 10   Bathing 0   Grooming Score 0   Dressing Score 5   Bladder Score 10   Bowels Score 10   Toilet Use Score 5   Transfers (Bed/Chair) Score 10   Mobility (Level Surface) Score 0   Stairs Score 5   Barthel Index Score 54   Licensure   [place] License Number  Anne Galan PT [id number]     PT TREATMENT  Time In:1040  Time Out:1055  Total Time: 15min      S:  Patient reports 5/10 LS pain  O:  -gait training with roller walker 30 ft with change in direction with improved balance noted requiring supervision only  -education completed in benefits of use of roller walker for home  -bilateral lower extremity exercise completed sitting on bed edge:  Long arc quads, ankle pumps, hip flexion all 5 reps each  A:  Patient tolerated use of roller walker well and agreeable to use at patient will benefit from continued physical therapy with home PT services and progression as tolerated  P:  [address]tega, PT

## 2021-07-16 NOTE — ASSESSMENT & PLAN NOTE
Hemoglobin A1c 5 9  Discussed with patient regarding diagnosis of impaired glucose tolerance/prediabetes  Lifestyle modification advised  Follow-up with PCP

## 2021-07-16 NOTE — ASSESSMENT & PLAN NOTE
Patient endorses sedentary lifestyle, increase caloric intake and weight gain  Lifestyle modification  LDL 99, hemoglobin A1c 5 9, discussed with patient

## 2021-07-16 NOTE — CASE MANAGEMENT
CMspoke with Dr Delio Adames and PT  Patient is for dc today and it is recommended for patient to return home w/HHS and a RW  Cm called patient and discussed recommendations  She states she does not want a walker  She states she is interested in having home PT  She was provided with agency choice and she states she has no preference  CM sent a referral to [address] [place] [address] in [place]  Patient will have a ride home with family

## 2021-07-16 NOTE — ASSESSMENT & PLAN NOTE
Presented with complaints of chest tightness, generalized body aches/malaise subjective fever and mild cough since last Monday/Tuesday  Patient reports like having flu-like symptoms  Noted to have low-grade fever 100 1° F, other vital signs stable  CTA, EKG and labs unremarkable  Fully vaccinated for COVID   Denies sick contacts  SARS-CoV-2 PCR negative  Remains afebrile, medically stable  Chest heaviness improved    Reports generalized malaise and weakness  Probably symptom related to viral illness  · Serial cardiac markers negative  · Telemetry without any arrhythmia  · Advised Supportive and symptomatic treatment  · follow-up with PCP in 1 week  · Will suggest outpatient echocardiogram and stress test after improvement in acute illness

## 2021-07-16 NOTE — ASSESSMENT & PLAN NOTE
Reports limited mobility, denies any change in chronic back pain   Continue home medication  PT/OT-home PT OT on discharge

## 2021-07-16 NOTE — OCCUPATIONAL THERAPY NOTE
Occupational Therapy Evaluation/Treatment       07/16/21 0945   Note Type   Note type Evaluation   Restrictions/Precautions   Other Precautions Fall Risk;Pain   Pain Assessment   Pain Assessment Tool 0-10   Pain Score 4   Pain Location/Orientation Location: Chest   Home Living   Type of [address] [place] [address] Two level; Able to live on main level with bedroom/bathroom  (shower is on 2nd floor, only goes up occassionally )   Bathroom Shower/Tub Tub/shower unit   [address] [facility]  (pt reports just started using a cane when outside )   Prior Function   Level of [place] Independent with ADLs and functional mobility; Needs assistance with IADLs   Lives With Other (Comment)  (ex- )   Receives Help From   (ex- )   ADL Assistance Independent   IADLs Needs assistance   ADL   Eating Assistance 7  Independent   Grooming Assistance 5  Supervision/Setup   UB Bathing Assistance 5  Supervision/Setup   LB Bathing Assistance 4  Minimal Assistance   UB Dressing Assistance 5  Supervision/Setup   LB Dressing Assistance 4  [address]  4  Minimal Assistance   Bed Mobility   Supine to Sit 4  Minimal assistance   Additional items Assist x 1;Verbal cues   Sit to Supine 5  Supervision   Transfers   Sit to Stand 4  Minimal assistance   Additional items Assist x 1;Verbal cues   Stand to Sit 4  Minimal assistance   Additional items Assist x 1;Verbal cues   Functional Mobility   Functional Mobility 4  Minimal assistance   Additional Comments 20 feet   Additional items   (use of IV pole )   Balance   Static Sitting Fair +   Dynamic Sitting Fair   Static Standing Fair   Dynamic Standing Fair -   Activity Tolerance   Activity Tolerance Patient limited by fatigue  (pr reports body aches)   Nurse Made Aware yes   RUE Assessment   RUE Assessment WFL   LUE Assessment   LUE Assessment WFL   Cognition   Overall Cognitive Status WFL   Arousal/Participation Cooperative   Attention Within functional limits   Orientation Level Oriented X4   Following Commands Follows all commands and directions without difficulty   Assessment   Limitation Decreased ADL status; Decreased UE strength;Decreased Safe judgement during ADL;Decreased endurance;Decreased self-care trans;Decreased high-level ADLs  (decreased balance and mobility )   Prognosis Good   Assessment Patient evaluated by Occupational Therapy  Patient admitted with Feeling of chest tightness  The patients occupational profile, medical and therapy history includes a extensive additional review of physical, cognitive, or psychosocial history related to current functional performance  Comorbidities affecting functional mobility and ADLS include: arthritis, chronic pain, CVA and hypertension  Prior to admission, patient was independent with functional mobility with cane when outside, independent with ADLS and requiring assist for IADLS  The evaluation identifies the following performance deficits: weakness, impaired balance, decreased endurance, increased fall risk, new onset of impairment of functional mobility, decreased ADLS, decreased IADLS, pain, decreased activity tolerance, decreased safety awareness, impaired judgement and decreased strength, that result in activity limitations and/or participation restrictions  This evaluation requires clinical decision making of high complexity, because the patient presents with comorbidites that affect occupational performance and required significant modification of tasks or assistance with consideration of multiple treatment options  The Barthel Index was used as a functional outcome tool presenting with a score of 55, indicating marked limitations of functional mobility and ADLS  The patient's raw score on the AM-PAC Daily Activity inpatient short form is 21, standardized score is 44 27, greater than 39 4  Patients at this level are likely to benefit from DC to home   Please refer to the recommendation of the Occupational Therapist for safe DC planning  Patient will benefit from skilled Occupational Therapy services to address above deficits and facilitate a safe return to prior level of function  Goals   Patient Goals to go home and feel better    STG Time Frame   (1-7 days)   Short Term Goal  Goals established to promote patient goal of going home and feeling better:  Patient will increase standing tolerance to 3 minutes during ADL task to decrease assistance level and decrease fall risk; Patient will increase bed mobility to supervision in preparation for ADLS and transfers; Patient will increase functional mobility to and from bathroom with single point cane with supervision to increase performance with ADLS and to use a toilet; Patient will tolerate 10 minutes of UE ROM/strengthening to increase general activity tolerance and performance in ADLS/IADLS; Patient will improve functional activity tolerance to 10 minutes of sustained functional tasks to increase participation in basic self-care and decrease assistance level;  Patient will be able to to verbalize understanding and perform energy conservation/proper body mechanics during ADLS and functional mobility at least 75% of the time with minimal cueing to decrease signs of fatigue and increase stamina to return to prior level of function; Patient will increase dynamic sitting balance to fair+ to improve the ability to sit at edge of bed or on a chair for ADLS;  Patient will increase dynamic standing balance to fair to improve postural stability and decrease fall risk during standing ADLS and transfers  LTG Time Frame   (8-14 days)   Long Term Goal Patient will increase standing tolerance to 6 minutes during ADL task to decrease assistance level and decrease fall risk; Patient will increase bed mobility to independent in preparation for ADLS and transfers;  Patient will increase functional mobility to and from bathroom with single point cane independently to increase performance with ADLS and to use a toilet; Patient will tolerate 20 minutes of UE ROM/strengthening to increase general activity tolerance and performance in ADLS/IADLS; Patient will improve functional activity tolerance to 20 minutes of sustained functional tasks to increase participation in basic self-care and decrease assistance level;  Patient will be able to to verbalize understanding and perform energy conservation/proper body mechanics during ADLS and functional mobility at least 90% of the time without cueing to decrease signs of fatigue and increase stamina to return to prior level of function; Patient will increase dynamic sitting balance to good to improve the ability to sit at edge of bed or on a chair for ADLS;  Patient will increase dynamic standing balance to fair+ to improve postural stability and decrease fall risk during standing ADLS and transfers  Pt will score >/= 24/24 on AM-PAC Daily Activity Inpatient scale to promote safe independence with ADLs and functional mobility; Pt will score >/= 85/100 on Barthel Index in order to decrease caregiver assistance needed and increase ability to perform ADLs and functional mobility  Functional Transfer Goals   Pt Will Perform All Functional Transfers   (STG supervision LTG independent )   ADL Goals   Pt Will Perform Grooming Standing at sink  (STG supervision LTG independent )   Pt Will Perform Bathing   (STG supervision LTG independent )   Pt Will Perform LE Dressing   (STG supervision LTG independent )   Pt Will Perform Toileting   (STG supervision LTG independent )   Plan   Treatment Interventions ADL retraining;Functional transfer training;UE strengthening/ROM; Endurance training;Patient/family training;Equipment evaluation/education; Activityengagement; Compensatory technique education   Goal Expiration Date 07/30/21   OT Frequency 3-5x/wk   Additional Treatment Session   Start Time 0930   End Time 0945   Treatment Assessment S: 4/10 chest O: toilet transfer min assist, hygiene supervision seated, clothing management min assist   Patient stood to wash hands at sink with supervision  Functional mobility 15 feet with cane min assist/supervision  Stand to sit supervision on bed  SIt to supine supervision  A:  Patient limited by fatigue  Patient is cooperative and pleasant  Patient will benefit from continued OT services to maximize functional performance with ADLS    P: home OT   Recommendation   OT Discharge Recommendation Home with home health rehabilitation   AM-PAC Daily Activity Inpatient   Lower Body Dressing 3   Bathing 3   Toileting 3   Upper Body Dressing 4   Grooming 4   Eating 4   Daily Activity Raw Score 21   Daily Activity Standardized Score (Calc for Raw Score >=11) 44 27   AM-PAC Applied Cognition Inpatient   Following a Speech/Presentation 4   Understanding Ordinary Conversation 4   Taking Medications 4   Remembering Where Things Are Placed or Put Away 4   Remembering List of 4-5 Errands 4   Taking Care of Complicated Tasks 4   Applied Cognition Raw Score 24   Applied Cognition Standardized Score 62 21   Barthel Index   Feeding 10   Bathing 0   Grooming Score 0   Dressing Score 5   Bladder Score 10   Bowels Score 10   Toilet Use Score 5   Transfers (Bed/Chair) Score 10   Mobility (Level Surface) Score 0   Stairs Score 5   Barthel Index Score 54   Licensure   [place] License Number  Geovanny Henderson [facility] [address] OTR/L [id number]

## 2021-07-16 NOTE — PLAN OF CARE
Problem: Potential for Falls  Goal: Patient will remain free of falls  Description: INTERVENTIONS:  - Educate patient/family on patient safety including physical limitations  - Instruct patient to call for assistance with activity   - Consult OT/PT to assist with strengthening/mobility   - Keep Call bell within reach  - Keep bed low and locked with side rails adjusted as appropriate  - Keep care items and personal belongings within reach  - Initiate and maintain comfort rounds  - Make Fall Risk Sign visible to staff  - Offer Toileting every 2 Hours, in advance of need  - Initiate/Maintain bed alarm  - Obtain necessary fall risk management equipment: bed alarm  - Apply yellow socks and bracelet for high fall risk patients  - Consider moving patient to room near nurses station  Outcome: Progressing     Problem: PAIN - ADULT  Goal: Verbalizes/displays adequate comfort level or baseline comfort level  Description: Interventions:  - Encourage patient to monitor pain and request assistance  - Assess pain using appropriate pain scale  - Administer analgesics based on type and severity of pain and evaluate response  - Implement non-pharmacological measures as appropriate and evaluate response  - Consider cultural and social influences on pain and pain management  - Notify physician/advanced practitioner if interventions unsuccessful or patient reports new pain  Outcome: Progressing     Problem: INFECTION - ADULT  Goal: Absence or prevention of progression during hospitalization  Description: INTERVENTIONS:  - Assess and monitor for signs and symptoms of infection  - Monitor lab/diagnostic results  - Monitor all insertion sites, i e  indwelling lines, tubes, and drains  - Monitor endotracheal if appropriate and nasal secretions for changes in amount and color  - Hope appropriate cooling/warming therapies per order  - Administer medications as ordered  - Instruct and encourage patient and family to use good hand hygiene technique  - Identify and instruct in appropriate isolation precautions for identified infection/condition  Outcome: Progressing     Problem: SAFETY ADULT  Goal: Patient will remain free of falls  Description: INTERVENTIONS:  - Educate patient/family on patient safety including physical limitations  - Instruct patient to call for assistance with activity   - Consult OT/PT to assist with strengthening/mobility   - Keep Call bell within reach  - Keep bed low and locked with side rails adjusted as appropriate  - Keep care items and personal belongings within reach  - Initiate and maintain comfort rounds  - Make Fall Risk Sign visible to staff  - Offer Toileting every 2  Hours, in advance of need  - Initiate/Maintain bed alarm  - Obtain necessary fall risk management equipment: bed alarm  - Apply yellow socks and bracelet for high fall risk patients  - Consider moving patient to room near nurses station  Outcome: Progressing  Goal: Maintain or return to baseline ADL function  Description: INTERVENTIONS:  -  Assess patient's ability to carry out ADLs; assess patient's baseline for ADL function and identify physical deficits which impact ability to perform ADLs (bathing, care of mouth/teeth, toileting, grooming, dressing, etc )  - Assess/evaluate cause of self-care deficits   - Assess range of motion  - Assess patient's mobility; develop plan if impaired  - Assess patient's need for assistive devices and provide as appropriate  - Encourage maximum independence but intervene and supervise when necessary  - Involve family in performance of ADLs  - Assess for home care needs following discharge   - Consider OT consult to assist with ADL evaluation and planning for discharge  - Provide patient education as appropriate  Outcome: Progressing  Goal: Maintains/Returns to pre admission functional level  Description: INTERVENTIONS:  - Perform BMAT or MOVE assessment daily    - Set and communicate daily mobility goal to care team and patient/family/caregiver  - Collaborate with rehabilitation services on mobility goals if consulted  - Perform Range of Motion 4  times a day  - Reposition patient every 2  hours  - Dangle patient 4 times a day  - Stand patient 4 times a day  - Ambulate patient 4  times a day  - Out of bed to chair 4  times a day   - Out of bed for meals 3 times a day  - Out of bed for toileting  - Record patient progress and toleration of activity level   Outcome: Progressing     Problem: DISCHARGE PLANNING  Goal: Discharge to home or other facility with appropriate resources  Description: INTERVENTIONS:  - Identify barriers to discharge w/patient and caregiver  - Arrange for needed discharge resources and transportation as appropriate  - Identify discharge learning needs (meds, wound care, etc )  - Arrange for interpretive services to assist at discharge as needed  - Refer to Case Management Department for coordinating discharge planning if the patient needs post-hospital services based on physician/advanced practitioner order or complex needs related to functional status, cognitive ability, or social support system  Outcome: Progressing     Problem: Knowledge Deficit  Goal: Patient/family/caregiver demonstrates understanding of disease process, treatment plan, medications, and discharge instructions  Description: Complete learning assessment and assess knowledge base    Interventions:  - Provide teaching at level of understanding  - Provide teaching via preferred learning methods  Outcome: Progressing

## 2021-07-16 NOTE — ASSESSMENT & PLAN NOTE
Stable  Continue home medication, amlodipine, metoprolol, losartan  Patient takes furosemide as needed for leg swelling  Patient is also on lisinopril in addition to losartan    Discontinue lisinopril  Will advised follow-up with PCP for further blood pressure monitoring adjustment  Lifestyle modification discussed

## 2021-07-16 NOTE — UTILIZATION REVIEW
Initial Clinical Review    Admission: Date/Time/Statement:   Admission Orders (From admission, onward)     Ordered        07/15/21 1614  Place in Observation  Once                   Orders Placed This Encounter   Procedures    Place in Observation     Standing Status:   Standing     Number of Occurrences:   1     Order Specific Question:   Level of Care     Answer:   Med Surg [16]     ED Arrival Information     Expected Arrival Acuity    - 7/15/2021 10:58 Urgent         Means of arrival Escorted by Service Admission type    Walk-In Friend Hospitalist Urgent         Arrival complaint    shortness of breath        Chief Complaint   Patient presents with    Chest Pain     "it feels like there's a weight on may chest"  Started on Tuesday  Consistent discomfort   Generalized Body Aches     Since Monday night  "its like my legs are just on fire", pt states like sunburn  Initial Presentation:   21-year-old female presents with complaints of having a heavy weight on her chest which started on Tuesday she states it has been intermittent but has occurred mostly through most of the day  She denies any shortness of breath she states she has had generalized body aches throughout her whole body along with this  No fevers chills nausea vomiting or diarrhea states she has not had this before  She cannot find anything to alleviate this discomfort and nothing she does seems to aggravate it  Feeling of chest tightness  Assessment & Plan  Presented with complaints of chest tightness, generalized body aches/malaise subjective fever and mild cough since last Monday/Tuesday  Patient reports like having flu-like symptoms  Noted to have low-grade fever 100 1° F, other vital signs stable  CTA, EKG and labs unremarkable  Fully vaccinated for COVID     Denies sick contacts  · Observation  · SARS-CoV-2 PCR  · Supportive and symptomatic treatment  · Trend cardiac markers        Hyperglycemia  Assessment & Plan  Check hemoglobin A1c     Essential hypertension  Assessment & Plan  Stable  Continue home medication, amlodipine, metoprolol, losartan  Patient takes furosemide as needed for leg swelling  Patient is also on lisinopril in addition to losartan    Hold lisinopril     Morbid obesity ([address] )  Assessment & Plan  Patient endorses sedentary lifestyle, increase caloric intake and weight gain  Lifestyle modification  Check lipid profile, hemoglobin A1c     Chronic bilateral low back pain  Assessment & Plan  Reports limited mobility, denies any change in chronic back pain   Continue home medication  PT/OT        ED Triage Vitals   Temperature Pulse Respirations Blood Pressure SpO2   07/15/21 1107 07/15/21 1107 07/15/21 1107 07/15/21 1107 07/15/21 1107   97 7 °F (36 5 °C) 70 20 162/76 97 %      Temp Source Heart Rate Source Patient Position - Orthostatic VS BP Location FiO2 (%)   07/15/21 1107 07/15/21 1107 07/15/21 1107 07/15/21 1107 --   Oral Monitor Lying Right arm       Pain Score       07/15/21 2015       No Pain          Wt Readings from Last 1 Encounters:   07/15/21 126 kg (278 lb 14 1 oz)     Additional Vital Signs:   Date/Time  Temp  Pulse  Resp  BP  MAP (mmHg)  SpO2  O2 Device  Patient Position - Orthostatic VS   07/16/21 03:02:23  --  52Abnormal   --  143/69  94  94 %  --  --   07/15/21 23:45:33  98 3 °F (36 8 °C)  56  --  134/73  93  94 %  --  --   07/15/21 23:44:13  98 3 °F (36 8 °C)  55  --  134/73  93  93 %  --  --   07/15/21 20:19:21  98 3 °F (36 8 °C)  77  17  148/88  108  96 %  None (Room air)  Lying   07/15/21 2015  --  --  18  --  --  96 %  None (Room air)  Lying       Pertinent Labs/Diagnostic Test Results:   Results from last 7 days   Lab Units 07/16/21  0109   SARS-COV-2  Negative     Results from last 7 days   Lab Units 07/16/21  0604 07/15/21  1133   WBC Thousand/uL 5 37 8 57   HEMOGLOBIN g/dL 11 0* 11 3*   HEMATOCRIT % 36 1 36 3   PLATELETS Thousands/uL 173 188   NEUTROS ABS Thousands/µL 3 05 6 83         Results from last 7 days   Lab Units 07/16/21  0604 07/15/21  1133   SODIUM mmol/L 143 139   POTASSIUM mmol/L 3 6 3 7   CHLORIDE mmol/L 108 103   CO2 mmol/L 27 28   ANION GAP mmol/L 8 8   BUN mg/dL 15 10   CREATININE mg/dL 0 65 0 86   EGFR ml/min/1 73sq m 91 69   CALCIUM mg/dL 8 5 8 4     Results from last 7 days   Lab Units 07/15/21  1133   AST U/L 27   ALT U/L 35   ALK PHOS U/L 145*   TOTAL PROTEIN g/dL 6 7   ALBUMIN g/dL 3 3*   TOTAL BILIRUBIN mg/dL 0 65         Results from last 7 days   Lab Units 07/16/21  0604 07/15/21  1133   GLUCOSE RANDOM mg/dL 112 214*             No results found for: BETA-HYDROXYBUTYRATE                   Results from last 7 days   Lab Units 07/16/21  0109 07/15/21  1452 07/15/21  1133   TROPONIN I ng/mL 0 02 0 03 0 03         Results from last 7 days   Lab Units 07/15/21  1133   PROTIME seconds 13 2   INR  1 01   PTT seconds 27             Results from last 7 days   Lab Units 07/15/21  1306   LACTIC ACID mmol/L 1 1             Results from last 7 days   Lab Units 07/15/21  1133   NT-PRO BNP pg/mL 1,364*             Results from last 7 days   Lab Units 07/15/21  1133   LIPASE u/L 151                                             Results from last 7 days   Lab Units 07/15/21  1452 07/15/21  1306   BLOOD CULTURE  Received in Microbiology Lab  Culture in Progress  Received in Microbiology Lab  Culture in Progress  7/15  Cxr=  No acute cardiopulmonary disease  CTA chest pe study=  No acute pulmonary embolism    Ekg=  normal sinus rhythm 61 beats per minute without any acute abnormality or interval changes since 2016    ED Treatment:   Medication Administration from 07/15/2021 1058 to 07/15/2021 2013       Date/Time Order Dose Route Action     07/15/2021 1237 sodium chloride 0 9 % infusion 125 mL/hr Intravenous New Bag     07/15/2021 1440 iohexol (OMNIPAQUE) 350 MG/ML injection (SINGLE-DOSE) 85 mL 85 mL Intravenous Given        Past Medical History:   Diagnosis Date    Arthritis     Chronic pain     GERD (gastroesophageal reflux disease)     History of transfusion     Hypertension     Kidney stones     Stroke [facility])      Present on Admission:   Feeling of chest tightness   Chronic bilateral low back pain   Essential hypertension   Morbid obesity (HCC)   Hyperglycemia      Admitting Diagnosis: Chest pain [R07 9]  Body aches [R52]  Chest pain, unspecified type [R07 9]  Age/Sex: 73 y o  female  Admission Orders:  Telemetry  Pt/ot eval & tx  Scd/foot pumps  Cont pulse ox    Scheduled Medications:  acetaminophen, 975 mg, Oral, Q8H [address]  amLODIPine, 5 mg, Oral, Daily  citalopram, 20 mg, Oral, Daily  cyclobenzaprine, 10 mg, Oral, HS  enoxaparin, 40 mg, Subcutaneous, Daily  furosemide, 20 mg, Oral, Daily  losartan, 50 mg, Oral, HS  metoprolol tartrate, 100 mg, Oral, BID  pantoprazole, 40 mg, Oral, Early Morning      Continuous IV Infusions:  sodium chloride, 125 mL/hr, Intravenous, Continuous      PRN Meds:  ketorolac, 15 mg, Intravenous, Q6H PRN  ondansetron, 4 mg, Intravenous, Q6H PRN  pneumococcal 13-valent conjugate vaccine, 0 5 mL, Intramuscular, Prior to discharge        None    Network Utilization Review Department  ATTENTION: Please call with any questions or concerns to [phone] and carefully listen to the prompts so that you are directed to the right person  All voicemails are confidential   Aj Pappas all requests for admission clinical reviews, approved or denied determinations and any other requests to dedicated fax number below belonging to the campus where the patient is receiving treatment   List of dedicated fax numbers for the Facilities:  [address] DENIALS (Administrative/Medical Necessity) [phone]   [address] (Maternity/NICU/Pediatrics) [phone]   [address] [facility] 3067 [address] [place] [address] [phone] [place] [address] [phone]

## 2021-07-16 NOTE — NURSING NOTE
Patient's discharge instructions and follow up appointments reviewed with patient and daughter  Patient states understanding  Belongings gathered, room checked for additional belongings  Patient transferred via wheel chair to daughters car in stable condition

## 2021-07-17 LAB — BACTERIA UR CULT: NORMAL

## 2021-07-19 LAB
ATRIAL RATE: 66 BPM
P AXIS: 5 DEGREES
PR INTERVAL: 166 MS
QRS AXIS: 41 DEGREES
QRSD INTERVAL: 86 MS
QT INTERVAL: 392 MS
QTC INTERVAL: 410 MS
T WAVE AXIS: 61 DEGREES
VENTRICULAR RATE: 66 BPM

## 2021-07-19 PROCEDURE — 93010 ELECTROCARDIOGRAM REPORT: CPT | Performed by: INTERNAL MEDICINE

## 2021-07-20 LAB
BACTERIA BLD CULT: NORMAL
BACTERIA BLD CULT: NORMAL

## 2022-08-22 ENCOUNTER — VBI (OUTPATIENT)
Dept: ADMINISTRATIVE | Facility: OTHER | Age: 72
End: 2022-08-22

## 2022-08-22 NOTE — TELEPHONE ENCOUNTER
Upon review of the In Basket request we were able to locate, review, and update the patient chart as requested for Medicare AW  Any additional questions or concerns should be emailed to the Practice Liaisons via [email or web address] email, please do not reply via In Basket      Thank you  Saundra Kearney

## 2022-09-03 PROBLEM — N20.0 KIDNEY STONE: Status: ACTIVE | Noted: 2022-09-03

## 2022-09-03 PROBLEM — I63.40 CEREBROVASCULAR ACCIDENT (CVA) DUE TO EMBOLISM OF CEREBRAL ARTERY (HCC): Status: ACTIVE | Noted: 2022-09-03

## 2022-09-06 ENCOUNTER — OFFICE VISIT (OUTPATIENT)
Dept: INTERNAL MEDICINE CLINIC | Facility: CLINIC | Age: 72
End: 2022-09-06
Payer: COMMERCIAL

## 2022-09-06 VITALS
BODY MASS INDEX: 44.32 KG/M2 | HEIGHT: 65 IN | RESPIRATION RATE: 16 BRPM | HEART RATE: 69 BPM | WEIGHT: 266 LBS | DIASTOLIC BLOOD PRESSURE: 78 MMHG | TEMPERATURE: 97.8 F | OXYGEN SATURATION: 97 % | SYSTOLIC BLOOD PRESSURE: 140 MMHG

## 2022-09-06 DIAGNOSIS — N20.0 KIDNEY STONE: ICD-10-CM

## 2022-09-06 DIAGNOSIS — R73.9 HYPERGLYCEMIA: ICD-10-CM

## 2022-09-06 DIAGNOSIS — E66.01 MORBID OBESITY (HCC): ICD-10-CM

## 2022-09-06 DIAGNOSIS — F32.4 MAJOR DEPRESSIVE DISORDER WITH SINGLE EPISODE, IN PARTIAL REMISSION (HCC): ICD-10-CM

## 2022-09-06 DIAGNOSIS — I63.40 CEREBROVASCULAR ACCIDENT (CVA) DUE TO EMBOLISM OF CEREBRAL ARTERY (HCC): ICD-10-CM

## 2022-09-06 DIAGNOSIS — M54.16 RADICULOPATHY OF LUMBAR REGION: ICD-10-CM

## 2022-09-06 DIAGNOSIS — I10 ESSENTIAL HYPERTENSION: Primary | ICD-10-CM

## 2022-09-06 DIAGNOSIS — M48.062 SPINAL STENOSIS OF LUMBAR REGION WITH NEUROGENIC CLAUDICATION: ICD-10-CM

## 2022-09-06 DIAGNOSIS — G89.4 CHRONIC PAIN SYNDROME: ICD-10-CM

## 2022-09-06 DIAGNOSIS — M51.36 OTHER INTERVERTEBRAL DISC DEGENERATION, LUMBAR REGION: ICD-10-CM

## 2022-09-06 DIAGNOSIS — I50.32 CHRONIC DIASTOLIC HEART FAILURE (HCC): ICD-10-CM

## 2022-09-06 DIAGNOSIS — M79.89 LEG SWELLING: ICD-10-CM

## 2022-09-06 PROBLEM — F41.9 ANXIETY DISORDER: Status: ACTIVE | Noted: 2017-06-06

## 2022-09-06 PROCEDURE — 99214 OFFICE O/P EST MOD 30 MIN: CPT | Performed by: INTERNAL MEDICINE

## 2022-09-06 PROCEDURE — 3725F SCREEN DEPRESSION PERFORMED: CPT | Performed by: INTERNAL MEDICINE

## 2022-09-06 PROCEDURE — 1160F RVW MEDS BY RX/DR IN RCRD: CPT | Performed by: INTERNAL MEDICINE

## 2022-09-06 RX ORDER — ERGOCALCIFEROL (VITAMIN D2) 1250 MCG
50000 CAPSULE ORAL WEEKLY
COMMUNITY

## 2022-09-06 RX ORDER — LOSARTAN POTASSIUM 25 MG/1
100 TABLET ORAL
Qty: 90 TABLET | Refills: 0 | Status: SHIPPED | OUTPATIENT
Start: 2022-09-06

## 2022-09-06 RX ORDER — CYCLOBENZAPRINE HCL 10 MG
10 TABLET ORAL
Qty: 90 TABLET | Refills: 0 | Status: SHIPPED | OUTPATIENT
Start: 2022-09-06

## 2022-09-06 RX ORDER — DULOXETIN HYDROCHLORIDE 30 MG/1
30 CAPSULE, DELAYED RELEASE ORAL DAILY
Qty: 90 CAPSULE | Refills: 0 | Status: SHIPPED | OUTPATIENT
Start: 2022-09-06

## 2022-09-06 RX ORDER — DULOXETIN HYDROCHLORIDE 30 MG/1
30 CAPSULE, DELAYED RELEASE ORAL DAILY
COMMUNITY
End: 2022-09-06 | Stop reason: SDUPTHER

## 2022-09-06 RX ORDER — FUROSEMIDE 20 MG/1
40 TABLET ORAL DAILY
Qty: 90 TABLET | Refills: 0 | Status: SHIPPED | OUTPATIENT
Start: 2022-09-06

## 2022-09-06 RX ORDER — METOPROLOL TARTRATE 100 MG/1
100 TABLET ORAL 2 TIMES DAILY
Qty: 180 TABLET | Refills: 0 | Status: SHIPPED | OUTPATIENT
Start: 2022-09-06

## 2022-09-06 RX ORDER — AMLODIPINE BESYLATE 5 MG/1
5 TABLET ORAL DAILY
Qty: 90 TABLET | Refills: 0 | Status: SHIPPED | OUTPATIENT
Start: 2022-09-06

## 2022-09-06 NOTE — ASSESSMENT & PLAN NOTE
Wt Readings from Last 3 Encounters:   09/06/22 121 kg (266 lb)   07/15/21 126 kg (278 lb 14 1 oz)   05/06/21 126 kg (277 lb)   For now patient has no difficulty breathing no leg edema patient is on metoprolol losartan amlodipine blood pressure control Lasix 40 mg instructed about the fluid restriction low-salt diet  CHF is compensated  Cardiac Work UP:  Echo:   EF:  Nuclear Stress test:   Dry weight:  --beta-blocker:   m   --Diuretic:     -  --2 g sodium diet, 1800 cc fluid restriction   Daily weights, call weight gain 2-3 lb in 1 day or 5 lb in 5 days    Pt is advised to continue to follow with cardiologist   Shannan Reynolds to get periodic blood test to monitor side effect of medication as advised by us or cardiologist   CHF education done earlier

## 2022-09-06 NOTE — ASSESSMENT & PLAN NOTE
Previous blood work LDL and A1c seems to be normal instructed to diet exercise consult him to lose weight low-fat low-cholesterol diet

## 2022-09-06 NOTE — ASSESSMENT & PLAN NOTE
Continue secondary prevention with aspirin 81 mg control blood pressure with losartan amlodipine and add statin no recurrence of CVA

## 2022-09-06 NOTE — PROGRESS NOTES
Dr Vides Handing Office Visit Note  22     Karin Nieves 81 y o  female MRN: [id number]  : 1950    Assessment:     1  Essential hypertension  Assessment & Plan:  Blood pressure controlled with metoprolol losartan and amlodipine continue current therapy    Orders:  -     metoprolol tartrate (LOPRESSOR) 100 mg tablet; Take 1 tablet (100 mg total) by mouth 2 (two) times a day  -     amLODIPine (NORVASC) 5 mg tablet; Take 1 tablet (5 mg total) by mouth daily    2  Radiculopathy of lumbar region  Assessment & Plan:  Continue on Flexeril 10 at bedtime and ibuprofen as needed symptoms controlled      3  Cerebrovascular accident (CVA) due to embolism of cerebral artery (HCC)  Assessment & Plan:  Continue secondary prevention with aspirin 81 mg control blood pressure with losartan amlodipine and add statin no recurrence of CVA      4  Other intervertebral disc degeneration, lumbar region  Assessment & Plan:  Continue ibuprofen as needed and Flexeril at bedtime symptoms controlled      5  Kidney stone    6  Chronic pain syndrome  -     cyclobenzaprine (FLEXERIL) 10 mg tablet; Take 1 tablet (10 mg total) by mouth daily at bedtime    7  Spinal stenosis of lumbar region with neurogenic claudication  Assessment & Plan:  Patient's pain is controlled for now taking ibuprofen over-the-counter as needed and muscle muscle relaxant at bedtime      8  Hyperglycemia    9  Morbid obesity ([address] )  Assessment & Plan:  Previous blood work LDL and A1c seems to be normal instructed to diet exercise consult him to lose weight low-fat low-cholesterol diet      10   Chronic diastolic heart failure (HCC)  Assessment & Plan:  Wt Readings from Last 3 Encounters:   22 121 kg (266 lb)   07/15/21 126 kg (278 lb 14 1 oz)   21 126 kg (277 lb)   For now patient has no difficulty breathing no leg edema patient is on metoprolol losartan amlodipine blood pressure control Lasix 40 mg instructed about the fluid restriction low-salt diet  CHF is compensated  Cardiac Work UP:  Echo:   EF:  Nuclear Stress test:   Dry weight:  --beta-blocker:   m   --Diuretic:     -  --2 g sodium diet, 1800 cc fluid restriction  Daily weights, call weight gain 2-3 lb in 1 day or 5 lb in 5 days    Pt is advised to continue to follow with cardiologist   Deanna Aldana to get periodic blood test to monitor side effect of medication as advised by us or cardiologist   CHF education done earlier            11  Leg swelling  -     furosemide (LASIX) 20 mg tablet; Take 2 tablets (40 mg total) by mouth daily As needed    12  Major depressive disorder with single episode, in partial remission (HCC)  -     DULoxetine (CYMBALTA) 30 mg delayed release capsule; Take 1 capsule (30 mg total) by mouth daily        Discussion Summary and Plan: Today's care plan and medications were reviewed with patient in detail and all their questions answered to their satisfaction  Chief Complaint   Patient presents with    Hypertension    Hyperlipidemia    Congestive Heart Failure    Obesity    Pain    Back Pain    GERD    CKD III    Follow-up      Subjective:  Patient came in for follow-up multiple medical condition mainly including chronic pain due to lumbar radiculopathy degenerate joint disease hypertension chronic kidney disease congestion chronic diastolic CHF anxiety depression history of CVA for now no new symptom patient is of Cymbalta 30 mg at bedtime and Celexa 40 so was recommended to cut down to Celexa 20 advised to be seen by psychiatrist patient refused patient claims he is doing fine does not feel depressed now is able to sleep at night depression improved back pain intermittently radiating lower extremity denies any difficulty breathing no chest pain overall improving all the records reviewed  BMI Counseling: Body mass index is 44 26 kg/m²   The BMI is above normal  Nutrition recommendations include decreasing portion sizes, encouraging healthy choices of fruits and vegetables, decreasing fast food intake, consuming healthier snacks, limiting drinks that contain sugar, increasing intake of lean protein, reducing intake of saturated and trans fat and reducing intake of cholesterol  Exercise recommendations include exercising 3-5 times per week  Rationale for BMI follow-up plan is due to patient being overweight or obese  The following portions of the patient's history were reviewed and updated as appropriate: allergies, current medications, past family history, past medical history, past social history, past surgical history and problem list     Review of Systems      Historical Information   Patient Active Problem List   Diagnosis    Other specified anemias    Chronic pain syndrome    Low back pain    Radiculopathy of lumbar region    Spinal stenosis of lumbar region    Other intervertebral disc degeneration, lumbar region    Chronic bilateral low back pain    Essential hypertension    History of gastric bypass    Morbid obesity with BMI of 40 0-44 9, adult ([facility] )    Hyperglycemia    Cerebrovascular accident (CVA) due to embolism of cerebral artery ([facility] )    Kidney stone    Chronic diastolic CHF (congestive heart failure) ([address] )    Cardiomegaly    Anxiety disorder    Transient ischemic attack     Past Medical History:   Diagnosis Date    Anemia     Anxiety disorder     Arthritis     Blood disorder     Bronchitis     Chronic pain     Depression     Dizziness     GERD (gastroesophageal reflux disease)     History of transfusion     Hypertension     Kidney stones     Lightheadedness     Obesity     SOB (shortness of breath)     Stroke [facility])      Past Surgical History:   Procedure Laterality Date    ABDOMINAL SURGERY      APPENDECTOMY      CHOLECYSTECTOMY      EGD AND COLONOSCOPY N/A 07/11/2016    Procedure: EGD AND COLONOSCOPY;  Surgeon: Sheila Miller MD;  Location: [facility];   Service:     EPIDURAL BLOCK INJECTION Right 07/13/2017    Procedure: BLOCK / INJECTION EPIDURAL STEROID TRANSFORAMINAL  L4-5;  Surgeon: Audie Gannon MD;  Location: [facility] MAIN OR;  Service: Pain Management     EPIDURAL BLOCK INJECTION Right 08/09/2017    Procedure: BLOCK / INJECTION EPIDURAL STEROID TRANSFORAMINAL L4-5- REPEAT;  Surgeon: Audie Gannon MD;  Location: [facility] MAIN OR;  Service: Pain Management     EPIDURAL BLOCK INJECTION Left 02/16/2018    Procedure: LEFT L4 AND L5 TRANSFORAMINAL EPIDURAL STEROID INJECTION;  Surgeon: Audie Gannon MD;  Location: [facility] MAIN OR;  Service: Pain Management     EYE SURGERY      GASTRIC BYPASS      MAMMO (HISTORICAL) Bilateral 05/06/2021    GA CYSTO/URETERO W/LITHOTRIPSY &INDWELL STENT INSRT Left 07/13/2016    Procedure: CYSTOSCOPY URETEROSCOPY WITH LITHOTRIPSY HOLMIUM LASER, RETROGRADE PYELOGRAM AND INSERTION STENT URETERAL;  Surgeon: Yenni Waters MD;  Location: [facility] MAIN OR;  Service: Urology    TONSILLECTOMY       Social History     Substance and Sexual Activity   Alcohol Use Never     Social History     Substance and Sexual Activity   Drug Use No     Social History     Tobacco Use   Smoking Status Never Smoker   Smokeless Tobacco Never Used     Family History   Problem Relation Age of Onset    No Known Problems Daughter     No Known Problems Daughter     No Known Problems Maternal Aunt     No Known Problems Maternal Aunt     No Known Problems Paternal Aunt     No Known Problems Paternal Aunt     No Known Problems Paternal Aunt     Diabetes Family     Heart disease Family         coronary arteriosclerosis     Health Maintenance Due   Topic    Hepatitis C Screening     Osteoporosis Screening     Pneumococcal Vaccine: 65+ Years (2 - PCV)    Fall Risk     Urinary Incontinence Screening     COVID-19 Vaccine (3 - Booster for Moderna series)    Breast Cancer Screening: Mammogram     Influenza Vaccine (1)      Meds/Allergies       Current Outpatient Medications:     amLODIPine (NORVASC) 5 mg tablet, Take 1 tablet (5 mg total) by mouth daily, Disp: 90 tablet, Rfl: 0    citalopram (CeleXA) 20 mg tablet, Take 40 mg by mouth daily, Disp: , Rfl:     cyclobenzaprine (FLEXERIL) 10 mg tablet, Take 1 tablet (10 mg total) by mouth daily at bedtime, Disp: 90 tablet, Rfl: 0    DULoxetine (CYMBALTA) 30 mg delayed release capsule, Take 1 capsule (30 mg total) by mouth daily, Disp: 90 capsule, Rfl: 0    ergocalciferol (ERGOCALCIFEROL) 1 25 MG (69350 UT) capsule, Take 50,000 Units by mouth once a week, Disp: , Rfl:     furosemide (LASIX) 20 mg tablet, Take 2 tablets (40 mg total) by mouth daily As needed, Disp: 90 tablet, Rfl: 0    losartan (COZAAR) 25 mg tablet, Take 4 tablets (100 mg total) by mouth daily at bedtime, Disp: 90 tablet, Rfl: 0    metoprolol tartrate (LOPRESSOR) 100 mg tablet, Take 1 tablet (100 mg total) by mouth 2 (two) times a day, Disp: 180 tablet, Rfl: 0      Objective:    Vitals:   /78 (BP Location: Right arm, Patient Position: Sitting, Cuff Size: Standard)   Pulse 69   Temp 97 8 °F (36 6 °C) (Temporal)   Resp 16   Ht 5' 5" (1 651 m)   Wt 121 kg (266 lb)   SpO2 97%   BMI 44 26 kg/m²   Body mass index is 44 26 kg/m²  Vitals:    09/06/22 1330   Weight: 121 kg (266 lb)       Physical Exam    Lab Review   No visits with results within 2 Month(s) from this visit     Latest known visit with results is:   Admission on 07/15/2021, Discharged on 07/16/2021   Component Date Value Ref Range Status    WBC 07/15/2021 8 57  4 31 - 10 16 Thousand/uL Final    RBC 07/15/2021 4 30  3 81 - 5 12 Million/uL Final    Hemoglobin 07/15/2021 11 3 (A) 11 5 - 15 4 g/dL Final    Hematocrit 07/15/2021 36 3  34 8 - 46 1 % Final    MCV 07/15/2021 84  82 - 98 fL Final    MCH 07/15/2021 26 3 (A) 26 8 - 34 3 pg Final    MCHC 07/15/2021 31 1 (A) 31 4 - 37 4 g/dL Final    RDW 07/15/2021 14 7  11 6 - 15 1 % Final    MPV 07/15/2021 9 6  8 9 - 12 7 fL Final    Platelets 25/48/4169 188  149 - 390 Thousands/uL Final    nRBC 07/15/2021 0  /100 WBCs Final    Neutrophils Relative 07/15/2021 79 (A) 43 - 75 % Final    Immat GRANS % 07/15/2021 0  0 - 2 % Final    Lymphocytes Relative 07/15/2021 9 (A) 14 - 44 % Final    Monocytes Relative 07/15/2021 9  4 - 12 % Final    Eosinophils Relative 07/15/2021 2  0 - 6 % Final    Basophils Relative 07/15/2021 1  0 - 1 % Final    Neutrophils Absolute 07/15/2021 6 83  1 85 - 7 62 Thousands/µL Final    Immature Grans Absolute 07/15/2021 0 02  0 00 - 0 20 Thousand/uL Final    Lymphocytes Absolute 07/15/2021 0 74  0 60 - 4 47 Thousands/µL Final    Monocytes Absolute 07/15/2021 0 80  0 17 - 1 22 Thousand/µL Final    Eosinophils Absolute 07/15/2021 0 13  0 00 - 0 61 Thousand/µL Final    Basophils Absolute 07/15/2021 0 05  0 00 - 0 10 Thousands/µL Final    Protime 07/15/2021 13 2  11 6 - 14 5 seconds Final    INR 07/15/2021 1 01  0 84 - 1 19 Final    PTT 07/15/2021 27  23 - 37 seconds Final    Therapeutic Heparin Range =  60-90 seconds    Sodium 07/15/2021 139  136 - 145 mmol/L Final    Potassium 07/15/2021 3 7  3 5 - 5 3 mmol/L Final    Chloride 07/15/2021 103  100 - 108 mmol/L Final    CO2 07/15/2021 28  21 - 32 mmol/L Final    ANION GAP 07/15/2021 8  4 - 13 mmol/L Final    BUN 07/15/2021 10  5 - 25 mg/dL Final    Creatinine 07/15/2021 0 86  0 60 - 1 30 mg/dL Final    Standardized to IDMS reference method    Glucose 07/15/2021 214 (A) 65 - 140 mg/dL Final    If the patient is fasting, the ADA then defines impaired fasting glucose as > 100 mg/dL and diabetes as > or equal to 123 mg/dL  Specimen collection should occur prior to Sulfasalazine administration due to the potential for falsely depressed results  Specimen collection should occur prior to Sulfapyridine administration due to the potential for falsely elevated results      Calcium 07/15/2021 8 4  8 3 - 10 1 mg/dL Final    Corrected Calcium 07/15/2021 9 0  8 3 - 10 1 mg/dL Final    AST 07/15/2021 27  5 - 45 U/L Final Specimen collection should occur prior to Sulfasalazine administration due to the potential for falsely depressed results   ALT 07/15/2021 35  12 - 78 U/L Final    Specimen collection should occur prior to Sulfasalazine administration due to the potential for falsely depressed results   Alkaline Phosphatase 07/15/2021 145 (A) 46 - 116 U/L Final    Total Protein 07/15/2021 6 7  6 4 - 8 2 g/dL Final    Albumin 07/15/2021 3 3 (A) 3 5 - 5 0 g/dL Final    Total Bilirubin 07/15/2021 0 65  0 20 - 1 00 mg/dL Final    Use of this assay is not recommended for patients undergoing treatment with eltrombopag due to the potential for falsely elevated results   eGFR 07/15/2021 69  ml/min/1 73sq m Final    Lipase 07/15/2021 151  73 - 393 u/L Final    Troponin I 07/15/2021 0 03  <=0 04 ng/mL Final    [employer] Chemistry analyzer 99% cutoff is > 0 04 ng/mL in network labs     o cTnI 99% cutoff is useful only when applied to patients in the clinical setting of myocardial ischemia   o cTnI 99% cutoff should be interpreted in the context of clinical history, ECG findings and possibly cardiac imaging to establish correct diagnosis  o cTnI 99% cutoff may be suggestive but clearly not indicative of a coronary event without the clinical setting of myocardial ischemia   NT-proBNP 07/15/2021 1,364 (A) <125 pg/mL Final    Blood Culture 07/15/2021 No Growth After 5 Days  Final    Blood Culture 07/15/2021 No Growth After 5 Days     Final    LACTIC ACID 07/15/2021 1 1  0 5 - 2 0 mmol/L Final    Urine Culture 07/15/2021 60,000-69,000 cfu/ml    Final    Mixed Contaminants X5    Troponin I 07/15/2021 0 03  <=0 04 ng/mL Final    [employer] Chemistry analyzer 99% cutoff is > 0 04 ng/mL in network labs     o cTnI 99% cutoff is useful only when applied to patients in the clinical setting of myocardial ischemia   o cTnI 99% cutoff should be interpreted in the context of clinical history, ECG findings and possibly cardiac imaging to establish correct diagnosis  o cTnI 99% cutoff may be suggestive but clearly not indicative of a coronary event without the clinical setting of myocardial ischemia   SARS-CoV-2 07/16/2021 Negative  Negative Final    Troponin I 07/16/2021 0 02  <=0 04 ng/mL Final    [employer] Chemistry analyzer 99% cutoff is > 0 04 ng/mL in network labs     o cTnI 99% cutoff is useful only when applied to patients in the clinical setting of myocardial ischemia   o cTnI 99% cutoff should be interpreted in the context of clinical history, ECG findings and possibly cardiac imaging to establish correct diagnosis  o cTnI 99% cutoff may be suggestive but clearly not indicative of a coronary event without the clinical setting of myocardial ischemia   Cholesterol 07/16/2021 157  50 - 200 mg/dL Final    Cholesterol:       Desirable         <200 mg/dl       Borderline         200-239 mg/dl       High              >239           Triglycerides 07/16/2021 96  <=150 mg/dL Final    Triglyceride:     Normal          <150 mg/dl     Borderline High 150-199 mg/dl     High            200-499 mg/dl        Very High       >499 mg/dl    Specimen collection should occur prior to N-Acetylcysteine or Metamizole administration due to the potential for falsely depressed results   HDL, Direct 07/16/2021 39 (A) >=40 mg/dL Final    HDL Cholesterol:       Low     <41 mg/dL  Specimen collection should occur prior to Metamizole administration due to the potential for falsley depressed results   LDL Calculated 07/16/2021 99  0 - 100 mg/dL Final    LDL Cholesterol:     Optimal           <100 mg/dl     Near Optimal      100-129 mg/dl     Above Optimal       Borderline High 130-159 mg/dl       High            160-189 mg/dl       Very High       >189 mg/dl         This screening LDL is a calculated result  It does not have the accuracy of the Direct Measured LDL in the monitoring of patients with hyperlipidemia and/or statin therapy     Direct Measure LDL (OQU899) must be ordered separately in these patients   Sodium 07/16/2021 143  136 - 145 mmol/L Final    Potassium 07/16/2021 3 6  3 5 - 5 3 mmol/L Final    Chloride 07/16/2021 108  100 - 108 mmol/L Final    CO2 07/16/2021 27  21 - 32 mmol/L Final    ANION GAP 07/16/2021 8  4 - 13 mmol/L Final    BUN 07/16/2021 15  5 - 25 mg/dL Final    Creatinine 07/16/2021 0 65  0 60 - 1 30 mg/dL Final    Standardized to IDMS reference method    Glucose 07/16/2021 112  65 - 140 mg/dL Final    If the patient is fasting, the ADA then defines impaired fasting glucose as > 100 mg/dL and diabetes as > or equal to 123 mg/dL  Specimen collection should occur prior to Sulfasalazine administration due to the potential for falsely depressed results  Specimen collection should occur prior to Sulfapyridine administration due to the potential for falsely elevated results   Glucose, Fasting 07/16/2021 112 (A) 65 - 99 mg/dL Final    Specimen collection should occur prior to Sulfasalazine administration due to the potential for falsely depressed results  Specimen collection should occur prior to Sulfapyridine administration due to the potential for falsely elevated results      Calcium 07/16/2021 8 5  8 3 - 10 1 mg/dL Final    eGFR 07/16/2021 91  ml/min/1 73sq m Final    WBC 07/16/2021 5 37  4 31 - 10 16 Thousand/uL Final    RBC 07/16/2021 4 20  3 81 - 5 12 Million/uL Final    Hemoglobin 07/16/2021 11 0 (A) 11 5 - 15 4 g/dL Final    Hematocrit 07/16/2021 36 1  34 8 - 46 1 % Final    MCV 07/16/2021 86  82 - 98 fL Final    MCH 07/16/2021 26 2 (A) 26 8 - 34 3 pg Final    MCHC 07/16/2021 30 5 (A) 31 4 - 37 4 g/dL Final    RDW 07/16/2021 15 0  11 6 - 15 1 % Final    MPV 07/16/2021 9 4  8 9 - 12 7 fL Final    Platelets 02/84/9621 173  149 - 390 Thousands/uL Final    nRBC 07/16/2021 0  /100 WBCs Final    Neutrophils Relative 07/16/2021 56  43 - 75 % Final    Immat GRANS % 07/16/2021 0  0 - 2 % Final    Lymphocytes Relative 07/16/2021 26  14 - 44 % Final    Monocytes Relative 07/16/2021 13 (A) 4 - 12 % Final    Eosinophils Relative 07/16/2021 4  0 - 6 % Final    Basophils Relative 07/16/2021 1  0 - 1 % Final    Neutrophils Absolute 07/16/2021 3 05  1 85 - 7 62 Thousands/µL Final    Immature Grans Absolute 07/16/2021 0 01  0 00 - 0 20 Thousand/uL Final    Lymphocytes Absolute 07/16/2021 1 39  0 60 - 4 47 Thousands/µL Final    Monocytes Absolute 07/16/2021 0 67  0 17 - 1 22 Thousand/µL Final    Eosinophils Absolute 07/16/2021 0 21  0 00 - 0 61 Thousand/µL Final    Basophils Absolute 07/16/2021 0 04  0 00 - 0 10 Thousands/µL Final    Hemoglobin A1C 07/16/2021 5 9 (A) Normal 3 8-5 6%; PreDiabetic 5 7-6 4%; Diabetic >=6 5%; Glycemic control for adults with diabetes <7 0% % Final    EAG 07/16/2021 123  mg/dl Final    Ventricular Rate 07/15/2021 66  BPM Final    Atrial Rate 07/15/2021 66  BPM Final    NJ Interval 07/15/2021 166  ms Final    QRSD Interval 07/15/2021 86  ms Final    QT Interval 07/15/2021 392  ms Final    QTC Interval 07/15/2021 410  ms Final    P Axis 07/15/2021 5  degrees Final    QRS Axis 07/15/2021 41  degrees Final    T Wave Axis 07/15/2021 61  degrees Final     Patient all the medical issues listed under visit diagnosis discussed at length addressed total time spent 30 minutes more than 50% time spent direct care and counseling of the patient    Patient Instructions   Pt is symptom free for this problem  This diagnosis or problem is stable/well controlled  Patient is advised to continue same meds as outlined in medicine list  Pt is advised to continue to follow with specialist if they are following for this suoth Roach MD        "This note has been constructed using a voice recognition system  Therefore there may be syntax, spelling, and/or grammatical errors   Please call if you have any questions  "

## 2022-09-06 NOTE — ASSESSMENT & PLAN NOTE
Patient's pain is controlled for now taking ibuprofen over-the-counter as needed and muscle muscle relaxant at bedtime

## 2022-11-07 ENCOUNTER — OFFICE VISIT (OUTPATIENT)
Dept: INTERNAL MEDICINE CLINIC | Facility: CLINIC | Age: 72
End: 2022-11-07

## 2022-11-07 VITALS
WEIGHT: 268 LBS | HEART RATE: 77 BPM | RESPIRATION RATE: 16 BRPM | SYSTOLIC BLOOD PRESSURE: 146 MMHG | OXYGEN SATURATION: 97 % | HEIGHT: 65 IN | DIASTOLIC BLOOD PRESSURE: 80 MMHG | TEMPERATURE: 98 F | BODY MASS INDEX: 44.65 KG/M2

## 2022-11-07 DIAGNOSIS — G45.9 TRANSIENT ISCHEMIC ATTACK: ICD-10-CM

## 2022-11-07 DIAGNOSIS — I63.40 CEREBROVASCULAR ACCIDENT (CVA) DUE TO EMBOLISM OF CEREBRAL ARTERY (HCC): Primary | ICD-10-CM

## 2022-11-07 DIAGNOSIS — I50.32 CHRONIC DIASTOLIC CHF (CONGESTIVE HEART FAILURE) (HCC): ICD-10-CM

## 2022-11-07 DIAGNOSIS — M48.062 SPINAL STENOSIS OF LUMBAR REGION WITH NEUROGENIC CLAUDICATION: ICD-10-CM

## 2022-11-07 DIAGNOSIS — E66.01 MORBID OBESITY WITH BMI OF 40.0-44.9, ADULT (HCC): ICD-10-CM

## 2022-11-07 DIAGNOSIS — I51.7 CARDIOMEGALY: ICD-10-CM

## 2022-11-07 DIAGNOSIS — F33.42 RECURRENT MAJOR DEPRESSIVE DISORDER, IN FULL REMISSION (HCC): ICD-10-CM

## 2022-11-07 DIAGNOSIS — I10 ESSENTIAL HYPERTENSION: ICD-10-CM

## 2022-11-07 PROBLEM — D50.9 IRON DEFICIENCY ANEMIA: Status: ACTIVE | Noted: 2017-05-24

## 2022-11-07 NOTE — ASSESSMENT & PLAN NOTE
Wt Readings from Last 3 Encounters:   11/07/22 122 kg (268 lb)   09/06/22 121 kg (266 lb)   07/15/21 126 kg (278 lb 14 1 oz)   CHF seems to be controlled trace leg edema no difficulty breathing exam negative for CHF continue fluid restriction low-salt diet antihypertensive and Lasix

## 2022-11-07 NOTE — PROGRESS NOTES
Dr Marlee Burgess Office Visit Note  22     Karin Nieves 81 y o  female MRN: [id number]  : 1950    Assessment:     1  Cerebrovascular accident (CVA) due to embolism of cerebral artery [facility])  Assessment & Plan:  Continue aspirin antihypertensive close follow-up stable for now      2  Essential hypertension  Assessment & Plan:  Blood pressure control with low-salt diet and amlodipine losartan and metoprolol continue current treatment      3  Cardiomegaly  Assessment & Plan:  Continue on amlodipine losartan and metoprolol low-salt diet exercise lose weight      4  Spinal stenosis of lumbar region with neurogenic claudication  Assessment & Plan:  Patient's pain is controlled for now taking ibuprofen over-the-counter as needed and muscle muscle relaxant at bedtime no change since the last visit stable      5  Morbid obesity with BMI of 40 0-44 9, adult [facility])  Assessment & Plan:  Counseling done to lose weight low-fat low-cholesterol diet along with cut down calories cut down the carbohydrates change lifestyle increase activity      6  Chronic diastolic CHF (congestive heart failure) (HCC)  Assessment & Plan:  Wt Readings from Last 3 Encounters:   22 122 kg (268 lb)   22 121 kg (266 lb)   07/15/21 126 kg (278 lb 14 1 oz)   CHF seems to be controlled trace leg edema no difficulty breathing exam negative for CHF continue fluid restriction low-salt diet antihypertensive and Lasix            7  Transient ischemic attack  Assessment & Plan:  Continue secondary prevention with aspirin 81 mg control blood pressure with losartan amlodipine and add statin no recurrence of CVA          Discussion Summary and Plan: Today's care plan and medications were reviewed with patient in detail and all their questions answered to their satisfaction      Chief Complaint   Patient presents with   • Follow-up      Subjective:  Patient came in for follow-up for chronic medical condition and intermittent back pain which has improved with ibuprofen  Patient here for review of chronic medical problems and review of the labs and imaging if it is applicable  Currently has no specific complaints other than mentioned in the review of systems  Denies chest pain, SOB, cough, abdominal pain, nausea, vomiting, fever, chills, lightheadedness, dizziness,headache, tingling or numbness  No bowel or bladder problem  The following portions of the patient's history were reviewed and updated as appropriate: allergies, current medications, past family history, past medical history, past social history, past surgical history and problem list     Review of Systems   Constitutional: Positive for activity change and fatigue  Negative for appetite change, chills, diaphoresis, fever and unexpected weight change  HENT: Negative for congestion, dental problem, drooling, ear discharge, ear pain, facial swelling, hearing loss, mouth sores, nosebleeds, postnasal drip, rhinorrhea, sinus pressure, sneezing, sore throat, tinnitus, trouble swallowing and voice change  Eyes: Negative for photophobia, pain, discharge, redness, itching and visual disturbance  Respiratory: Negative for apnea, cough, choking, chest tightness, shortness of breath, wheezing and stridor  Cardiovascular: Negative for chest pain, palpitations and leg swelling  Gastrointestinal: Negative for abdominal distention, abdominal pain, anal bleeding, blood in stool, constipation, diarrhea, nausea, rectal pain and vomiting  Endocrine: Negative for cold intolerance, heat intolerance, polydipsia, polyphagia and polyuria  Genitourinary: Negative for decreased urine volume, difficulty urinating, dysuria, enuresis, flank pain, frequency, genital sores, hematuria and urgency  Musculoskeletal: Positive for arthralgias, back pain, gait problem, joint swelling, myalgias, neck pain and neck stiffness  Skin: Negative for color change, pallor, rash and wound     Allergic/Immunologic: Negative  Negative for environmental allergies, food allergies and immunocompromised state  Neurological: Negative for dizziness, tremors, seizures, syncope, facial asymmetry, speech difficulty, weakness, light-headedness, numbness and headaches  Psychiatric/Behavioral: Negative for agitation, behavioral problems, confusion, decreased concentration, dysphoric mood, hallucinations, self-injury, sleep disturbance and suicidal ideas  The patient is nervous/anxious  The patient is not hyperactive  Historical Information   Patient Active Problem List   Diagnosis   • Other specified anemias   • Chronic pain syndrome   • Low back pain   • Radiculopathy of lumbar region   • Spinal stenosis of lumbar region   • Other intervertebral disc degeneration, lumbar region   • Chronic bilateral low back pain   • Essential hypertension   • History of gastric bypass   • Morbid obesity with BMI of 40 0-44 9, adult ([facility])   • Hyperglycemia   • Cerebrovascular accident (CVA) due to embolism of cerebral artery ([facility])   • Kidney stone   • Chronic diastolic CHF (congestive heart failure) ([facility])   • Cardiomegaly   • Anxiety disorder   • Transient ischemic attack   • Esophagitis, reflux   • Iron deficiency anemia     Past Medical History:   Diagnosis Date   • Anemia    • Anxiety disorder    • Arthritis    • Blood disorder    • Bronchitis    • Chronic pain    • Depression    • Dizziness    • GERD (gastroesophageal reflux disease)    • History of transfusion    • Hypertension    • Kidney stones    • Lightheadedness    • Obesity    • SOB (shortness of breath)    • Stroke [facility])      Past Surgical History:   Procedure Laterality Date   • ABDOMINAL SURGERY     • APPENDECTOMY     • CHOLECYSTECTOMY     • EGD AND COLONOSCOPY N/A 07/11/2016    Procedure: EGD AND COLONOSCOPY;  Surgeon: Cesario Canales MD;  Location: [facility] GI LAB;   Service:    • EPIDURAL BLOCK INJECTION Right 07/13/2017    Procedure: BLOCK / INJECTION EPIDURAL STEROID TRANSFORAMINAL  L4-5;  Surgeon: Mary Jane Beyer MD;  Location: [facility] MAIN OR;  Service: Pain Management    • EPIDURAL BLOCK INJECTION Right 08/09/2017    Procedure: BLOCK / INJECTION EPIDURAL STEROID TRANSFORAMINAL L4-5- REPEAT;  Surgeon: Mary Jane Beyer MD;  Location: [facility] MAIN OR;  Service: Pain Management    • EPIDURAL BLOCK INJECTION Left 02/16/2018    Procedure: LEFT L4 AND L5 TRANSFORAMINAL EPIDURAL STEROID INJECTION;  Surgeon: Mary Jane Beyer MD;  Location: [facility] MAIN OR;  Service: Pain Management    • EYE SURGERY     • GASTRIC BYPASS     • MAMMO (HISTORICAL) Bilateral 05/06/2021   • WI CYSTO/URETERO W/LITHOTRIPSY &INDWELL STENT INSRT Left 07/13/2016    Procedure: CYSTOSCOPY URETEROSCOPY WITH LITHOTRIPSY HOLMIUM LASER, RETROGRADE PYELOGRAM AND INSERTION STENT URETERAL;  Surgeon: Jovani Perry MD;  Location: [facility] MAIN OR;  Service: Urology   • TONSILLECTOMY       Social History     Substance and Sexual Activity   Alcohol Use Never     Social History     Substance and Sexual Activity   Drug Use No     Social History     Tobacco Use   Smoking Status Never Smoker   Smokeless Tobacco Never Used     Family History   Problem Relation Age of Onset   • No Known Problems Daughter    • No Known Problems Daughter    • No Known Problems Maternal Aunt    • No Known Problems Maternal Aunt    • No Known Problems Paternal Aunt    • No Known Problems Paternal Aunt    • No Known Problems Paternal Aunt    • Diabetes Family    • Heart disease Family         coronary arteriosclerosis     Health Maintenance Due   Topic   • Hepatitis C Screening    • Osteoporosis Screening    • Pneumococcal Vaccine: 65+ Years (2 - PCV)   • COVID-19 Vaccine (3 - Booster for Moderna series)   • Breast Cancer Screening: Mammogram    • Influenza Vaccine (1)      Meds/Allergies       Current Outpatient Medications:   •  amLODIPine (NORVASC) 5 mg tablet, Take 1 tablet (5 mg total) by mouth daily, Disp: 90 tablet, Rfl: 0  •  citalopram (CeleXA) 20 mg tablet, Take 40 mg by mouth daily, Disp: , Rfl:   •  cyclobenzaprine (FLEXERIL) 10 mg tablet, Take 1 tablet (10 mg total) by mouth daily at bedtime, Disp: 90 tablet, Rfl: 0  •  DULoxetine (CYMBALTA) 30 mg delayed release capsule, Take 1 capsule (30 mg total) by mouth daily, Disp: 90 capsule, Rfl: 0  •  ergocalciferol (ERGOCALCIFEROL) 1 25 MG (56707 UT) capsule, Take 50,000 Units by mouth once a week, Disp: , Rfl:   •  furosemide (LASIX) 20 mg tablet, Take 2 tablets (40 mg total) by mouth daily As needed, Disp: 90 tablet, Rfl: 0  •  losartan (COZAAR) 25 mg tablet, Take 4 tablets (100 mg total) by mouth daily at bedtime, Disp: 90 tablet, Rfl: 0  •  metoprolol tartrate (LOPRESSOR) 100 mg tablet, Take 1 tablet (100 mg total) by mouth 2 (two) times a day, Disp: 180 tablet, Rfl: 0      Objective:    Vitals:   /80   Pulse 77   Temp 98 °F (36 7 °C)   Resp 16   Ht 5' 5" (1 651 m)   Wt 122 kg (268 lb)   SpO2 97%   BMI 44 60 kg/m²   Body mass index is 44 6 kg/m²  Vitals:    11/07/22 1315   Weight: 122 kg (268 lb)       Physical Exam  Vitals and nursing note reviewed  Constitutional:       General: She is not in acute distress  Appearance: She is well-developed  She is obese  She is not ill-appearing, toxic-appearing or diaphoretic  HENT:      Head: Normocephalic and atraumatic  Right Ear: External ear normal       Left Ear: External ear normal       Nose: Nose normal       Mouth/Throat:      Pharynx: No oropharyngeal exudate  Eyes:      General: Lids are normal  Lids are everted, no foreign bodies appreciated  No scleral icterus  Right eye: No discharge  Left eye: No discharge  Conjunctiva/sclera: Conjunctivae normal       Pupils: Pupils are equal, round, and reactive to light  Neck:      Thyroid: No thyromegaly  Vascular: Normal carotid pulses  No carotid bruit, hepatojugular reflux or JVD  Trachea: No tracheal tenderness or tracheal deviation     Cardiovascular: Rate and Rhythm: Normal rate and regular rhythm  Pulses: Normal pulses  Heart sounds: Normal heart sounds  No murmur heard  No friction rub  No gallop  Pulmonary:      Effort: Pulmonary effort is normal  No respiratory distress  Breath sounds: Normal breath sounds  No stridor  No wheezing or rales  Chest:      Chest wall: No tenderness  Abdominal:      General: Bowel sounds are normal  There is no distension  Palpations: Abdomen is soft  There is no mass  Tenderness: There is no abdominal tenderness  There is no guarding or rebound  Musculoskeletal:         General: Swelling and deformity present  No tenderness  Normal range of motion  Cervical back: Normal range of motion and neck supple  No edema, erythema or rigidity  No spinous process tenderness or muscular tenderness  Normal range of motion  Lymphadenopathy:      Head:      Right side of head: No submental, submandibular, tonsillar, preauricular or posterior auricular adenopathy  Left side of head: No submental, submandibular, tonsillar, preauricular, posterior auricular or occipital adenopathy  Cervical: No cervical adenopathy  Right cervical: No superficial, deep or posterior cervical adenopathy  Left cervical: No superficial, deep or posterior cervical adenopathy  Upper Body:      Right upper body: No pectoral adenopathy  Left upper body: No pectoral adenopathy  Skin:     General: Skin is warm and dry  Coloration: Skin is not pale  Findings: No erythema or rash  Neurological:      Mental Status: She is alert and oriented to person, place, and time  Cranial Nerves: No cranial nerve deficit  Sensory: No sensory deficit  Motor: No tremor, abnormal muscle tone or seizure activity  Coordination: Coordination normal       Gait: Gait abnormal       Deep Tendon Reflexes: Reflexes are normal and symmetric   Reflexes normal    Psychiatric:         Behavior: Behavior normal          Thought Content: Thought content normal          Judgment: Judgment normal      ALL THE MEDICAL PROBLEMS LISTED UNDER VISIT DIAGNOSIS DISCUSSED AT LENGTH ESPECIALLY EMILIO EXERCISE FOR THE LOWER BACK PAIN AND PREVENTION OF THE CHF MONITORING DAILY WEIGHT LOW-SALT DIET FLUID RESTRICTION AND WEIGHT LOSS COUNSELING DONE TO LOSE WEIGHT AWAIT CUT DOWN THE CALORIES CARBS AS CHANGE IN LIFESTYLE AND ALSO DISCUSS TO [facility] TOTAL TIME SPENT WAS 30 MINUTES MORE THAN 50% TIME SPENT IN DIRECT CARE AND COUNSELING WITH THE PATIENT    Lab Review   No visits with results within 2 Month(s) from this visit     Latest known visit with results is:   Admission on 07/15/2021, Discharged on 07/16/2021   Component Date Value Ref Range Status   • WBC 07/15/2021 8 57  4 31 - 10 16 Thousand/uL Final   • RBC 07/15/2021 4 30  3 81 - 5 12 Million/uL Final   • Hemoglobin 07/15/2021 11 3 (A) 11 5 - 15 4 g/dL Final   • Hematocrit 07/15/2021 36 3  34 8 - 46 1 % Final   • MCV 07/15/2021 84  82 - 98 fL Final   • MCH 07/15/2021 26 3 (A) 26 8 - 34 3 pg Final   • MCHC 07/15/2021 31 1 (A) 31 4 - 37 4 g/dL Final   • RDW 07/15/2021 14 7  11 6 - 15 1 % Final   • MPV 07/15/2021 9 6  8 9 - 12 7 fL Final   • Platelets 04/60/9869 188  149 - 390 Thousands/uL Final   • nRBC 07/15/2021 0  /100 WBCs Final   • Neutrophils Relative 07/15/2021 79 (A) 43 - 75 % Final   • Immat GRANS % 07/15/2021 0  0 - 2 % Final   • Lymphocytes Relative 07/15/2021 9 (A) 14 - 44 % Final   • Monocytes Relative 07/15/2021 9  4 - 12 % Final   • Eosinophils Relative 07/15/2021 2  0 - 6 % Final   • Basophils Relative 07/15/2021 1  0 - 1 % Final   • Neutrophils Absolute 07/15/2021 6 83  1 85 - 7 62 Thousands/µL Final   • Immature Grans Absolute 07/15/2021 0 02  0 00 - 0 20 Thousand/uL Final   • Lymphocytes Absolute 07/15/2021 0 74  0 60 - 4 47 Thousands/µL Final   • Monocytes Absolute 07/15/2021 0 80  0 17 - 1 22 Thousand/µL Final   • Eosinophils Absolute 07/15/2021 0 13  0 00 - 0 61 Thousand/µL Final   • Basophils Absolute 07/15/2021 0 05  0 00 - 0 10 Thousands/µL Final   • Protime 07/15/2021 13 2  11 6 - 14 5 seconds Final   • INR 07/15/2021 1 01  0 84 - 1 19 Final   • PTT 07/15/2021 27  23 - 37 seconds Final    Therapeutic Heparin Range =  60-90 seconds   • Sodium 07/15/2021 139  136 - 145 mmol/L Final   • Potassium 07/15/2021 3 7  3 5 - 5 3 mmol/L Final   • Chloride 07/15/2021 103  100 - 108 mmol/L Final   • CO2 07/15/2021 28  21 - 32 mmol/L Final   • ANION GAP 07/15/2021 8  4 - 13 mmol/L Final   • BUN 07/15/2021 10  5 - 25 mg/dL Final   • Creatinine 07/15/2021 0 86  0 60 - 1 30 mg/dL Final    Standardized to IDMS reference method   • Glucose 07/15/2021 214 (A) 65 - 140 mg/dL Final    If the patient is fasting, the ADA then defines impaired fasting glucose as > 100 mg/dL and diabetes as > or equal to 123 mg/dL  Specimen collection should occur prior to Sulfasalazine administration due to the potential for falsely depressed results  Specimen collection should occur prior to Sulfapyridine administration due to the potential for falsely elevated results  • Calcium 07/15/2021 8 4  8 3 - 10 1 mg/dL Final   • Corrected Calcium 07/15/2021 9 0  8 3 - 10 1 mg/dL Final   • AST 07/15/2021 27  5 - 45 U/L Final    Specimen collection should occur prior to Sulfasalazine administration due to the potential for falsely depressed results  • ALT 07/15/2021 35  12 - 78 U/L Final    Specimen collection should occur prior to Sulfasalazine administration due to the potential for falsely depressed results      • Alkaline Phosphatase 07/15/2021 145 (A) 46 - 116 U/L Final   • Total Protein 07/15/2021 6 7  6 4 - 8 2 g/dL Final   • Albumin 07/15/2021 3 3 (A) 3 5 - 5 0 g/dL Final   • Total Bilirubin 07/15/2021 0 65  0 20 - 1 00 mg/dL Final    Use of this assay is not recommended for patients undergoing treatment with eltrombopag due to the potential for falsely elevated results  • eGFR 07/15/2021 69  ml/min/1 73sq m Final   • Lipase 07/15/2021 151  73 - 393 u/L Final   • Troponin I 07/15/2021 0 03  <=0 04 ng/mL Final    [employer] Chemistry analyzer 99% cutoff is > 0 04 ng/mL in network labs     o cTnI 99% cutoff is useful only when applied to patients in the clinical setting of myocardial ischemia   o cTnI 99% cutoff should be interpreted in the context of clinical history, ECG findings and possibly cardiac imaging to establish correct diagnosis  o cTnI 99% cutoff may be suggestive but clearly not indicative of a coronary event without the clinical setting of myocardial ischemia  • NT-proBNP 07/15/2021 1,364 (A) <125 pg/mL Final   • Blood Culture 07/15/2021 No Growth After 5 Days  Final   • Blood Culture 07/15/2021 No Growth After 5 Days  Final   • LACTIC ACID 07/15/2021 1 1  0 5 - 2 0 mmol/L Final   • Urine Culture 07/15/2021 60,000-69,000 cfu/ml    Final    Mixed Contaminants X5   • Troponin I 07/15/2021 0 03  <=0 04 ng/mL Final    [employer] Chemistry analyzer 99% cutoff is > 0 04 ng/mL in network labs     o cTnI 99% cutoff is useful only when applied to patients in the clinical setting of myocardial ischemia   o cTnI 99% cutoff should be interpreted in the context of clinical history, ECG findings and possibly cardiac imaging to establish correct diagnosis  o cTnI 99% cutoff may be suggestive but clearly not indicative of a coronary event without the clinical setting of myocardial ischemia  • SARS-CoV-2 07/16/2021 Negative  Negative Final   • Troponin I 07/16/2021 0 02  <=0 04 ng/mL Final    [employer] Chemistry analyzer 99% cutoff is > 0 04 ng/mL in network labs     o cTnI 99% cutoff is useful only when applied to patients in the clinical setting of myocardial ischemia   o cTnI 99% cutoff should be interpreted in the context of clinical history, ECG findings and possibly cardiac imaging to establish correct diagnosis     o cTnI 99% cutoff may be suggestive but clearly not indicative of a coronary event without the clinical setting of myocardial ischemia  • Cholesterol 07/16/2021 157  50 - 200 mg/dL Final    Cholesterol:       Desirable         <200 mg/dl       Borderline         200-239 mg/dl       High              >239          • Triglycerides 07/16/2021 96  <=150 mg/dL Final    Triglyceride:     Normal          <150 mg/dl     Borderline High 150-199 mg/dl     High            200-499 mg/dl        Very High       >499 mg/dl    Specimen collection should occur prior to N-Acetylcysteine or Metamizole administration due to the potential for falsely depressed results  • HDL, Direct 07/16/2021 39 (A) >=40 mg/dL Final    HDL Cholesterol:       Low     <41 mg/dL  Specimen collection should occur prior to Metamizole administration due to the potential for falsley depressed results  • LDL Calculated 07/16/2021 99  0 - 100 mg/dL Final    LDL Cholesterol:     Optimal           <100 mg/dl     Near Optimal      100-129 mg/dl     Above Optimal       Borderline High 130-159 mg/dl       High            160-189 mg/dl       Very High       >189 mg/dl         This screening LDL is a calculated result  It does not have the accuracy of the Direct Measured LDL in the monitoring of patients with hyperlipidemia and/or statin therapy  Direct Measure LDL (EBZ658) must be ordered separately in these patients  • Sodium 07/16/2021 143  136 - 145 mmol/L Final   • Potassium 07/16/2021 3 6  3 5 - 5 3 mmol/L Final   • Chloride 07/16/2021 108  100 - 108 mmol/L Final   • CO2 07/16/2021 27  21 - 32 mmol/L Final   • ANION GAP 07/16/2021 8  4 - 13 mmol/L Final   • BUN 07/16/2021 15  5 - 25 mg/dL Final   • Creatinine 07/16/2021 0 65  0 60 - 1 30 mg/dL Final    Standardized to IDMS reference method   • Glucose 07/16/2021 112  65 - 140 mg/dL Final    If the patient is fasting, the ADA then defines impaired fasting glucose as > 100 mg/dL and diabetes as > or equal to 123 mg/dL    Specimen collection should occur prior to Sulfasalazine administration due to the potential for falsely depressed results  Specimen collection should occur prior to Sulfapyridine administration due to the potential for falsely elevated results  • Glucose, Fasting 07/16/2021 112 (A) 65 - 99 mg/dL Final    Specimen collection should occur prior to Sulfasalazine administration due to the potential for falsely depressed results  Specimen collection should occur prior to Sulfapyridine administration due to the potential for falsely elevated results     • Calcium 07/16/2021 8 5  8 3 - 10 1 mg/dL Final   • eGFR 07/16/2021 91  ml/min/1 73sq m Final   • WBC 07/16/2021 5 37  4 31 - 10 16 Thousand/uL Final   • RBC 07/16/2021 4 20  3 81 - 5 12 Million/uL Final   • Hemoglobin 07/16/2021 11 0 (A) 11 5 - 15 4 g/dL Final   • Hematocrit 07/16/2021 36 1  34 8 - 46 1 % Final   • MCV 07/16/2021 86  82 - 98 fL Final   • MCH 07/16/2021 26 2 (A) 26 8 - 34 3 pg Final   • MCHC 07/16/2021 30 5 (A) 31 4 - 37 4 g/dL Final   • RDW 07/16/2021 15 0  11 6 - 15 1 % Final   • MPV 07/16/2021 9 4  8 9 - 12 7 fL Final   • Platelets 26/71/4120 173  149 - 390 Thousands/uL Final   • nRBC 07/16/2021 0  /100 WBCs Final   • Neutrophils Relative 07/16/2021 56  43 - 75 % Final   • Immat GRANS % 07/16/2021 0  0 - 2 % Final   • Lymphocytes Relative 07/16/2021 26  14 - 44 % Final   • Monocytes Relative 07/16/2021 13 (A) 4 - 12 % Final   • Eosinophils Relative 07/16/2021 4  0 - 6 % Final   • Basophils Relative 07/16/2021 1  0 - 1 % Final   • Neutrophils Absolute 07/16/2021 3 05  1 85 - 7 62 Thousands/µL Final   • Immature Grans Absolute 07/16/2021 0 01  0 00 - 0 20 Thousand/uL Final   • Lymphocytes Absolute 07/16/2021 1 39  0 60 - 4 47 Thousands/µL Final   • Monocytes Absolute 07/16/2021 0 67  0 17 - 1 22 Thousand/µL Final   • Eosinophils Absolute 07/16/2021 0 21  0 00 - 0 61 Thousand/µL Final   • Basophils Absolute 07/16/2021 0 04  0 00 - 0 10 Thousands/µL Final   • Hemoglobin A1C 07/16/2021 5 9 (A) Normal 3 8-5 6%; PreDiabetic 5 7-6 4%; Diabetic >=6 5%; Glycemic control for adults with diabetes <7 0% % Final   • EAG 07/16/2021 123  mg/dl Final   • Ventricular Rate 07/15/2021 66  BPM Final   • Atrial Rate 07/15/2021 66  BPM Final   • IA Interval 07/15/2021 166  ms Final   • QRSD Interval 07/15/2021 86  ms Final   • QT Interval 07/15/2021 392  ms Final   • QTC Interval 07/15/2021 410  ms Final   • P Axis 07/15/2021 5  degrees Final   • QRS Axis 07/15/2021 41  degrees Final   • T Wave Axis 07/15/2021 61  degrees Final         Patient Instructions     Heart Failure   AMBULATORY CARE:   Heart failure  is a condition that does not allow your heart to fill or pump properly  Not enough oxygen in your blood gets to your organs and tissues  Fluid may not move through your body properly  Fluid builds up and causes swelling and trouble breathing  This is known as congestive heart failure  Heart failure may start in the left or right ventricle  Heart failure is often caused by damage or injury to your heart  The damage may be caused by other heart problems, diabetes, or high blood pressure  The damage may have also been caused by an infection  Heart failure is a long-term condition that tends to get worse over time  It is important to manage your health to improve your quality of life         Common signs and symptoms:   · Trouble breathing with activity that worsens to trouble breathing at rest    · Shortness of breath while lying flat    · Severe shortness of breath and coughing at night that usually wakes you    · Feeling lightheaded when you stand up    · Purple color around your mouth and nails    · Confusion or anxiety    · Chest pain at night    · Periods of no breathing, then breathing fast    · Lack of energy (often worsened by physical activity), or trouble sleeping    · Swelling in your ankles, legs, or abdomen    · Heartbeat that is fast or not regular    · Fingers and toes feel cool to the touch    Call your local emergency number (911 in the [address], located within [facility] [facility]) if:   1  You have any of the following signs of a heart attack:      1  Squeezing, pressure, or pain in your chest    2  You may  also have any of the following:     § Discomfort or pain in your back, neck, jaw, stomach, or arm    § Shortness of breath    § Nausea or vomiting    § Lightheadedness or a sudden cold sweat      Call your doctor if:   1  Your heartbeat is fast, slow, or uneven all the time  2  You have symptoms of worsening heart failure:      ? Shortness of breath at rest, at night, or that is getting worse in any way    ? Weight gain of 3 or more pounds (1 4 kg) in a day, or more than your healthcare provider says is okay    ? More swelling in your legs or ankles    ? Abdominal pain or swelling    ? More coughing    ? Loss of appetite    ? Feeling tired all the time    3  You feel hopeless or depressed, or you have lost interest in things you used to enjoy  4  You often feel worried or afraid  5  You have questions or concerns about your condition or care  Treatment for heart failure  may include any of the following:  · Medicines  may be needed to help regulate your heart rhythm  You may also need medicines to lower your blood pressure, and to decrease extra fluids  · Oxygen  may help you breathe easier if your oxygen level is lower than normal  A CPAP machine may be used to keep your airway open while you sleep  · Cardiac rehab  is a program run by specialists who will help you safely strengthen your heart  In the program you will learn about exercise, relaxation, stress management, and heart-healthy nutrition  Cardiac rehab may be recommended if your heart failure is not severe  · Surgery  can be done to implant a pacemaker or another device in your chest to regulate your heart rhythm  Other types of surgery can open blocked heart vessels, replace a damaged heart valve, or remove scar tissue      Manage swelling from extra fluid:   · Elevate (raise) your legs above the level of your heart  This will help with fluid that builds up in your legs or ankles  Elevate your legs as often as possible during the day  Prop your legs on pillows or blankets to keep them elevated comfortably  Try not to stand for long periods of time during the day  Move around to keep your blood circulating  · Limit sodium (salt)  Ask how much sodium you can have each day  Your healthcare provider may give you a limit, such as 2,300 milligrams (mg) a day  Your provider or a dietitian can teach you how to read food labels for the number of mg in a food  He or she can also help you find ways to have less salt  For example, if you add salt to food as you cook, do not add more at the table  · Drink liquids as directed  You may need to limit the amount of liquid you drink within 24 hours  Your healthcare provider will tell you how much liquid to have and which liquids are best for you  He or she may tell you to limit liquid to 1 5 to 2 liters in a day  He or she will also tell you how often to drink liquid throughout the day  · Weigh yourself every morning  Use the same scale, in the same spot  Do this after you use the bathroom, but before you eat or drink  Wear the same type of clothing each time  Write down your weight and call your healthcare provider if you have a sudden weight gain  Swelling and weight gain are signs of fluid buildup  Manage heart failure: Your quality of life may improve with treatment and the following:  · Do not smoke  Nicotine and other chemicals in cigarettes and cigars can cause lung and heart damage  Ask your healthcare provider for information if you currently smoke and need help to quit  E-cigarettes or smokeless tobacco still contain nicotine  Talk to your healthcare provider before you use these products  · Do not drink alcohol or use illegal drugs    Alcohol and drugs can increase your risk for high blood pressure, diabetes, and coronary artery disease  · Eat heart-healthy foods  Heart-healthy foods include fruits, vegetables, lean meat (such as beef, chicken, or pork), and low-fat dairy products  Fatty fish such as salmon and tuna are also heart healthy  Other heart-healthy foods include walnuts, whole-grain breads, beans, and cooked beans  Replace butter and margarine with heart-healthy oils such as olive oil or canola oil  Your provider or a dietitian can help you create heart-healthy meal plans  · Manage any chronic health conditions you have  These include high blood pressure, diabetes, obesity, high cholesterol, metabolic syndrome, and COPD  You will have fewer symptoms if you manage these health conditions  Follow your healthcare provider's recommendations and follow up with him or her regularly  · Maintain a healthy weight  Being overweight can increase your risk for high blood pressure, diabetes, and coronary artery disease  These conditions can make your symptoms worse  Ask your healthcare provider how much you should weigh  Ask him or her to help you create a weight loss plan if you are overweight  · Stay active  Activity can help keep your symptoms from getting worse  Walking is a type of physical activity that helps maintain your strength and improve your mood  Physical activity also helps you manage your weight  Work with your healthcare provider to create an exercise plan that is right for you  · Get vaccines as directed  The flu and pneumonia can be severe for a person who has heart failure  Vaccines protect you from these infections  Get a flu shot every year as soon as it is recommended, usually in September or October  You may also need the pneumonia vaccine  Your healthcare provider can tell you if you need other vaccines, and when to get them  Follow up with your doctor or cardiologist within 7 days or as directed:   You may need to return for other tests  You may need home health care  A healthcare provider will monitor your vital signs, weight, and make sure your medicines are working  Write down your questions so you remember to ask them during your visits  Join a support group:  Heart failure can be difficult to manage  It may be helpful to talk with others who have heart failure  You may learn how to better manage your condition or get emotional support  For more information:  · [address]  [place] , [place]   Phone: 1- 358 - [phone]  Web Address: https://[employer]  [address] [facility] 2022 Information is for End User's use only and may not be sold, redistributed or otherwise used for commercial purposes  All illustrations and images included in CareNotes® are the copyrighted property of [employer]  or [facility] Torie Dia   The above information is an  only  It is not intended as medical advice for individual conditions or treatments  Talk to your doctor, nurse or pharmacist before following any medical regimen to see if it is safe and effective for you  Orval Medico        "This note has been constructed using a voice recognition system  Therefore there may be syntax, spelling, and/or grammatical errors   Please call if you have any questions  "

## 2022-11-07 NOTE — PATIENT INSTRUCTIONS
Heart Failure   AMBULATORY CARE:   Heart failure  is a condition that does not allow your heart to fill or pump properly  Not enough oxygen in your blood gets to your organs and tissues  Fluid may not move through your body properly  Fluid builds up and causes swelling and trouble breathing  This is known as congestive heart failure  Heart failure may start in the left or right ventricle  Heart failure is often caused by damage or injury to your heart  The damage may be caused by other heart problems, diabetes, or high blood pressure  The damage may have also been caused by an infection  Heart failure is a long-term condition that tends to get worse over time  It is important to manage your health to improve your quality of life  Common signs and symptoms:   Trouble breathing with activity that worsens to trouble breathing at rest    Shortness of breath while lying flat    Severe shortness of breath and coughing at night that usually wakes you    Feeling lightheaded when you stand up    Purple color around your mouth and nails    Confusion or anxiety    Chest pain at night    Periods of no breathing, then breathing fast    Lack of energy (often worsened by physical activity), or trouble sleeping    Swelling in your ankles, legs, or abdomen    Heartbeat that is fast or not regular    Fingers and toes feel cool to the touch    Call your local emergency number (911 in the [address]) if:   You have any of the following signs of a heart attack:      Squeezing, pressure, or pain in your chest    You may  also have any of the following:     Discomfort or pain in your back, neck, jaw, stomach, or arm    Shortness of breath    Nausea or vomiting    Lightheadedness or a sudden cold sweat      Call your doctor if:   Your heartbeat is fast, slow, or uneven all the time      You have symptoms of worsening heart failure:      Shortness of breath at rest, at night, or that is getting worse in any way    Weight gain of 3 or more pounds (1 4 kg) in a day, or more than your healthcare provider says is okay    More swelling in your legs or ankles    Abdominal pain or swelling    More coughing    Loss of appetite    Feeling tired all the time    You feel hopeless or depressed, or you have lost interest in things you used to enjoy  You often feel worried or afraid  You have questions or concerns about your condition or care  Treatment for heart failure  may include any of the following:  Medicines  may be needed to help regulate your heart rhythm  You may also need medicines to lower your blood pressure, and to decrease extra fluids  Oxygen  may help you breathe easier if your oxygen level is lower than normal  A CPAP machine may be used to keep your airway open while you sleep  Cardiac rehab  is a program run by specialists who will help you safely strengthen your heart  In the program you will learn about exercise, relaxation, stress management, and heart-healthy nutrition  Cardiac rehab may be recommended if your heart failure is not severe  Surgery  can be done to implant a pacemaker or another device in your chest to regulate your heart rhythm  Other types of surgery can open blocked heart vessels, replace a damaged heart valve, or remove scar tissue  Manage swelling from extra fluid:   Elevate (raise) your legs above the level of your heart  This will help with fluid that builds up in your legs or ankles  Elevate your legs as often as possible during the day  Prop your legs on pillows or blankets to keep them elevated comfortably  Try not to stand for long periods of time during the day  Move around to keep your blood circulating  Limit sodium (salt)  Ask how much sodium you can have each day  Your healthcare provider may give you a limit, such as 2,300 milligrams (mg) a day  Your provider or a dietitian can teach you how to read food labels for the number of mg in a food   He or she can also help you find ways to have less salt  For example, if you add salt to food as you cook, do not add more at the table  Drink liquids as directed  You may need to limit the amount of liquid you drink within 24 hours  Your healthcare provider will tell you how much liquid to have and which liquids are best for you  He or she may tell you to limit liquid to 1 5 to 2 liters in a day  He or she will also tell you how often to drink liquid throughout the day  Weigh yourself every morning  Use the same scale, in the same spot  Do this after you use the bathroom, but before you eat or drink  Wear the same type of clothing each time  Write down your weight and call your healthcare provider if you have a sudden weight gain  Swelling and weight gain are signs of fluid buildup  Manage heart failure: Your quality of life may improve with treatment and the following:  Do not smoke  Nicotine and other chemicals in cigarettes and cigars can cause lung and heart damage  Ask your healthcare provider for information if you currently smoke and need help to quit  E-cigarettes or smokeless tobacco still contain nicotine  Talk to your healthcare provider before you use these products  Do not drink alcohol or use illegal drugs  Alcohol and drugs can increase your risk for high blood pressure, diabetes, and coronary artery disease  Eat heart-healthy foods  Heart-healthy foods include fruits, vegetables, lean meat (such as beef, chicken, or pork), and low-fat dairy products  Fatty fish such as salmon and tuna are also heart healthy  Other heart-healthy foods include walnuts, whole-grain breads, beans, and cooked beans  Replace butter and margarine with heart-healthy oils such as olive oil or canola oil  Your provider or a dietitian can help you create heart-healthy meal plans  Manage any chronic health conditions you have    These include high blood pressure, diabetes, obesity, high cholesterol, metabolic syndrome, and COPD  You will have fewer symptoms if you manage these health conditions  Follow your healthcare provider's recommendations and follow up with him or her regularly  Maintain a healthy weight  Being overweight can increase your risk for high blood pressure, diabetes, and coronary artery disease  These conditions can make your symptoms worse  Ask your healthcare provider how much you should weigh  Ask him or her to help you create a weight loss plan if you are overweight  Stay active  Activity can help keep your symptoms from getting worse  Walking is a type of physical activity that helps maintain your strength and improve your mood  Physical activity also helps you manage your weight  Work with your healthcare provider to create an exercise plan that is right for you  Get vaccines as directed  The flu and pneumonia can be severe for a person who has heart failure  Vaccines protect you from these infections  Get a flu shot every year as soon as it is recommended, usually in September or October  You may also need the pneumonia vaccine  Your healthcare provider can tell you if you need other vaccines, and when to get them  Follow up with your doctor or cardiologist within 7 days or as directed: You may need to return for other tests  You may need home health care  A healthcare provider will monitor your vital signs, weight, and make sure your medicines are working  Write down your questions so you remember to ask them during your visits  Join a support group:  Heart failure can be difficult to manage  It may be helpful to talk with others who have heart failure  You may learn how to better manage your condition or get emotional support  For more information:  [address] [place] [address] [facility]   Phone: 3- 313 - [phone]  Web Address: https://www strong [employer]/  [address]22 Information is for End User's use only and may not be sold, redistributed or otherwise used for commercial purposes  All illustrations and images included in CareNotes® are the copyrighted property of [employer]  or Antonella Stinson  The above information is an  only  It is not intended as medical advice for individual conditions or treatments  Talk to your doctor, nurse or pharmacist before following any medical regimen to see if it is safe and effective for you

## 2022-11-07 NOTE — ASSESSMENT & PLAN NOTE
Blood pressure control with low-salt diet and amlodipine losartan and metoprolol continue current treatment

## 2022-11-12 PROBLEM — F33.42 RECURRENT MAJOR DEPRESSIVE DISORDER, IN FULL REMISSION (HCC): Status: ACTIVE | Noted: 2022-11-12

## 2022-11-12 NOTE — ASSESSMENT & PLAN NOTE
Counseling done to lose weight low-fat low-cholesterol diet along with cut down calories cut down the carbohydrates change lifestyle increase activity

## 2022-11-12 NOTE — ASSESSMENT & PLAN NOTE
Patient's pain is controlled for now taking ibuprofen over-the-counter as needed and muscle muscle relaxant at bedtime no change since the last visit stable

## 2023-01-27 DIAGNOSIS — F32.4 MAJOR DEPRESSIVE DISORDER WITH SINGLE EPISODE, IN PARTIAL REMISSION (HCC): ICD-10-CM

## 2023-01-27 DIAGNOSIS — I10 PRIMARY HYPERTENSION: Primary | ICD-10-CM

## 2023-01-27 RX ORDER — CITALOPRAM 40 MG/1
TABLET ORAL
Qty: 90 TABLET | Refills: 0 | Status: SHIPPED | OUTPATIENT
Start: 2023-01-27

## 2023-01-27 RX ORDER — LOSARTAN POTASSIUM 100 MG/1
TABLET ORAL
Qty: 90 TABLET | Refills: 0 | Status: SHIPPED | OUTPATIENT
Start: 2023-01-27

## 2023-01-27 RX ORDER — DULOXETIN HYDROCHLORIDE 30 MG/1
CAPSULE, DELAYED RELEASE ORAL
Qty: 90 CAPSULE | Refills: 0 | Status: SHIPPED | OUTPATIENT
Start: 2023-01-27

## 2023-03-30 RX ORDER — DESOXIMETASONE 2.5 MG/G
CREAM TOPICAL
COMMUNITY
End: 2023-08-01

## 2023-03-30 RX ORDER — LOSARTAN POTASSIUM 100 MG/1
TABLET ORAL DAILY
COMMUNITY
End: 2023-03-31 | Stop reason: SDUPTHER

## 2023-03-30 RX ORDER — FUROSEMIDE 40 MG/1
TABLET ORAL DAILY
COMMUNITY
End: 2023-03-31

## 2023-03-30 RX ORDER — FUROSEMIDE 20 MG/1
TABLET ORAL DAILY
COMMUNITY
End: 2023-03-31

## 2023-03-30 RX ORDER — AMLODIPINE BESYLATE 5 MG/1
TABLET ORAL DAILY
COMMUNITY
End: 2023-03-31

## 2023-03-30 RX ORDER — AMLODIPINE BESYLATE 10 MG/1
TABLET ORAL
COMMUNITY
End: 2023-03-31 | Stop reason: SDUPTHER

## 2023-03-30 RX ORDER — CITALOPRAM 40 MG/1
TABLET ORAL DAILY
COMMUNITY
End: 2023-03-31 | Stop reason: SDUPTHER

## 2023-03-30 RX ORDER — HYDROXYZINE HYDROCHLORIDE 25 MG/1
TABLET, FILM COATED ORAL EVERY 12 HOURS
COMMUNITY
End: 2023-03-31

## 2023-03-30 RX ORDER — METOPROLOL TARTRATE 100 MG/1
TABLET ORAL EVERY 12 HOURS
COMMUNITY
End: 2023-03-31

## 2023-03-30 RX ORDER — DULOXETIN HYDROCHLORIDE 30 MG/1
CAPSULE, DELAYED RELEASE ORAL DAILY
COMMUNITY
End: 2023-03-31 | Stop reason: SDUPTHER

## 2023-03-30 RX ORDER — ALBUTEROL SULFATE 90 UG/1
POWDER, METERED RESPIRATORY (INHALATION) EVERY 4 HOURS
COMMUNITY

## 2023-03-30 RX ORDER — LISINOPRIL 10 MG/1
TABLET ORAL DAILY
COMMUNITY
End: 2023-03-31

## 2023-03-30 RX ORDER — CITALOPRAM 20 MG/1
TABLET ORAL
COMMUNITY
End: 2023-03-31

## 2023-03-30 RX ORDER — ALBUTEROL SULFATE 90 UG/1
AEROSOL, METERED RESPIRATORY (INHALATION) EVERY 4 HOURS
COMMUNITY
End: 2023-03-31 | Stop reason: SDUPTHER

## 2023-03-30 RX ORDER — CYCLOBENZAPRINE HCL 10 MG
TABLET ORAL
COMMUNITY
End: 2023-03-31

## 2023-03-30 RX ORDER — ERGOCALCIFEROL 1.25 MG/1
CAPSULE ORAL
COMMUNITY
End: 2023-03-31

## 2023-03-30 RX ORDER — FERROUS SULFATE 325(65) MG
TABLET ORAL 3 TIMES DAILY
COMMUNITY
End: 2023-03-31

## 2023-03-31 ENCOUNTER — OFFICE VISIT (OUTPATIENT)
Dept: FAMILY MEDICINE CLINIC | Facility: CLINIC | Age: 73
End: 2023-03-31

## 2023-03-31 VITALS
BODY MASS INDEX: 42.92 KG/M2 | WEIGHT: 267.06 LBS | TEMPERATURE: 97.7 F | RESPIRATION RATE: 21 BRPM | DIASTOLIC BLOOD PRESSURE: 80 MMHG | SYSTOLIC BLOOD PRESSURE: 152 MMHG | HEIGHT: 66 IN | OXYGEN SATURATION: 98 % | HEART RATE: 65 BPM

## 2023-03-31 DIAGNOSIS — J45.909 REACTIVE AIRWAY DISEASE WITHOUT COMPLICATION, UNSPECIFIED ASTHMA SEVERITY, UNSPECIFIED WHETHER PERSISTENT: ICD-10-CM

## 2023-03-31 DIAGNOSIS — E66.01 MORBID OBESITY WITH BMI OF 40.0-44.9, ADULT (HCC): ICD-10-CM

## 2023-03-31 DIAGNOSIS — Z86.2 HISTORY OF ANEMIA: ICD-10-CM

## 2023-03-31 DIAGNOSIS — Z11.59 NEED FOR HEPATITIS C SCREENING TEST: ICD-10-CM

## 2023-03-31 DIAGNOSIS — G89.4 CHRONIC PAIN SYNDROME: ICD-10-CM

## 2023-03-31 DIAGNOSIS — F41.9 ANXIETY AND DEPRESSION: ICD-10-CM

## 2023-03-31 DIAGNOSIS — Z78.0 ENCOUNTER FOR OSTEOPOROSIS SCREENING IN ASYMPTOMATIC POSTMENOPAUSAL PATIENT: ICD-10-CM

## 2023-03-31 DIAGNOSIS — F32.A ANXIETY AND DEPRESSION: ICD-10-CM

## 2023-03-31 DIAGNOSIS — M79.89 LEG SWELLING: ICD-10-CM

## 2023-03-31 DIAGNOSIS — I63.40 CEREBROVASCULAR ACCIDENT (CVA) DUE TO EMBOLISM OF CEREBRAL ARTERY (HCC): ICD-10-CM

## 2023-03-31 DIAGNOSIS — R06.09 DYSPNEA ON EXERTION: ICD-10-CM

## 2023-03-31 DIAGNOSIS — Z13.820 ENCOUNTER FOR OSTEOPOROSIS SCREENING IN ASYMPTOMATIC POSTMENOPAUSAL PATIENT: ICD-10-CM

## 2023-03-31 DIAGNOSIS — I50.32 CHRONIC DIASTOLIC CHF (CONGESTIVE HEART FAILURE) (HCC): ICD-10-CM

## 2023-03-31 DIAGNOSIS — I10 ESSENTIAL HYPERTENSION: Primary | ICD-10-CM

## 2023-03-31 DIAGNOSIS — R73.03 PRE-DIABETES: ICD-10-CM

## 2023-03-31 DIAGNOSIS — Z12.31 ENCOUNTER FOR SCREENING MAMMOGRAM FOR BREAST CANCER: ICD-10-CM

## 2023-03-31 DIAGNOSIS — L84 CALLUS OF FOOT: ICD-10-CM

## 2023-03-31 DIAGNOSIS — F33.42 RECURRENT MAJOR DEPRESSIVE DISORDER, IN FULL REMISSION (HCC): ICD-10-CM

## 2023-03-31 RX ORDER — DULOXETIN HYDROCHLORIDE 30 MG/1
30 CAPSULE, DELAYED RELEASE ORAL DAILY
Qty: 90 CAPSULE | Refills: 0 | Status: SHIPPED | OUTPATIENT
Start: 2023-03-31

## 2023-03-31 RX ORDER — AMLODIPINE BESYLATE 10 MG/1
10 TABLET ORAL DAILY
Qty: 90 TABLET | Refills: 1 | Status: SHIPPED | OUTPATIENT
Start: 2023-03-31

## 2023-03-31 RX ORDER — FUROSEMIDE 20 MG/1
20 TABLET ORAL DAILY
Qty: 90 TABLET | Refills: 1 | Status: SHIPPED | OUTPATIENT
Start: 2023-03-31

## 2023-03-31 RX ORDER — CITALOPRAM 40 MG/1
40 TABLET ORAL DAILY
Qty: 90 TABLET | Refills: 1 | Status: SHIPPED | OUTPATIENT
Start: 2023-03-31

## 2023-03-31 RX ORDER — METOPROLOL TARTRATE 100 MG/1
100 TABLET ORAL 2 TIMES DAILY
Qty: 180 TABLET | Refills: 0 | Status: SHIPPED | OUTPATIENT
Start: 2023-03-31

## 2023-03-31 RX ORDER — LOSARTAN POTASSIUM 100 MG/1
100 TABLET ORAL DAILY
Qty: 90 TABLET | Refills: 1 | Status: SHIPPED | OUTPATIENT
Start: 2023-03-31

## 2023-03-31 RX ORDER — ALBUTEROL SULFATE 90 UG/1
2 AEROSOL, METERED RESPIRATORY (INHALATION) EVERY 4 HOURS PRN
Qty: 18 G | Refills: 2 | Status: SHIPPED | OUTPATIENT
Start: 2023-03-31

## 2023-03-31 RX ORDER — CYCLOBENZAPRINE HCL 10 MG
10 TABLET ORAL
Qty: 90 TABLET | Refills: 0 | Status: SHIPPED | OUTPATIENT
Start: 2023-03-31

## 2023-03-31 NOTE — ASSESSMENT & PLAN NOTE
Pressure borderline today at 152/80  With history of CVA goal is less than 140/90  Home medications include: Amlodipine 10 mg, losartan 100 mg, metoprolol 100 mg twice daily  Patient admits to be compliant with medications  Patient's blood pressure is controlled  Patient denies any side effects with medications  - Patient educated on the importance of weight loss, and appropriate dieting (low salt)     - Continue current medications and monitor closely to best optimize blood pressure

## 2023-03-31 NOTE — ASSESSMENT & PLAN NOTE
BMI Counseling: Body mass index is 43 11 kg/m²  The BMI is above normal  Nutrition recommendations include reducing portion sizes, decreasing overall calorie intake, 3-5 servings of fruits/vegetables daily, reducing fast food intake, moderation in carbohydrate intake, increasing intake of lean protein, reducing intake of saturated fat and trans fat and reducing intake of cholesterol  Exercise recommendations include moderate aerobic physical activity for 150 minutes/week, exercising 3-5 times per week and strength training exercises

## 2023-03-31 NOTE — PROGRESS NOTES
Name: Chelsea Fenton      : 1950      MRN: [id number]  Encounter Provider: Angelique Last MD  Encounter Date: 3/31/2023   Encounter department: [facility]     1  Essential hypertension  Assessment & Plan:  Pressure borderline today at 152/80  With history of CVA goal is less than 140/90  Home medications include: Amlodipine 10 mg, losartan 100 mg, metoprolol 100 mg twice daily  Patient admits to be compliant with medications  Patient's blood pressure is controlled  Patient denies any side effects with medications  - Patient educated on the importance of weight loss, and appropriate dieting (low salt)  - Continue current medications and monitor closely to best optimize blood pressure    Orders:  -     amLODIPine (NORVASC) 10 mg tablet; Take 1 tablet (10 mg total) by mouth daily  -     losartan (COZAAR) 100 MG tablet; Take 1 tablet (100 mg total) by mouth daily  -     metoprolol tartrate (LOPRESSOR) 100 mg tablet; Take 1 tablet (100 mg total) by mouth 2 (two) times a day  -     Basic metabolic panel; Future  -     Microalbumin / creatinine urine ratio    2  Dyspnea on exertion  Comments:  Denies any orthopnea or paroxysmal nocturnal dyspnea  Does have history of diastolic heart failure  Follow-up echo  Orders:  -     Echo complete w/ contrast if indicated; Future; Expected date: 2023    3  Chronic diastolic CHF (congestive heart failure) (HCC)  Assessment & Plan:  Wt Readings from Last 3 Encounters:   23 121 kg (267 lb 1 oz)   22 122 kg (268 lb)   22 121 kg (266 lb)     Bilateral pitting edema, does take Lasix daily however ran out and requesting refill  Lung exam benign  Given chronic history of hypertension, and dyspnea on exertion we will follow-up echocardiogram    Orders:  -     Echo complete w/ contrast if indicated; Future; Expected date: 2023    4   Recurrent major depressive disorder, in full remission [facility])  Comments:  Refill requested, also provided patient with outpatient resources for therapy    5  Morbid obesity with BMI of 40 0-44 9, adult [facility])  Assessment & Plan:  BMI Counseling: Body mass index is 43 11 kg/m²  The BMI is above normal  Nutrition recommendations include reducing portion sizes, decreasing overall calorie intake, 3-5 servings of fruits/vegetables daily, reducing fast food intake, moderation in carbohydrate intake, increasing intake of lean protein, reducing intake of saturated fat and trans fat and reducing intake of cholesterol  Exercise recommendations include moderate aerobic physical activity for 150 minutes/week, exercising 3-5 times per week and strength training exercises  6  Leg swelling  -     furosemide (LASIX) 20 mg tablet; Take 1 tablet (20 mg total) by mouth daily As needed    7  Chronic pain syndrome  -     cyclobenzaprine (FLEXERIL) 10 mg tablet; Take 1 tablet (10 mg total) by mouth daily at bedtime  -     DULoxetine (Cymbalta) 30 mg delayed release capsule; Take 1 capsule (30 mg total) by mouth daily    8  Anxiety and depression  -     citalopram (CeleXA) 40 mg tablet; Take 1 tablet (40 mg total) by mouth daily    9  Reactive airway disease without complication, unspecified asthma severity, unspecified whether persistent  -     albuterol (Ventolin HFA) 90 mcg/act inhaler; Inhale 2 puffs every 4 (four) hours as needed for wheezing or shortness of breath    10  History of anemia  Assessment & Plan:  Hemoglobin 11 in 2021  Repeat CBC, iron panel, folate, B12    Orders:  -     Iron Panel (Includes Ferritin, Iron Sat%, Iron, and TIBC); Future  -     CBC and Platelet; Future  -     Vitamin B12; Future  -     Folate; Future    11  Pre-diabetes  Assessment & Plan:  Prediabetes in 2021  Repeat HbA1c    Orders:  -     HEMOGLOBIN A1C W/ EAG ESTIMATION; Future    12   Cerebrovascular accident (CVA) due to embolism of cerebral artery (HCC)  Comments:  Not on any lipid-lowering agent, follow-up lipid profile  Orders:  -     Lipid Panel with Direct LDL reflex; Future    13  Need for hepatitis C screening test  -     Hepatitis C Antibody ([facility]); Future    14  Encounter for screening mammogram for breast cancer  -     Mammo screening bilateral w 3d & cad; Future; Expected date: 03/31/2023    15  Encounter for osteoporosis screening in asymptomatic postmenopausal patient  -     DXA bone density spine hip and pelvis; Future; Expected date: 03/31/2023    16  Callus of foot  Comments:  requested referral to podiatry  Orders:  -     Ambulatory Referral to Podiatry; Future         Subjective      This is a very pleasant 81 y o  female who presents to the clinic to establish care as a new patient  Requires refills on most of her medications  She has no acute concerns or questions at this time  She believes she received her pneumonia vaccine at [facility] in October 2022, and she will ask them for which vaccine she did receive  She is also up-to-date with her COVID-vaccine and boosters that she received at [address] as well  Patient declines influenza today  Patient's medical conditions are stable unless noted otherwise above  Patient has not had any recent hospitalizations, or medical emergencies since last visit  Patient has no further complaints other than what is mentioned in the ROS  Review of Systems   Constitutional: Negative for activity change, appetite change, chills, fatigue and fever  HENT: Negative for congestion, rhinorrhea, sinus pain and sore throat  Eyes: Negative for visual disturbance  Respiratory: Positive for shortness of breath ( on extertion)  Negative for cough, chest tightness and wheezing  Cardiovascular: Positive for leg swelling  Negative for chest pain and palpitations  Gastrointestinal: Negative for abdominal pain, constipation, diarrhea, nausea and vomiting  Genitourinary: Negative for difficulty urinating and frequency  Musculoskeletal: Positive for back pain ( chronic, spinal stenosis) and gait problem (ambulates with cane)  Negative for arthralgias, myalgias and neck pain  Skin: Negative for rash  Allergic/Immunologic: Negative  Neurological: Negative for dizziness, weakness, light-headedness, numbness and headaches  Psychiatric/Behavioral: Negative for behavioral problems and sleep disturbance  Current Outpatient Medications on File Prior to Visit   Medication Sig   • Albuterol Sulfate (ProAir RespiClick) 184 (90 Base) MCG/ACT AEPB every 4 (four) hours   • desoximetasone (Topicort) 0 25 % cream Topicort 0 25 % topical cream   APPLY A THIN LAYER TO THE AFFECTED AREA(S) OF THE CHEST BY TOPICAL ROUTE 2 TIMES PER DAY ; RUB IN GENTLY AND COMPLETELY   • [DISCONTINUED] albuterol (Ventolin HFA) 90 mcg/act inhaler every 4 (four) hours   • [DISCONTINUED] amLODIPine (NORVASC) 10 mg tablet amlodipine 10 mg tablet   Take 1 tablet by mouth once daily   • [DISCONTINUED] amLODIPine (NORVASC) 5 mg tablet Take 1 tablet (5 mg total) by mouth daily   • [DISCONTINUED] amLODIPine (NORVASC) 5 mg tablet Daily   • [DISCONTINUED] citalopram (CeleXA) 20 mg tablet Take 1 tablet(s) every day by oral route     • [DISCONTINUED] citalopram (CeleXA) 40 mg tablet Take 1 tablet by mouth once daily   • [DISCONTINUED] citalopram (CeleXA) 40 mg tablet Daily   • [DISCONTINUED] cyclobenzaprine (FLEXERIL) 10 mg tablet Take 1 tablet (10 mg total) by mouth daily at bedtime   • [DISCONTINUED] cyclobenzaprine (FLEXERIL) 10 mg tablet cyclobenzaprine 10 mg tablet   TAKE 1 TABLET BY MOUTH ONCE DAILY AT BEDTIME   • [DISCONTINUED] DULoxetine (CYMBALTA) 30 mg delayed release capsule Take 1 capsule by mouth once daily   • [DISCONTINUED] DULoxetine (Cymbalta) 30 mg delayed release capsule Daily   • [DISCONTINUED] ergocalciferol (ERGOCALCIFEROL) 1 25 MG (44155 UT) capsule Take 50,000 Units by mouth once a week   • [DISCONTINUED] ergocalciferol (ERGOCALCIFEROL) 1 25 "MG (37841 UT) capsule Vitamin D2 1,250 mcg (50,000 unit) capsule   Take 1 capsule every week by oral route  • [DISCONTINUED] ferrous sulfate 325 (65 Fe) mg tablet 3 (three) times a day   • [DISCONTINUED] furosemide (LASIX) 20 mg tablet Take 2 tablets (40 mg total) by mouth daily As needed   • [DISCONTINUED] furosemide (Lasix) 20 mg tablet Daily   • [DISCONTINUED] furosemide (Lasix) 40 mg tablet Daily   • [DISCONTINUED] hydrOXYzine HCL (ATARAX) 25 mg tablet Every 12 hours   • [DISCONTINUED] lisinopril (ZESTRIL) 10 mg tablet Daily   • [DISCONTINUED] losartan (COZAAR) 100 MG tablet TAKE 1 TABLET BY MOUTH ONCE DAILY IN THE EVENING   • [DISCONTINUED] losartan (COZAAR) 100 MG tablet Daily   • [DISCONTINUED] losartan (COZAAR) 25 mg tablet Take 4 tablets (100 mg total) by mouth daily at bedtime   • [DISCONTINUED] metoprolol tartrate (LOPRESSOR) 100 mg tablet Take 1 tablet (100 mg total) by mouth 2 (two) times a day   • [DISCONTINUED] metoprolol tartrate (LOPRESSOR) 100 mg tablet Every 12 hours     Objective     /80 (BP Location: Left arm, Patient Position: Sitting, Cuff Size: Large)   Pulse 65   Temp 97 7 °F (36 5 °C) (Temporal)   Resp 21   Ht 5' 6\" (1 676 m)   Wt 121 kg (267 lb 1 oz)   SpO2 98%   BMI 43 11 kg/m²     Physical Exam  Vitals and nursing note reviewed  Constitutional:       General: She is not in acute distress  Appearance: She is well-developed  She is obese  She is not ill-appearing  HENT:      Head: Normocephalic and atraumatic  Right Ear: External ear normal       Left Ear: External ear normal       Nose: Nose normal    Eyes:      Extraocular Movements: Extraocular movements intact  Cardiovascular:      Rate and Rhythm: Normal rate and regular rhythm  Pulses: Normal pulses  Heart sounds: Normal heart sounds  No murmur heard  Pulmonary:      Effort: Pulmonary effort is normal  No respiratory distress  Breath sounds: Normal breath sounds  No wheezing or rales   " Musculoskeletal:         General: No tenderness  Normal range of motion  Cervical back: Normal range of motion and neck supple  Right lower leg: Edema present  Left lower leg: Edema present  Comments: Bilateral pitting edema to just below knee   Skin:     General: Skin is warm and dry  Capillary Refill: Capillary refill takes less than 2 seconds  Findings: No bruising or rash  Neurological:      Mental Status: She is alert and oriented to person, place, and time  Gait: Gait abnormal ( Ambulates with cane)     Psychiatric:         Mood and Affect: Mood normal          Behavior: Behavior normal           Norris Moon MD

## 2023-03-31 NOTE — ASSESSMENT & PLAN NOTE
Wt Readings from Last 3 Encounters:   03/31/23 121 kg (267 lb 1 oz)   11/07/22 122 kg (268 lb)   09/06/22 121 kg (266 lb)     Bilateral pitting edema, does take Lasix daily however ran out and requesting refill  Lung exam benign   Given chronic history of hypertension, and dyspnea on exertion we will follow-up echocardiogram

## 2023-05-16 ENCOUNTER — APPOINTMENT (OUTPATIENT)
Dept: LAB | Facility: CLINIC | Age: 73
End: 2023-05-16

## 2023-05-16 ENCOUNTER — OFFICE VISIT (OUTPATIENT)
Dept: FAMILY MEDICINE CLINIC | Facility: CLINIC | Age: 73
End: 2023-05-16

## 2023-05-16 VITALS
HEIGHT: 66 IN | TEMPERATURE: 97.5 F | RESPIRATION RATE: 17 BRPM | BODY MASS INDEX: 42.77 KG/M2 | SYSTOLIC BLOOD PRESSURE: 126 MMHG | HEART RATE: 72 BPM | DIASTOLIC BLOOD PRESSURE: 80 MMHG | WEIGHT: 266.1 LBS | OXYGEN SATURATION: 96 %

## 2023-05-16 DIAGNOSIS — I63.40 CEREBROVASCULAR ACCIDENT (CVA) DUE TO EMBOLISM OF CEREBRAL ARTERY (HCC): ICD-10-CM

## 2023-05-16 DIAGNOSIS — Z11.59 NEED FOR HEPATITIS C SCREENING TEST: ICD-10-CM

## 2023-05-16 DIAGNOSIS — D50.9 IRON DEFICIENCY ANEMIA, UNSPECIFIED IRON DEFICIENCY ANEMIA TYPE: ICD-10-CM

## 2023-05-16 DIAGNOSIS — R73.03 PRE-DIABETES: ICD-10-CM

## 2023-05-16 DIAGNOSIS — I10 ESSENTIAL HYPERTENSION: Primary | ICD-10-CM

## 2023-05-16 DIAGNOSIS — R53.83 OTHER FATIGUE: ICD-10-CM

## 2023-05-16 DIAGNOSIS — Z86.2 HISTORY OF ANEMIA: ICD-10-CM
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
## 2023-05-16 DIAGNOSIS — I10 ESSENTIAL HYPERTENSION: ICD-10-CM

## 2023-05-16 LAB
ANION GAP SERPL CALCULATED.3IONS-SCNC: 0 MMOL/L (ref 4–13)
BASOPHILS # BLD AUTO: 0.07 THOUSANDS/ÂΜL (ref 0–0.1)
BASOPHILS NFR BLD AUTO: 1 % (ref 0–1)
BUN SERPL-MCNC: 11 MG/DL (ref 5–25)
CALCIUM SERPL-MCNC: 8.7 MG/DL (ref 8.3–10.1)
CHLORIDE SERPL-SCNC: 110 MMOL/L (ref 96–108)
CO2 SERPL-SCNC: 28 MMOL/L (ref 21–32)
CREAT SERPL-MCNC: 0.64 MG/DL (ref 0.6–1.3)
EOSINOPHIL # BLD AUTO: 0.2 THOUSAND/ÂΜL (ref 0–0.61)
EOSINOPHIL NFR BLD AUTO: 3 % (ref 0–6)
ERYTHROCYTE [DISTWIDTH] IN BLOOD BY AUTOMATED COUNT: 16.6 % (ref 11.6–15.1)
EST. AVERAGE GLUCOSE BLD GHB EST-MCNC: 134 MG/DL
FERRITIN SERPL-MCNC: 5 NG/ML (ref 11–307)
FOLATE SERPL-MCNC: 12 NG/ML
GFR SERPL CREATININE-BSD FRML MDRD: 90 ML/MIN/1.73SQ M
GLUCOSE P FAST SERPL-MCNC: 103 MG/DL (ref 65–99)
HBA1C MFR BLD: 6.3 %
HCT VFR BLD AUTO: 34.8 % (ref 34.8–46.1)
HCV AB SER QL: NORMAL
HGB BLD-MCNC: 10.4 G/DL (ref 11.5–15.4)
IMM GRANULOCYTES # BLD AUTO: 0.02 THOUSAND/UL (ref 0–0.2)
IMM GRANULOCYTES NFR BLD AUTO: 0 % (ref 0–2)
IRON SATN MFR SERPL: 5 % (ref 15–50)
IRON SERPL-MCNC: 25 UG/DL (ref 50–170)
LYMPHOCYTES # BLD AUTO: 1.12 THOUSANDS/ÂΜL (ref 0.6–4.47)
LYMPHOCYTES NFR BLD AUTO: 19 % (ref 14–44)
MCH RBC QN AUTO: 22.8 PG (ref 26.8–34.3)
MCHC RBC AUTO-ENTMCNC: 29.9 G/DL (ref 31.4–37.4)
MCV RBC AUTO: 76 FL (ref 82–98)
MONOCYTES # BLD AUTO: 0.47 THOUSAND/ÂΜL (ref 0.17–1.22)
MONOCYTES NFR BLD AUTO: 8 % (ref 4–12)
NEUTROPHILS # BLD AUTO: 4.11 THOUSANDS/ÂΜL (ref 1.85–7.62)
NEUTS SEG NFR BLD AUTO: 69 % (ref 43–75)
NRBC BLD AUTO-RTO: 0 /100 WBCS
PLATELET # BLD AUTO: 275 THOUSANDS/UL (ref 149–390)
PMV BLD AUTO: 10.4 FL (ref 8.9–12.7)
POTASSIUM SERPL-SCNC: 4.5 MMOL/L (ref 3.5–5.3)
RBC # BLD AUTO: 4.57 MILLION/UL (ref 3.81–5.12)
SODIUM SERPL-SCNC: 138 MMOL/L (ref 135–147)
TIBC SERPL-MCNC: 516 UG/DL (ref 250–450)
TSH SERPL DL<=0.05 MIU/L-ACNC: 1.94 UIU/ML (ref 0.45–4.5)
VIT B12 SERPL-MCNC: 1076 PG/ML (ref 180–914)
WBC # BLD AUTO: 5.99 THOUSAND/UL (ref 4.31–10.16)

## 2023-05-16 NOTE — PROGRESS NOTES
Name: Ana Ryan      : 1950      MRN: [id number]  Encounter Provider: Tameka Vasquez MD  Encounter Date: 2023   Encounter department: [facility]     1  Essential hypertension  Assessment & Plan: With history of CVA goal is less than 140/90  At goal today with 126/80  Home medications include: Amlodipine 10 mg, losartan 100 mg, metoprolol 100 mg twice daily  However has not been taking amldopine for past 3-4 weeks as daughter picked up medications, and they did not have this refill for her  Endorses her leg swelling has greatly improved during this time  Suspect her bilateral lower leg edema was attributed to amlodipine, given blood pressure well controlled at this time  - Discontinue amlodipine from medication list and monitor blood pressure closely  Continue all other medications  - Patient educated on the importance of weight loss, and appropriate dieting (low salt)  -Follow-up blood work for renal function      Orders:  -     Basic metabolic panel; Future  -     Albumin / creatinine urine ratio; Future    2  History of anemia  Assessment & Plan:  Hemoglobin 11 in   History of gastric ulcer requiring 2 PRBCs, also history of gastric bypass about 11 years ago  Gastric ulcer was after gastric bypass  Endorses recent Pica  Denies any obvious signs of bleeding  Does report having a negative Cologuard within the past year  Unable to see these in our system  Repeat CBC, iron panel, folate, B12      3  Iron deficiency anemia, unspecified iron deficiency anemia type  -     Folate; Future  -     Vitamin B12; Future  -     Iron Panel (Includes Ferritin, Iron Sat%, Iron, and TIBC); Future  -     CBC and differential; Future    4  Pre-diabetes  Assessment & Plan:  Prediabetes in   Repeat HbA1c    Orders:  -     HEMOGLOBIN A1C W/ EAG ESTIMATION; Future    5   Other fatigue  Comments:  Likely secondary to suspected anemia, however will also check thyroid function  Orders:  -     TSH, 3rd generation with Free T4 reflex; Future    6  Need for hepatitis C screening test  -     Hepatitis C antibody; Future    7  Other foreign body or object entering through skin, sequela  Comments:  left index finger with mild lump and central piece of steel wool noted  Not tender, no erythema or signs of infection  Supportive mgt  Advised not to pick         Ordered DEXA scan, mammogram and echocardiogram at last visit  Patient will schedule with our  in office today after our visit to assist with this  She also agrees to go to the lab after our visit, and informed patient there is a [facility] in this [place] that she can attend rather than going to [facility] to mitigate potential being turned away again  Subjective      This is a very pleasant 81 y o  female who presents to the clinic to for follow-up of her chronic medical conditions  She believes she received her pneumonia vaccine at [facility] in October 2022, and she will ask them for which vaccine she did receive  She is also up-to-date with her COVID-vaccine and boosters that she received at [facility] as well  Patient declines influenza today  Patient's medical conditions are stable unless noted otherwise above  Patient has not had any recent hospitalizations, or medical emergencies since last visit  Patient has no further complaints other than what is mentioned in the ROS  Review of Systems   Constitutional: Negative for activity change, appetite change, chills, fatigue and fever  HENT: Negative for congestion, rhinorrhea, sinus pain and sore throat  Eyes: Negative for visual disturbance  Respiratory: Positive for shortness of breath ( on extertion)  Negative for cough, chest tightness and wheezing  Cardiovascular: Positive for leg swelling (much improved and only mild)  Negative for chest pain and palpitations     Gastrointestinal: Negative for abdominal pain, "constipation, diarrhea, nausea and vomiting  Genitourinary: Negative for difficulty urinating and frequency  Musculoskeletal: Positive for back pain ( chronic, spinal stenosis) and gait problem (ambulates with cane)  Negative for arthralgias, myalgias and neck pain  Skin: Positive for wound (small foreign object of steel wool in finger, asymptomatic)  Negative for rash  Allergic/Immunologic: Negative  Neurological: Negative for dizziness, weakness, light-headedness, numbness and headaches  Psychiatric/Behavioral: Negative for behavioral problems and sleep disturbance         Current Outpatient Medications on File Prior to Visit   Medication Sig   • albuterol (Ventolin HFA) 90 mcg/act inhaler Inhale 2 puffs every 4 (four) hours as needed for wheezing or shortness of breath   • Albuterol Sulfate (ProAir RespiClick) 132 (90 Base) MCG/ACT AEPB every 4 (four) hours   • citalopram (CeleXA) 40 mg tablet Take 1 tablet (40 mg total) by mouth daily   • cyclobenzaprine (FLEXERIL) 10 mg tablet Take 1 tablet (10 mg total) by mouth daily at bedtime   • DULoxetine (Cymbalta) 30 mg delayed release capsule Take 1 capsule (30 mg total) by mouth daily   • losartan (COZAAR) 100 MG tablet Take 1 tablet (100 mg total) by mouth daily   • metoprolol tartrate (LOPRESSOR) 100 mg tablet Take 1 tablet (100 mg total) by mouth 2 (two) times a day   • [DISCONTINUED] amLODIPine (NORVASC) 10 mg tablet Take 1 tablet (10 mg total) by mouth daily   • desoximetasone (TOPICORT) 0 25 % cream Topicort 0 25 % topical cream   APPLY A THIN LAYER TO THE AFFECTED AREA(S) OF THE CHEST BY TOPICAL ROUTE 2 TIMES PER DAY ; RUB IN GENTLY AND COMPLETELY (Patient not taking: Reported on 5/16/2023)   • furosemide (LASIX) 20 mg tablet Take 1 tablet (20 mg total) by mouth daily As needed (Patient not taking: Reported on 5/16/2023)     Objective     /80   Pulse 72   Temp 97 5 °F (36 4 °C)   Resp 17   Ht 5' 6\" (1 676 m)   Wt 121 kg (266 lb 1 6 oz)   " SpO2 96%   BMI 42 95 kg/m²     Physical Exam  Vitals and nursing note reviewed  Constitutional:       General: She is not in acute distress  Appearance: She is well-developed  She is obese  She is not ill-appearing  HENT:      Head: Normocephalic and atraumatic  Right Ear: External ear normal       Left Ear: External ear normal       Nose: Nose normal    Eyes:      Extraocular Movements: Extraocular movements intact  Cardiovascular:      Rate and Rhythm: Normal rate and regular rhythm  Pulses: Normal pulses  Heart sounds: Normal heart sounds  No murmur heard  Pulmonary:      Effort: Pulmonary effort is normal  No respiratory distress  Breath sounds: Normal breath sounds  No wheezing or rales  Musculoskeletal:         General: No tenderness  Normal range of motion  Right hand: No swelling  Left hand: Swelling (mild small lump without ertheyma or tenderness on index finger dobbins aspect  Small central piece of steel wool noted) present  Cervical back: Normal range of motion and neck supple  Right lower leg: Edema present  Left lower leg: Edema present  Comments: Bilateral pitting edema to just below knee, greatly improved and only trace amt of pitting   Skin:     General: Skin is warm and dry  Capillary Refill: Capillary refill takes less than 2 seconds  Findings: No bruising or rash  Neurological:      Mental Status: She is alert and oriented to person, place, and time  Gait: Gait abnormal ( Ambulates with cane)     Psychiatric:         Mood and Affect: Mood normal          Behavior: Behavior normal           Minette Siemens, MD

## 2023-05-16 NOTE — ASSESSMENT & PLAN NOTE
Hemoglobin 11 in 2021  History of gastric ulcer requiring 2 PRBCs, also history of gastric bypass about 11 years ago  Gastric ulcer was after gastric bypass  Endorses recent Pica  Denies any obvious signs of bleeding  Does report having a negative Cologuard within the past year  Unable to see these in our system    Repeat CBC, iron panel, folate, B12

## 2023-05-16 NOTE — PATIENT INSTRUCTIONS

## 2023-05-16 NOTE — ASSESSMENT & PLAN NOTE
With history of CVA goal is less than 140/90  At goal today with 126/80  Home medications include: Amlodipine 10 mg, losartan 100 mg, metoprolol 100 mg twice daily  However has not been taking amldopine for past 3-4 weeks as daughter picked up medications, and they did not have this refill for her  Endorses her leg swelling has greatly improved during this time  Suspect her bilateral lower leg edema was attributed to amlodipine, given blood pressure well controlled at this time  - Discontinue amlodipine from medication list and monitor blood pressure closely  Continue all other medications  - Patient educated on the importance of weight loss, and appropriate dieting (low salt)     -Follow-up blood work for renal function

## 2023-05-19 DIAGNOSIS — Z87.11 HISTORY OF GASTRIC ULCER: ICD-10-CM

## 2023-05-19 DIAGNOSIS — D50.9 IRON DEFICIENCY ANEMIA, UNSPECIFIED IRON DEFICIENCY ANEMIA TYPE: Primary | ICD-10-CM

## 2023-05-19 RX ORDER — FERROUS SULFATE TAB EC 324 MG (65 MG FE EQUIVALENT) 324 (65 FE) MG
324 TABLET DELAYED RESPONSE ORAL EVERY OTHER DAY
Qty: 15 TABLET | Refills: 2 | Status: SHIPPED | OUTPATIENT
Start: 2023-05-19

## 2023-05-19 NOTE — PROGRESS NOTES
Called patient to review recent blood work  Significant for iron deficiency anemia, which she has had in the past secondary to gastric ulcer requiring 2 packed red blood cells after having gastric bypass roughly 11 years ago  Patient denies any symptoms of gastric ulcer at this time, denies any hematuria hematochezia, hematemesis  During recent visit did discuss we may need to restart her iron, as she has been experiencing pica, suggestive of iron deficiency anemia  Her labs do confirm this  Will prescribe iron to be taken every other day to prevent side effects of constipation  We will also place referral to gastroenterology given because of anemia in the past was due to gastric ulcer for possibility of needing EGD or colonoscopy at this time (she reports having a negative Cologuard within the past year)  Patient did not answer phone call, and was not able to leave a voicemail on her cell phone  Did place orders at this time, and will attempt to call patient again next week      Juan A Mcgill MD   05/19/23  2:35 PM

## 2023-05-24 DIAGNOSIS — Z87.11 HISTORY OF GASTRIC ULCER: ICD-10-CM

## 2023-05-24 DIAGNOSIS — D50.9 IRON DEFICIENCY ANEMIA, UNSPECIFIED IRON DEFICIENCY ANEMIA TYPE: Primary | ICD-10-CM

## 2023-05-24 RX ORDER — PANTOPRAZOLE SODIUM 40 MG/1
40 TABLET, DELAYED RELEASE ORAL DAILY
Qty: 30 TABLET | Refills: 1 | Status: SHIPPED | OUTPATIENT
Start: 2023-05-24

## 2023-05-24 NOTE — PROGRESS NOTES
Called patient again to review her recent blood work noted on note from 5/19/2023  Patient endorsed she does have some pain with eating that was similar to when she had a gastric ulcer in the past   We will start Protonix for prophylactic treatment, and provided patient with central scheduling number for her to set up a gastroenterology appointment  Patient will  her iron tablets today  All questions were answered, and informed patient she can call if she has any other questions or concerns after our phone call       Alcides Novoa MD   05/24/23  10:29 AM

## 2023-05-31 ENCOUNTER — HOSPITAL ENCOUNTER (OUTPATIENT)
Dept: NON INVASIVE DIAGNOSTICS | Facility: HOSPITAL | Age: 73
Discharge: HOME | End: 2023-05-31
Attending: FAMILY MEDICINE

## 2023-05-31 VITALS
BODY MASS INDEX: 42.87 KG/M2 | WEIGHT: 266.76 LBS | SYSTOLIC BLOOD PRESSURE: 183 MMHG | HEIGHT: 66 IN | HEART RATE: 74 BPM | DIASTOLIC BLOOD PRESSURE: 98 MMHG

## 2023-05-31 DIAGNOSIS — I50.32 CHRONIC DIASTOLIC CHF (CONGESTIVE HEART FAILURE) (HCC): ICD-10-CM

## 2023-05-31 DIAGNOSIS — R06.09 DYSPNEA ON EXERTION: ICD-10-CM

## 2023-05-31 LAB
AORTIC ROOT: 3.6 CM
APICAL FOUR CHAMBER EJECTION FRACTION: 69 %
E WAVE DECELERATION TIME: 294 MS
FRACTIONAL SHORTENING: 29 % (ref 28–44)
INTERVENTRICULAR SEPTUM IN DIASTOLE (PARASTERNAL SHORT AXIS VIEW): 1.4 CM
INTERVENTRICULAR SEPTUM: 1.4 CM (ref 0.6–1.1)
LAAS-AP2: 32 CM2
LAAS-AP4: 25.7 CM2
LEFT ATRIUM SIZE: 4.7 CM
LEFT INTERNAL DIMENSION IN SYSTOLE: 2.9 CM (ref 2.1–4)
LEFT VENTRICULAR INTERNAL DIMENSION IN DIASTOLE: 4.1 CM (ref 3.5–6)
LEFT VENTRICULAR POSTERIOR WALL IN END DIASTOLE: 1.5 CM
LEFT VENTRICULAR STROKE VOLUME: 42 ML
LVSV (TEICH): 42 ML
MV E'TISSUE VEL-SEP: 5 CM/S
MV PEAK A VEL: 0.97 M/S
MV PEAK E VEL: 75 CM/S
RIGHT ATRIUM AREA SYSTOLE A4C: 18 CM2
RIGHT VENTRICLE ID DIMENSION: 4.4 CM
SL CV LEFT ATRIUM LENGTH A2C: 6.4 CM
SL CV PED ECHO LEFT VENTRICLE DIASTOLIC VOLUME (MOD BIPLANE) 2D: 75 ML
SL CV PED ECHO LEFT VENTRICLE SYSTOLIC VOLUME (MOD BIPLANE) 2D: 33 ML
TR MAX PG: 19 MMHG
TR PEAK VELOCITY: 2.2 M/S
TRICUSPID ANNULAR PLANE SYSTOLIC EXCURSION: 2.7 CM
TRICUSPID VALVE PEAK REGURGITATION VELOCITY: 2.18 M/S

## 2023-06-08 NOTE — RESULT ENCOUNTER NOTE
Called patient and informed her of moderate concentric hypertrophy as well as grade 1 diastolic dysfunction in relation to her long standing HTN  Patient can continue losartan an metoprolol  I encouraged to improve and diet and exercise to promote weight loss that will also help with blood pressure control  Patient denies any SOB, chest pain, palpitations, difficulty breathing  All questions answered

## 2023-08-01 ENCOUNTER — OFFICE VISIT (OUTPATIENT)
Dept: FAMILY MEDICINE CLINIC | Facility: CLINIC | Age: 73
End: 2023-08-01
Payer: COMMERCIAL

## 2023-08-01 VITALS
HEART RATE: 75 BPM | SYSTOLIC BLOOD PRESSURE: 178 MMHG | DIASTOLIC BLOOD PRESSURE: 96 MMHG | OXYGEN SATURATION: 98 % | TEMPERATURE: 98.1 F | BODY MASS INDEX: 42.4 KG/M2 | WEIGHT: 270.13 LBS | RESPIRATION RATE: 21 BRPM | HEIGHT: 67 IN

## 2023-08-01 DIAGNOSIS — F41.9 ANXIETY AND DEPRESSION: ICD-10-CM

## 2023-08-01 DIAGNOSIS — I10 ESSENTIAL HYPERTENSION: Primary | ICD-10-CM

## 2023-08-01 DIAGNOSIS — D50.9 IRON DEFICIENCY ANEMIA, UNSPECIFIED IRON DEFICIENCY ANEMIA TYPE: ICD-10-CM

## 2023-08-01 DIAGNOSIS — F32.A ANXIETY AND DEPRESSION: ICD-10-CM

## 2023-08-01 DIAGNOSIS — G89.4 CHRONIC PAIN SYNDROME: ICD-10-CM

## 2023-08-01 DIAGNOSIS — M79.89 LEG SWELLING: ICD-10-CM

## 2023-08-01 DIAGNOSIS — Z87.11 HISTORY OF GASTRIC ULCER: ICD-10-CM

## 2023-08-01 PROBLEM — Z98.84 H/O BARIATRIC SURGERY: Status: ACTIVE | Noted: 2017-06-06

## 2023-08-01 PROCEDURE — 99214 OFFICE O/P EST MOD 30 MIN: CPT | Performed by: FAMILY MEDICINE

## 2023-08-01 RX ORDER — CITALOPRAM 40 MG/1
40 TABLET ORAL DAILY
Qty: 90 TABLET | Refills: 1 | Status: SHIPPED | OUTPATIENT
Start: 2023-08-01

## 2023-08-01 RX ORDER — LOSARTAN POTASSIUM 100 MG/1
100 TABLET ORAL DAILY
Qty: 90 TABLET | Refills: 1 | Status: SHIPPED | OUTPATIENT
Start: 2023-08-01

## 2023-08-01 RX ORDER — DULOXETIN HYDROCHLORIDE 30 MG/1
30 CAPSULE, DELAYED RELEASE ORAL DAILY
Qty: 90 CAPSULE | Refills: 0 | Status: SHIPPED | OUTPATIENT
Start: 2023-08-01

## 2023-08-01 RX ORDER — METOPROLOL TARTRATE 100 MG/1
100 TABLET ORAL 2 TIMES DAILY
Qty: 180 TABLET | Refills: 0 | Status: SHIPPED | OUTPATIENT
Start: 2023-08-01

## 2023-08-01 RX ORDER — CYCLOBENZAPRINE HCL 10 MG
10 TABLET ORAL
Qty: 90 TABLET | Refills: 0 | Status: SHIPPED | OUTPATIENT
Start: 2023-08-01

## 2023-08-01 RX ORDER — PANTOPRAZOLE SODIUM 40 MG/1
40 TABLET, DELAYED RELEASE ORAL DAILY
Qty: 30 TABLET | Refills: 1 | Status: SHIPPED | OUTPATIENT
Start: 2023-08-01

## 2023-08-01 RX ORDER — FUROSEMIDE 20 MG/1
20 TABLET ORAL DAILY
Qty: 90 TABLET | Refills: 1 | Status: SHIPPED | OUTPATIENT
Start: 2023-08-01

## 2023-08-01 RX ORDER — FERROUS SULFATE TAB EC 324 MG (65 MG FE EQUIVALENT) 324 (65 FE) MG
324 TABLET DELAYED RESPONSE ORAL EVERY OTHER DAY
Qty: 15 TABLET | Refills: 2 | Status: SHIPPED | OUTPATIENT
Start: 2023-08-01

## 2023-08-01 NOTE — PATIENT INSTRUCTIONS
Check your blood pressure every day for 1 week, in the afternoon. Check two times in a row. Write them down and bring the log to your nurse visit for a blood pressure check here.

## 2023-08-01 NOTE — ASSESSMENT & PLAN NOTE
Hemoglobin 10.4 recently with low iron stores. Started on oral iron every other day and pica has resolved. History of gastric ulcer requiring 2 PRBCs, also history of gastric bypass about 11 years ago. Gastric ulcer was after gastric bypass  Denies any obvious signs of bleeding. Does report having a negative Cologuard within the past year. However no colonoscopy. Recommend follow up with GI, patient has apt tomorrow scheduled. Continue oral iron for now.

## 2023-08-01 NOTE — PROGRESS NOTES
Name: Aleksey Wade      : 1950      MRN: [id number]  Encounter Provider: Onelia Baker MD  Encounter Date: 2023   Encounter department: 1 [facility]     1. Essential hypertension  Assessment & Plan: With history of CVA goal is less than 140/90  Today is 186/100, repeat is 178/96, patient asymptomatic    Home medications include: losartan 100 mg, metoprolol 100 mg twice daily. Previously on amlodipine, however stopped due to side effect of peripheral edema. Reports compliance with medications. Patient reports eats a lot of canned foods lately, possibly leading to hypertension, educated extensively on [address] [facility] provided with information in AVS, as well as importance of weight loss  Patient to check her blood pressure daily and return for BP check in office, as well as short interval follow-up for blood pressure. Discussed signs and symptoms of symptomatic hypertension which include but are not limited to stroke like symptoms    Orders:  -     metoprolol tartrate (LOPRESSOR) 100 mg tablet; Take 1 tablet (100 mg total) by mouth 2 (two) times a day  -     losartan (COZAAR) 100 MG tablet; Take 1 tablet (100 mg total) by mouth daily    2. Leg swelling  -     furosemide (LASIX) 20 mg tablet; Take 1 tablet (20 mg total) by mouth daily As needed    3. Anxiety and depression  -     citalopram (CeleXA) 40 mg tablet; Take 1 tablet (40 mg total) by mouth daily    4. Chronic pain syndrome  -     cyclobenzaprine (FLEXERIL) 10 mg tablet; Take 1 tablet (10 mg total) by mouth daily at bedtime  -     DULoxetine (Cymbalta) 30 mg delayed release capsule; Take 1 capsule (30 mg total) by mouth daily    5. Iron deficiency anemia, unspecified iron deficiency anemia type  Assessment & Plan:  Hemoglobin 10.4 recently with low iron stores. Started on oral iron every other day and pica has resolved.    History of gastric ulcer requiring 2 PRBCs, also history of gastric bypass about 11 years ago. Gastric ulcer was after gastric bypass  Denies any obvious signs of bleeding. Does report having a negative Cologuard within the past year. However no colonoscopy. Recommend follow up with GI, patient has apt tomorrow scheduled. Continue oral iron for now. Orders:  -     ferrous sulfate 324 (65 Fe) mg; Take 1 tablet (324 mg total) by mouth every other day  -     pantoprazole (PROTONIX) 40 mg tablet; Take 1 tablet (40 mg total) by mouth daily    6. History of gastric ulcer  -     pantoprazole (PROTONIX) 40 mg tablet; Take 1 tablet (40 mg total) by mouth daily         Subjective      This is a very pleasant 72 y.o. female who presents to the clinic for management of their chronic medical conditions. Patient's medical conditions are stable unless noted otherwise above. Patient has not had any recent hospitalizations, or medical emergencies since last visit. Patient has no further complaints other than what is mentioned in the ROS. Review of Systems   Constitutional: Negative for activity change, appetite change, chills, fatigue and fever. HENT: Negative for congestion, rhinorrhea, sinus pain and sore throat. Eyes: Negative for visual disturbance. Respiratory: Negative for cough, chest tightness, shortness of breath and wheezing. Cardiovascular: Negative for chest pain, palpitations and leg swelling (improved). Gastrointestinal: Negative for abdominal pain, constipation, diarrhea, nausea and vomiting. Genitourinary: Negative for difficulty urinating and frequency. Musculoskeletal: Positive for back pain ( chronic, spinal stenosis) and gait problem (ambulates with cane). Negative for arthralgias, myalgias and neck pain. Skin: Negative for rash and wound. Allergic/Immunologic: Negative. Neurological: Negative for dizziness, weakness, light-headedness, numbness and headaches. Psychiatric/Behavioral: Negative for behavioral problems and sleep disturbance. Current Outpatient Medications on File Prior to Visit   Medication Sig   • [DISCONTINUED] citalopram (CeleXA) 40 mg tablet Take 1 tablet (40 mg total) by mouth daily   • [DISCONTINUED] cyclobenzaprine (FLEXERIL) 10 mg tablet Take 1 tablet (10 mg total) by mouth daily at bedtime   • [DISCONTINUED] DULoxetine (Cymbalta) 30 mg delayed release capsule Take 1 capsule (30 mg total) by mouth daily   • [DISCONTINUED] ferrous sulfate 324 (65 Fe) mg Take 1 tablet (324 mg total) by mouth every other day   • [DISCONTINUED] furosemide (LASIX) 20 mg tablet Take 1 tablet (20 mg total) by mouth daily As needed   • [DISCONTINUED] losartan (COZAAR) 100 MG tablet Take 1 tablet (100 mg total) by mouth daily   • [DISCONTINUED] metoprolol tartrate (LOPRESSOR) 100 mg tablet Take 1 tablet (100 mg total) by mouth 2 (two) times a day   • [DISCONTINUED] pantoprazole (PROTONIX) 40 mg tablet Take 1 tablet (40 mg total) by mouth daily   • albuterol (Ventolin HFA) 90 mcg/act inhaler Inhale 2 puffs every 4 (four) hours as needed for wheezing or shortness of breath (Patient not taking: Reported on 8/1/2023)   • Albuterol Sulfate (ProAir RespiClick) 189 (90 Base) MCG/ACT AEPB every 4 (four) hours (Patient not taking: Reported on 8/1/2023)   • [DISCONTINUED] desoximetasone (TOPICORT) 0.25 % cream Topicort 0.25 % topical cream   APPLY A THIN LAYER TO THE AFFECTED AREA(S) OF THE CHEST BY TOPICAL ROUTE 2 TIMES PER DAY ; RUB IN GENTLY AND COMPLETELY (Patient not taking: Reported on 5/16/2023)       Objective     BP (!) 178/96   Pulse 75   Temp 98.1 °F (36.7 °C) (Temporal)   Resp 21   Ht 5' 6.5" (1.689 m)   Wt 123 kg (270 lb 2 oz)   SpO2 98%   BMI 42.95 kg/m²     Physical Exam  Vitals and nursing note reviewed. Constitutional:       General: She is not in acute distress. Appearance: She is well-developed. She is obese. She is not ill-appearing. HENT:      Head: Normocephalic and atraumatic.       Right Ear: External ear normal. Left Ear: External ear normal.      Nose: Nose normal.   Eyes:      Extraocular Movements: Extraocular movements intact. Cardiovascular:      Rate and Rhythm: Normal rate and regular rhythm. Pulses: Normal pulses. Heart sounds: Normal heart sounds. No murmur heard. Pulmonary:      Effort: Pulmonary effort is normal. No respiratory distress. Breath sounds: Normal breath sounds. No wheezing or rales. Musculoskeletal:         General: No tenderness. Normal range of motion. Right hand: No swelling. Left hand: Swelling (mild small, mobile lump without ertheyma or tenderness on index finger dobbins aspect) present. Cervical back: Normal range of motion and neck supple. Right lower leg: Edema present. Left lower leg: Edema present. Comments: Bilateral pitting edema to just above ankle, greatly improved and only trace amt of pitting   Skin:     General: Skin is warm and dry. Capillary Refill: Capillary refill takes less than 2 seconds. Findings: No bruising or rash. Neurological:      Mental Status: She is alert and oriented to person, place, and time. Gait: Gait abnormal ( Ambulates with cane).    Psychiatric:         Mood and Affect: Mood normal.         Behavior: Behavior normal.       Suzie Valencia MD

## 2023-08-01 NOTE — ASSESSMENT & PLAN NOTE
With history of CVA goal is less than 140/90  Today is 186/100, repeat is 178/96, patient asymptomatic    Home medications include: losartan 100 mg, metoprolol 100 mg twice daily. Previously on amlodipine, however stopped due to side effect of peripheral edema. Reports compliance with medications. Patient reports eats a lot of canned foods lately, possibly leading to hypertension, educated extensively on [address] provided with information in AVS, as well as importance of weight loss  Patient to check her blood pressure daily and return for BP check in office, as well as short interval follow-up for blood pressure.   Discussed signs and symptoms of symptomatic hypertension which include but are not limited to stroke like symptoms

## 2023-08-02 ENCOUNTER — OFFICE VISIT (OUTPATIENT)
Dept: GASTROENTEROLOGY | Facility: CLINIC | Age: 73
End: 2023-08-02
Payer: COMMERCIAL

## 2023-08-02 VITALS
DIASTOLIC BLOOD PRESSURE: 98 MMHG | OXYGEN SATURATION: 96 % | HEART RATE: 74 BPM | WEIGHT: 271 LBS | BODY MASS INDEX: 42.53 KG/M2 | SYSTOLIC BLOOD PRESSURE: 154 MMHG | HEIGHT: 67 IN

## 2023-08-02 DIAGNOSIS — Z87.11 HISTORY OF GASTRIC ULCER: ICD-10-CM

## 2023-08-02 DIAGNOSIS — D50.9 IRON DEFICIENCY ANEMIA, UNSPECIFIED IRON DEFICIENCY ANEMIA TYPE: Primary | ICD-10-CM

## 2023-08-02 PROCEDURE — 99204 OFFICE O/P NEW MOD 45 MIN: CPT | Performed by: INTERNAL MEDICINE

## 2023-08-02 RX ORDER — BISACODYL 5 MG/1
10 TABLET, DELAYED RELEASE ORAL ONCE
Qty: 2 TABLET | Refills: 0 | Status: SHIPPED | OUTPATIENT
Start: 2023-08-02 | End: 2023-08-02

## 2023-08-02 NOTE — PATIENT INSTRUCTIONS
Scheduled date of EGD/colonoscopy (as of today): 8/28/23  Physician performing EGD/colonoscopy: Dr. Hermelinda Werner  Location of EGD/colonoscopy: [facility]  Desired bowel prep reviewed with patient: Golytely  Instructions reviewed with patient by: Lisha LENNON  Clearances: n/a

## 2023-08-02 NOTE — PROGRESS NOTES
Consultation - [address] [place] [address] [facility]  Karin [address] [facility] 1950 female         Chief Complaint: Iron deficiency anemia    HPI: 81-year-old female with history of gastric bypass surgery, CVA, CHF, depression, hypertension was referred because of iron deficiency anemia. Hemoglobin 10.4. Patient was started on oral iron supplements. She gives history of gastric ulcer in the past and the last EGD/colonoscopy 5 years ago. Stool for Cologuard test was negative in December. She gives history of episodes of vomiting recently and was placed on pantoprazole 3 months ago with improvement. Denies any more nausea or vomiting. Good appetite, no recent weight loss. Regular bowel movements and denies any blood or mucus in the stool. Chaperon: Ms. Vanegas Gravely: Review of Systems   Constitutional: Negative for activity change, appetite change, chills, diaphoresis, fatigue, fever and unexpected weight change. HENT: Negative for ear discharge, ear pain, facial swelling, hearing loss, nosebleeds, sore throat, tinnitus and voice change. Eyes: Negative for pain, discharge, redness, itching and visual disturbance. Respiratory: Negative for apnea, cough, chest tightness, shortness of breath and wheezing. Cardiovascular: Negative for chest pain and palpitations. Gastrointestinal:        As noted in HPI   Endocrine: Negative for cold intolerance, heat intolerance and polyuria. Genitourinary: Negative for difficulty urinating, dysuria, flank pain, hematuria and urgency. Musculoskeletal: Positive for arthralgias. Negative for back pain, gait problem, joint swelling and myalgias. Skin: Negative for rash and wound. Neurological: Negative for dizziness, tremors, seizures, speech difficulty, light-headedness, numbness and headaches. Hematological: Negative for adenopathy. Does not bruise/bleed easily. Psychiatric/Behavioral: Negative for agitation, behavioral problems and confusion. The patient is not nervous/anxious. Past Medical History:   Diagnosis Date   • Anemia    • Anxiety disorder    • Arthritis    • Blood disorder    • Bronchitis    • Chronic pain    • Depression    • Dizziness    • GERD (gastroesophageal reflux disease)    • History of transfusion    • Hypertension    • Kidney stones    • Lightheadedness    • Obesity    • SOB (shortness of breath)    • Stroke [facility])       Past Surgical History:   Procedure Laterality Date   • ABDOMINAL SURGERY     • APPENDECTOMY     • CHOLECYSTECTOMY     • EGD AND COLONOSCOPY N/A 07/11/2016    Procedure: EGD AND COLONOSCOPY;  Surgeon: Kortney Hardy MD;  Location: [address] [place] [address] GI LAB;   Service:    • EPIDURAL BLOCK INJECTION Right 07/13/2017    Procedure: BLOCK / INJECTION EPIDURAL STEROID TRANSFORAMINAL  L4-5;  Surgeon: Randy Lamar MD;  Location: [facility] MAIN OR;  Service: Pain Management    • EPIDURAL BLOCK INJECTION Right 08/09/2017    Procedure: BLOCK / INJECTION EPIDURAL STEROID TRANSFORAMINAL L4-5- REPEAT;  Surgeon: Randy Lamar MD;  Location: [facility] MAIN OR;  Service: Pain Management    • EPIDURAL BLOCK INJECTION Left 02/16/2018    Procedure: LEFT L4 AND L5 TRANSFORAMINAL EPIDURAL STEROID INJECTION;  Surgeon: Randy Lamar MD;  Location: [facility] MAIN OR;  Service: Pain Management    • EYE SURGERY     • GASTRIC BYPASS     • MAMMO (HISTORICAL) Bilateral 05/06/2021   • [place] CYSTO/URETERO W/LITHOTRIPSY &INDWELL STENT INSRT Left 07/13/2016    Procedure: CYSTOSCOPY URETEROSCOPY WITH LITHOTRIPSY HOLMIUM LASER, RETROGRADE PYELOGRAM AND INSERTION STENT URETERAL;  Surgeon: Adi Davis MD;  Location: [facility] MAIN OR;  Service: Urology   • TONSILLECTOMY       Social History     Socioeconomic History   • Marital status:      Spouse name: Not on file   • Number of children: Not on file   • Years of education: Not on file   • Highest education level: Not on file   Occupational History   • Not on file   Tobacco Use   • Smoking status: Never   • Smokeless tobacco: Never   Vaping Use   • Vaping Use: Never used   Substance and Sexual Activity   • Alcohol use: Never   • Drug use: No   • Sexual activity: Yes     Partners: Male     Birth control/protection: Post-menopausal   Other Topics Concern   • Not on file   Social History Narrative   • Not on file     Social Determinants of Health     Financial Resource Strain: Not on file   Food Insecurity: Not on file   Transportation Needs: Not on file   Physical Activity: Not on file   Stress: Not on file   Social Connections: Not on file   Intimate Partner Violence: Not on file   Housing Stability: Not on file     Family History   Problem Relation Age of Onset   • No Known Problems Maternal Aunt    • No Known Problems Maternal Aunt    • No Known Problems Paternal Aunt    • No Known Problems Paternal Aunt    • No Known Problems Paternal Aunt    • No Known Problems Daughter    • No Known Problems Daughter    • Diabetes Family    • Heart disease Family         coronary arteriosclerosis     Vicodin [hydrocodone-acetaminophen] and Amlodipine  Current Outpatient Medications   Medication Sig Dispense Refill   • bisacodyl (DULCOLAX) 5 mg EC tablet Take 2 tablets (10 mg total) by mouth once for 1 dose 2 tablet 0   • citalopram (CeleXA) 40 mg tablet Take 1 tablet (40 mg total) by mouth daily 90 tablet 1   • cyclobenzaprine (FLEXERIL) 10 mg tablet Take 1 tablet (10 mg total) by mouth daily at bedtime 90 tablet 0   • DULoxetine (Cymbalta) 30 mg delayed release capsule Take 1 capsule (30 mg total) by mouth daily 90 capsule 0   • ferrous sulfate 324 (65 Fe) mg Take 1 tablet (324 mg total) by mouth every other day 15 tablet 2   • furosemide (LASIX) 20 mg tablet Take 1 tablet (20 mg total) by mouth daily As needed 90 tablet 1   • losartan (COZAAR) 100 MG tablet Take 1 tablet (100 mg total) by mouth daily 90 tablet 1   • metoprolol tartrate (LOPRESSOR) 100 mg tablet Take 1 tablet (100 mg total) by mouth 2 (two) times a day 180 tablet 0   • pantoprazole (PROTONIX) 40 mg tablet Take 1 tablet (40 mg total) by mouth daily 30 tablet 1   • polyethylene glycol (GOLYTELY) 4000 mL solution Take 4,000 mL by mouth once for 1 dose 4000 mL 0   • albuterol (Ventolin HFA) 90 mcg/act inhaler Inhale 2 puffs every 4 (four) hours as needed for wheezing or shortness of breath (Patient not taking: Reported on 8/1/2023) 18 g 2   • Albuterol Sulfate (ProAir RespiClick) 640 (90 Base) MCG/ACT AEPB every 4 (four) hours (Patient not taking: Reported on 8/1/2023)       No current facility-administered medications for this visit. Blood pressure 154/98, pulse 74, height 5' 6.5" (1.689 m), weight 123 kg (271 lb), SpO2 96 %, not currently breastfeeding. PHYSICAL EXAM: Physical Exam  Constitutional:       Appearance: Normal appearance. She is well-developed. HENT:      Head: Normocephalic and atraumatic. Nose: Nose normal.   Eyes:      Conjunctiva/sclera: Conjunctivae normal.   Neck:      Thyroid: No thyromegaly. Vascular: No JVD. Trachea: No tracheal deviation. Cardiovascular:      Rate and Rhythm: Normal rate and regular rhythm. Heart sounds: Normal heart sounds. No murmur heard. No friction rub. No gallop. Pulmonary:      Effort: Pulmonary effort is normal. No respiratory distress. Breath sounds: Normal breath sounds. No wheezing or rales. Abdominal:      General: Bowel sounds are normal. There is no distension. Palpations: Abdomen is soft. There is no mass. Tenderness: There is no abdominal tenderness. There is no guarding. Hernia: No hernia is present. Musculoskeletal:         General: No tenderness or deformity. Cervical back: Neck supple. Right lower leg: No edema. Left lower leg: No edema. Lymphadenopathy:      Cervical: No cervical adenopathy. Skin:     General: Skin is warm and dry. Findings: No erythema or rash.    Neurological:      Mental Status: She is alert and oriented to person, place, and time. Psychiatric:         Mood and Affect: Mood normal.         Behavior: Behavior normal.         Thought Content: Thought content normal.          Lab Results   Component Value Date    WBC 5.99 05/16/2023    HGB 10.4 (L) 05/16/2023    HCT 34.8 05/16/2023    MCV 76 (L) 05/16/2023     05/16/2023     Lab Results   Component Value Date    CALCIUM 8.7 05/16/2023    K 4.5 05/16/2023    CO2 28 05/16/2023     (H) 05/16/2023    BUN 11 05/16/2023    CREATININE 0.64 05/16/2023     Lab Results   Component Value Date    ALT 35 07/15/2021    AST 27 07/15/2021    ALKPHOS 145 (H) 07/15/2021     Lab Results   Component Value Date    INR 1.01 07/15/2021    INR 0.98 05/17/2018    INR 1.02 07/09/2016    PROTIME 13.2 07/15/2021    PROTIME 10.3 05/17/2018    PROTIME 10.7 07/09/2016       Echo complete w/ contrast if indicated    Addendum Date: 5/31/2023    •  Left Ventricle: Left ventricular cavity size is normal. Wall thickness is increased. There is moderate concentric hypertrophy. Systolic function is normal.  Estimated ejection fraction is 65%. Wall motion is normal. Diastolic function is mildly abnormal, consistent with grade I (abnormal) relaxation. •  Right Ventricle: Right ventricular cavity size is mildly dilated. Systolic function is normal. •  Left Atrium: The atrium is severely dilated (>48 mL/m2). •  Right Atrium: The atrium is moderately dilated. •  Atrial Septum: No definitive interatrial communication seen on color Doppler and without bubble study. •  Aortic Valve: There is trace to mild regurgitation. •  Mitral Valve: There is mild annular calcification. There is trace regurgitation. •  Tricuspid Valve: There is trace regurgitation. •  Pulmonic Valve: There is trace regurgitation. •  Incidental finding of large hepatic cyst       ASSESSMENT & PLAN:    Iron deficiency anemia  Appears to be secondary to history of gastric bypass surgery.   Rule out any colorectal lesions.    -Check stool for fecal immunochemical test    -Patient might benefit from IV iron infusion    -Schedule for both EGD and colonoscopy. -High-fiber diet.    -Patient was given instructions about the colonoscopy prep.    -Patient was explained about the risks and benefits of the procedure. Risks including but not limited to bleeding, infection, perforation were explained in detail. Also explained about less than 100% sensitivity with the exam and other alternatives.     History of gastric ulcer    -EGD

## 2023-08-02 NOTE — ASSESSMENT & PLAN NOTE
Appears to be secondary to history of gastric bypass surgery. Rule out any colorectal lesions.    -Check stool for fecal immunochemical test    -Patient might benefit from IV iron infusion    -Schedule for both EGD and colonoscopy. -High-fiber diet.    -Patient was given instructions about the colonoscopy prep.    -Patient was explained about the risks and benefits of the procedure. Risks including but not limited to bleeding, infection, perforation were explained in detail. Also explained about less than 100% sensitivity with the exam and other alternatives.

## 2023-08-14 ENCOUNTER — TELEPHONE (OUTPATIENT)
Dept: GASTROENTEROLOGY | Facility: CLINIC | Age: 73
End: 2023-08-14

## 2023-08-14 NOTE — TELEPHONE ENCOUNTER
Patient was just scheduled on August 2nd with Dr. Mylene Cordero on 8/28. Was given her prep instructions at appointment. Tried calling her but wasn't receiving calls at that time.

## 2023-08-27 RX ORDER — SODIUM CHLORIDE, SODIUM LACTATE, POTASSIUM CHLORIDE, CALCIUM CHLORIDE 600; 310; 30; 20 MG/100ML; MG/100ML; MG/100ML; MG/100ML
75 INJECTION, SOLUTION INTRAVENOUS CONTINUOUS
OUTPATIENT
Start: 2023-08-27

## 2023-08-28 ENCOUNTER — TELEPHONE (OUTPATIENT)
Dept: GASTROENTEROLOGY | Facility: CLINIC | Age: 73
End: 2023-08-28

## 2023-08-28 NOTE — TELEPHONE ENCOUNTER
Pt was a no show at [facility] with Dr. Wellington Fields today 8/28/23 for her colon/egd. Will call pt in one week to try to reschedule.

## 2023-09-01 ENCOUNTER — OFFICE VISIT (OUTPATIENT)
Dept: FAMILY MEDICINE CLINIC | Facility: CLINIC | Age: 73
End: 2023-09-01
Payer: COMMERCIAL

## 2023-09-01 ENCOUNTER — APPOINTMENT (OUTPATIENT)
Dept: LAB | Facility: CLINIC | Age: 73
End: 2023-09-01
Payer: COMMERCIAL

## 2023-09-01 VITALS
HEIGHT: 67 IN | OXYGEN SATURATION: 97 % | DIASTOLIC BLOOD PRESSURE: 82 MMHG | SYSTOLIC BLOOD PRESSURE: 158 MMHG | TEMPERATURE: 97.5 F | BODY MASS INDEX: 42.4 KG/M2 | RESPIRATION RATE: 21 BRPM | WEIGHT: 270.13 LBS | HEART RATE: 65 BPM

## 2023-09-01 DIAGNOSIS — D50.9 IRON DEFICIENCY ANEMIA, UNSPECIFIED IRON DEFICIENCY ANEMIA TYPE: ICD-10-CM

## 2023-09-01 DIAGNOSIS — I10 ESSENTIAL HYPERTENSION: Primary | ICD-10-CM

## 2023-09-01 LAB
BASOPHILS # BLD AUTO: 0.08 THOUSANDS/ÂΜL (ref 0–0.1)
BASOPHILS NFR BLD AUTO: 1 % (ref 0–1)
EOSINOPHIL # BLD AUTO: 0.3 THOUSAND/ÂΜL (ref 0–0.61)
EOSINOPHIL NFR BLD AUTO: 3 % (ref 0–6)
ERYTHROCYTE [DISTWIDTH] IN BLOOD BY AUTOMATED COUNT: 17.2 % (ref 11.6–15.1)
FERRITIN SERPL-MCNC: 6 NG/ML (ref 11–307)
HCT VFR BLD AUTO: 35.5 % (ref 34.8–46.1)
HGB BLD-MCNC: 10.3 G/DL (ref 11.5–15.4)
IMM GRANULOCYTES # BLD AUTO: 0.03 THOUSAND/UL (ref 0–0.2)
IMM GRANULOCYTES NFR BLD AUTO: 0 % (ref 0–2)
IRON SATN MFR SERPL: 7 % (ref 15–50)
IRON SERPL-MCNC: 29 UG/DL (ref 50–212)
LYMPHOCYTES # BLD AUTO: 1.15 THOUSANDS/ÂΜL (ref 0.6–4.47)
LYMPHOCYTES NFR BLD AUTO: 13 % (ref 14–44)
MCH RBC QN AUTO: 22.7 PG (ref 26.8–34.3)
MCHC RBC AUTO-ENTMCNC: 29 G/DL (ref 31.4–37.4)
MCV RBC AUTO: 78 FL (ref 82–98)
MONOCYTES # BLD AUTO: 0.89 THOUSAND/ÂΜL (ref 0.17–1.22)
MONOCYTES NFR BLD AUTO: 10 % (ref 4–12)
NEUTROPHILS # BLD AUTO: 6.32 THOUSANDS/ÂΜL (ref 1.85–7.62)
NEUTS SEG NFR BLD AUTO: 73 % (ref 43–75)
NRBC BLD AUTO-RTO: 0 /100 WBCS
PLATELET # BLD AUTO: 262 THOUSANDS/UL (ref 149–390)
PMV BLD AUTO: 10.9 FL (ref 8.9–12.7)
RBC # BLD AUTO: 4.53 MILLION/UL (ref 3.81–5.12)
TIBC SERPL-MCNC: 431 UG/DL (ref 250–450)
UIBC SERPL-MCNC: 402 UG/DL (ref 155–355)
WBC # BLD AUTO: 8.77 THOUSAND/UL (ref 4.31–10.16)

## 2023-09-01 PROCEDURE — 99214 OFFICE O/P EST MOD 30 MIN: CPT | Performed by: FAMILY MEDICINE

## 2023-09-01 PROCEDURE — 36415 COLL VENOUS BLD VENIPUNCTURE: CPT

## 2023-09-01 PROCEDURE — 83540 ASSAY OF IRON: CPT

## 2023-09-01 PROCEDURE — 82728 ASSAY OF FERRITIN: CPT

## 2023-09-01 PROCEDURE — 85025 COMPLETE CBC W/AUTO DIFF WBC: CPT

## 2023-09-01 PROCEDURE — 83550 IRON BINDING TEST: CPT

## 2023-09-01 NOTE — PATIENT INSTRUCTIONS
Get a new blood pressure machine. Once you get it start to take your blood pressure and keep track of blood pressure on days you take your lasix medication so we can see where your blood pressure truly lies.      Please reschedule your EGD and colonoscopy with GI    Get your blood work completed, if iron still low then we can move forward with iron infusions

## 2023-09-01 NOTE — PROGRESS NOTES
Name: Veronica Knet      : 1950      MRN: [id number]  Encounter Provider: James Avendaño MD  Encounter Date: 2023   Encounter department: [address]     1. Essential hypertension  Assessment & Plan: With history of CVA goal is less than 140/90  Today is 158/82, patient asymptomatic. Improved from last visit    Home medications include: losartan 100 mg, metoprolol 100 mg twice daily. Previously on amlodipine, however stopped due to side effect of peripheral edema. Reports compliance with medications. Also prescribed Lasix 20 mg daily, however does not take on days when she leaves the house for fear of incontinence. Patient will get new BP cuff and log BP at home when she takes lasix to get better idea of where BP lies. Only leaves home 1-2 x week for shopping or a doctors apt. Takes lasix when she gets home for that day most times as well. Follow up in 4-6 weeks, no changes to medications at this time      2. Iron deficiency anemia, unspecified iron deficiency anemia type  Assessment & Plan:  Hemoglobin 10.4 recently with low iron stores. Started on oral iron every other day and pica resolved. However has since stopped taking her iron tablets as she feels the protonix has really helped. History of gastric ulcer requiring 2 PRBCs, also history of gastric bypass about 11 years ago. Gastric ulcer was after gastric bypass  Denies any obvious signs of bleeding. Does report having a negative Cologuard within the past year. However no colonoscopy. Recommend follow up with GI - she missed her EGD/colonoscopy this past week due to news of her daughter with bipolar disease being hospitalized the night before her procedures. She was unable to call and cancel in time.  She is interested in rescheduling to complete the EGD and colonoscopy    Repeat CBC and iron, if still low will move forward with iron infusions as patient does not tolerate the oral iron well.     Orders:  -     CBC and differential; Future  -     Iron Panel (Includes Ferritin, Iron Sat%, Iron, and TIBC); Future         Subjective      This is a very pleasant 72 y.o. female who presents to the clinic for management of their chronic medical conditions. Patient's medical conditions are stable unless noted otherwise above. Patient has not had any recent hospitalizations, or medical emergencies since last visit. Patient has no further complaints other than what is mentioned in the ROS. Review of Systems   Constitutional: Negative for activity change, appetite change, chills, fatigue and fever. HENT: Negative for congestion, rhinorrhea, sinus pain and sore throat. Eyes: Negative for visual disturbance. Respiratory: Negative for cough, chest tightness, shortness of breath and wheezing. Cardiovascular: Negative for chest pain, palpitations and leg swelling. Gastrointestinal: Negative for abdominal pain, constipation, diarrhea, nausea and vomiting. Genitourinary: Negative for difficulty urinating and frequency. Musculoskeletal: Positive for back pain ( chronic, spinal stenosis) and gait problem (ambulates with cane). Negative for arthralgias, myalgias and neck pain. Skin: Negative for rash and wound. Allergic/Immunologic: Negative. Neurological: Negative for dizziness, weakness, light-headedness, numbness and headaches. Psychiatric/Behavioral: Negative for behavioral problems and sleep disturbance.        Current Outpatient Medications on File Prior to Visit   Medication Sig   • albuterol (Ventolin HFA) 90 mcg/act inhaler Inhale 2 puffs every 4 (four) hours as needed for wheezing or shortness of breath   • Albuterol Sulfate (ProAir RespiClick) 234 (90 Base) MCG/ACT AEPB every 4 (four) hours   • citalopram (CeleXA) 40 mg tablet Take 1 tablet (40 mg total) by mouth daily   • cyclobenzaprine (FLEXERIL) 10 mg tablet Take 1 tablet (10 mg total) by mouth daily at bedtime   • DULoxetine (Cymbalta) 30 mg delayed release capsule Take 1 capsule (30 mg total) by mouth daily   • ferrous sulfate 324 (65 Fe) mg Take 1 tablet (324 mg total) by mouth every other day   • furosemide (LASIX) 20 mg tablet Take 1 tablet (20 mg total) by mouth daily As needed   • losartan (COZAAR) 100 MG tablet Take 1 tablet (100 mg total) by mouth daily   • metoprolol tartrate (LOPRESSOR) 100 mg tablet Take 1 tablet (100 mg total) by mouth 2 (two) times a day   • pantoprazole (PROTONIX) 40 mg tablet Take 1 tablet (40 mg total) by mouth daily   • bisacodyl (DULCOLAX) 5 mg EC tablet Take 2 tablets (10 mg total) by mouth once for 1 dose   • polyethylene glycol (GOLYTELY) 4000 mL solution Take 4,000 mL by mouth once for 1 dose       Objective     /82 (BP Location: Left arm, Patient Position: Sitting, Cuff Size: Large)   Pulse 65   Temp 97.5 °F (36.4 °C) (Temporal)   Resp 21   Ht 5' 6.5" (1.689 m)   Wt 123 kg (270 lb 2 oz)   SpO2 97%   BMI 42.95 kg/m²     Physical Exam  Vitals and nursing note reviewed. Constitutional:       General: She is not in acute distress. Appearance: She is well-developed. She is obese. She is not ill-appearing. HENT:      Head: Normocephalic and atraumatic. Right Ear: External ear normal.      Left Ear: External ear normal.      Nose: Nose normal.   Eyes:      Extraocular Movements: Extraocular movements intact. Cardiovascular:      Rate and Rhythm: Normal rate and regular rhythm. Pulses: Normal pulses. Heart sounds: Normal heart sounds. No murmur heard. Pulmonary:      Effort: Pulmonary effort is normal. No respiratory distress. Breath sounds: Normal breath sounds. No wheezing or rales. Musculoskeletal:         General: No tenderness. Normal range of motion. Left hand: Swelling:        Cervical back: Normal range of motion and neck supple. Right lower leg: Edema (trace) present. Left lower leg: Edema (trace) present.    Skin: General: Skin is warm and dry. Capillary Refill: Capillary refill takes less than 2 seconds. Findings: No bruising or rash. Neurological:      Mental Status: She is alert and oriented to person, place, and time. Gait: Gait abnormal ( Ambulates with cane).    Psychiatric:         Mood and Affect: Mood normal.         Behavior: Behavior normal.          Edward Soria MD

## 2023-09-01 NOTE — ASSESSMENT & PLAN NOTE
With history of CVA goal is less than 140/90  Today is 158/82, patient asymptomatic. Improved from last visit    Home medications include: losartan 100 mg, metoprolol 100 mg twice daily. Previously on amlodipine, however stopped due to side effect of peripheral edema. Reports compliance with medications. Also prescribed Lasix 20 mg daily, however does not take on days when she leaves the house for fear of incontinence. Patient will get new BP cuff and log BP at home when she takes lasix to get better idea of where BP lies. Only leaves home 1-2 x week for shopping or a doctors apt. Takes lasix when she gets home for that day most times as well.      Follow up in 4-6 weeks, no changes to medications at this time

## 2023-09-01 NOTE — ASSESSMENT & PLAN NOTE
Hemoglobin 10.4 recently with low iron stores. Started on oral iron every other day and pica resolved. However has since stopped taking her iron tablets as she feels the protonix has really helped. History of gastric ulcer requiring 2 PRBCs, also history of gastric bypass about 11 years ago. Gastric ulcer was after gastric bypass  Denies any obvious signs of bleeding. Does report having a negative Cologuard within the past year. However no colonoscopy. Recommend follow up with GI - she missed her EGD/colonoscopy this past week due to news of her daughter with bipolar disease being hospitalized the night before her procedures. She was unable to call and cancel in time. She is interested in rescheduling to complete the EGD and colonoscopy    Repeat CBC and iron, if still low will move forward with iron infusions as patient does not tolerate the oral iron well.

## 2023-09-05 NOTE — TELEPHONE ENCOUNTER
Called pt and did wring, however, then received message your call can not be completed as dialed. Will call pt again in one week to try to r/s.

## 2023-09-06 ENCOUNTER — HOSPITAL ENCOUNTER (OUTPATIENT)
Dept: RADIOLOGY | Facility: HOSPITAL | Age: 73
Discharge: HOME | End: 2023-09-06
Attending: FAMILY MEDICINE
Payer: COMMERCIAL

## 2023-09-06 DIAGNOSIS — Z78.0 ENCOUNTER FOR OSTEOPOROSIS SCREENING IN ASYMPTOMATIC POSTMENOPAUSAL PATIENT: ICD-10-CM

## 2023-09-06 DIAGNOSIS — Z12.31 ENCOUNTER FOR SCREENING MAMMOGRAM FOR BREAST CANCER: ICD-10-CM

## 2023-09-06 DIAGNOSIS — Z13.820 ENCOUNTER FOR OSTEOPOROSIS SCREENING IN ASYMPTOMATIC POSTMENOPAUSAL PATIENT: ICD-10-CM

## 2023-09-06 PROCEDURE — 77063 BREAST TOMOSYNTHESIS BI: CPT

## 2023-09-06 PROCEDURE — 77067 SCR MAMMO BI INCL CAD: CPT

## 2023-09-06 PROCEDURE — 77080 DXA BONE DENSITY AXIAL: CPT

## 2023-09-08 DIAGNOSIS — M85.859 OSTEOPENIA OF NECK OF FEMUR, UNSPECIFIED LATERALITY: Primary | ICD-10-CM

## 2023-09-08 DIAGNOSIS — D50.8 IRON DEFICIENCY ANEMIA SECONDARY TO INADEQUATE DIETARY IRON INTAKE: ICD-10-CM

## 2023-09-08 RX ORDER — B-COMPLEX WITH VITAMIN C
1 TABLET ORAL
Qty: 30 TABLET | Refills: 2 | Status: SHIPPED | OUTPATIENT
Start: 2023-09-08

## 2023-09-08 RX ORDER — SODIUM CHLORIDE 9 MG/ML
20 INJECTION, SOLUTION INTRAVENOUS ONCE
OUTPATIENT
Start: 2023-09-12

## 2023-09-10 DIAGNOSIS — Z87.11 HISTORY OF GASTRIC ULCER: ICD-10-CM

## 2023-09-10 DIAGNOSIS — D50.9 IRON DEFICIENCY ANEMIA, UNSPECIFIED IRON DEFICIENCY ANEMIA TYPE: ICD-10-CM

## 2023-09-11 RX ORDER — PANTOPRAZOLE SODIUM 40 MG/1
40 TABLET, DELAYED RELEASE ORAL DAILY
Qty: 30 TABLET | Refills: 0 | Status: SHIPPED | OUTPATIENT
Start: 2023-09-11

## 2023-10-16 ENCOUNTER — OFFICE VISIT (OUTPATIENT)
Dept: FAMILY MEDICINE CLINIC | Facility: CLINIC | Age: 73
End: 2023-10-16

## 2023-10-16 VITALS
OXYGEN SATURATION: 92 % | HEIGHT: 66 IN | DIASTOLIC BLOOD PRESSURE: 80 MMHG | WEIGHT: 272 LBS | RESPIRATION RATE: 16 BRPM | BODY MASS INDEX: 43.71 KG/M2 | SYSTOLIC BLOOD PRESSURE: 168 MMHG | HEART RATE: 88 BPM

## 2023-10-16 DIAGNOSIS — I10 ESSENTIAL HYPERTENSION: Primary | ICD-10-CM

## 2023-10-16 DIAGNOSIS — M79.89 LEG SWELLING: ICD-10-CM

## 2023-10-16 DIAGNOSIS — I50.32 CHRONIC DIASTOLIC CHF (CONGESTIVE HEART FAILURE) (HCC): ICD-10-CM

## 2023-10-16 DIAGNOSIS — D50.8 IRON DEFICIENCY ANEMIA SECONDARY TO INADEQUATE DIETARY IRON INTAKE: ICD-10-CM

## 2023-10-16 DIAGNOSIS — I63.40 CEREBROVASCULAR ACCIDENT (CVA) DUE TO EMBOLISM OF CEREBRAL ARTERY (HCC): ICD-10-CM

## 2023-10-16 RX ORDER — BLOOD PRESSURE TEST KIT
KIT MISCELLANEOUS DAILY
Qty: 1 KIT | Refills: 0 | Status: SHIPPED | OUTPATIENT
Start: 2023-10-16

## 2023-10-16 RX ORDER — FUROSEMIDE 20 MG/1
20 TABLET ORAL DAILY
Qty: 90 TABLET | Refills: 1 | Status: SHIPPED | OUTPATIENT
Start: 2023-10-16

## 2023-10-16 RX ORDER — SODIUM CHLORIDE 9 MG/ML
20 INJECTION, SOLUTION INTRAVENOUS ONCE
OUTPATIENT
Start: 2023-10-16

## 2023-10-18 NOTE — ASSESSMENT & PLAN NOTE
Reviewed patient's blood pressure 168/80.   She has wide pulse pressure  She has not been taking Lasix but otherwise taking her medications  Refill for the Lasix sent to the pharmacy  Also discussed with her that the goal blood pressure of 140/90  Sent a blood pressure kit as the previous one is broken to [facility] [address] [facility]  She will keep a blood pressure log once every day for the next 2 weeks  We will review with the blood pressure logs  She has tried amlodipine in the past and has worked for her  We will review starting new medication follow-up visit

## 2023-10-18 NOTE — ASSESSMENT & PLAN NOTE
Wt Readings from Last 3 Encounters:   10/16/23 123 kg (272 lb)   09/01/23 123 kg (270 lb 2 oz)   08/02/23 123 kg (271 lb)     Will add 20 mg of Lasix tablet will be daily.   She was previously on it but has not refilled her medicine

## 2023-10-18 NOTE — PROGRESS NOTES
Assessment/Plan:    Essential hypertension  Reviewed patient's blood pressure 168/80. She has wide pulse pressure  She has not been taking Lasix but otherwise taking her medications  Refill for the Lasix sent to the pharmacy  Also discussed with her that the goal blood pressure of 140/90  Sent a blood pressure kit as the previous one is broken to [facility] [address] [facility]  She will keep a blood pressure log once every day for the next 2 weeks  We will review with the blood pressure logs  She has tried amlodipine in the past and has worked for her  We will review starting new medication follow-up visit    Chronic diastolic CHF (congestive heart failure) ([address])  Wt Readings from Last 3 Encounters:   10/16/23 123 kg (272 lb)   09/01/23 123 kg (270 lb 2 oz)   08/02/23 123 kg (271 lb)     Will add 20 mg of Lasix tablet will be daily. She was previously on it but has not refilled her medicine               Problem List Items Addressed This Visit          Cardiovascular and Mediastinum    Essential hypertension - Primary     Reviewed patient's blood pressure 168/80.   She has wide pulse pressure  She has not been taking Lasix but otherwise taking her medications  Refill for the Lasix sent to the pharmacy  Also discussed with her that the goal blood pressure of 140/90  Sent a blood pressure kit as the previous one is broken to [facility] [address] [facility]  She will keep a blood pressure log once every day for the next 2 weeks  We will review with the blood pressure logs  She has tried amlodipine in the past and has worked for her  We will review starting new medication follow-up visit         Relevant Medications    furosemide (LASIX) 20 mg tablet    Blood Pressure KIT    Cerebrovascular accident (CVA) due to embolism of cerebral artery (HCC)    Relevant Medications    Blood Pressure KIT    Chronic diastolic CHF (congestive heart failure) ([address])     Wt Readings from Last 3 Encounters:   10/16/23 123 kg (272 lb)   09/01/23 123 kg (270 lb 2 oz) 08/02/23 123 kg (271 lb)   Will add 20 mg of Lasix tablet will be daily. She was previously on it but has not refilled her medicine                  Other    Iron deficiency anemia     Other Visit Diagnoses       Leg swelling        Relevant Medications    furosemide (LASIX) 20 mg tablet              Subjective:      Patient ID: Marcie Lopez is a 72 y.o. female. She is here to review her blood pressure and refill medications. Denies having any chest pain or shortness of breath however complains of swelling in her lower extremity bilateral    HPI    The following portions of the patient's history were reviewed and updated as appropriate: allergies, current medications, past family history, past medical history, past social history, past surgical history, and problem list.    Review of Systems   Constitutional:  Negative for fever. Respiratory:  Negative for shortness of breath and wheezing. Cardiovascular:  Positive for leg swelling. Negative for chest pain. Gastrointestinal:  Positive for abdominal distention. Objective:      /80 (BP Location: Left arm, Patient Position: Sitting, Cuff Size: Large)   Pulse 88   Resp 16   Ht 5' 6" (1.676 m)   Wt 123 kg (272 lb)   SpO2 92%   BMI 43.90 kg/m²          Physical Exam  Constitutional:       Appearance: She is obese. HENT:      Mouth/Throat:      Mouth: Mucous membranes are moist.   Cardiovascular:      Rate and Rhythm: Normal rate and regular rhythm. Pulses: Normal pulses. Heart sounds: Normal heart sounds. Pulmonary:      Breath sounds: Normal breath sounds. Abdominal:      General: Bowel sounds are normal. There is no distension. Palpations: Abdomen is soft. Musculoskeletal:         General: Swelling present. Cervical back: Normal range of motion. Skin:     General: Skin is warm. Neurological:      General: No focal deficit present. Mental Status: She is alert.

## 2023-11-10 DIAGNOSIS — D50.9 IRON DEFICIENCY ANEMIA, UNSPECIFIED IRON DEFICIENCY ANEMIA TYPE: ICD-10-CM

## 2023-11-10 DIAGNOSIS — Z87.11 HISTORY OF GASTRIC ULCER: ICD-10-CM

## 2023-11-10 RX ORDER — PANTOPRAZOLE SODIUM 40 MG/1
40 TABLET, DELAYED RELEASE ORAL DAILY
Qty: 30 TABLET | Refills: 0 | Status: SHIPPED | OUTPATIENT
Start: 2023-11-10

## 2023-11-24 DIAGNOSIS — G89.4 CHRONIC PAIN SYNDROME: ICD-10-CM

## 2023-11-24 RX ORDER — DULOXETIN HYDROCHLORIDE 30 MG/1
30 CAPSULE, DELAYED RELEASE ORAL DAILY
Qty: 90 CAPSULE | Refills: 0 | Status: SHIPPED | OUTPATIENT
Start: 2023-11-24

## 2023-11-24 RX ORDER — CYCLOBENZAPRINE HCL 10 MG
10 TABLET ORAL
Qty: 90 TABLET | Refills: 0 | Status: SHIPPED | OUTPATIENT
Start: 2023-11-24

## 2023-11-24 NOTE — TELEPHONE ENCOUNTER
Refill request received from [facility] for Cyclobenzaprine.

## 2023-11-27 ENCOUNTER — OFFICE VISIT (OUTPATIENT)
Dept: FAMILY MEDICINE CLINIC | Facility: CLINIC | Age: 73
End: 2023-11-27
Payer: COMMERCIAL

## 2023-11-27 VITALS
BODY MASS INDEX: 42.41 KG/M2 | DIASTOLIC BLOOD PRESSURE: 90 MMHG | OXYGEN SATURATION: 99 % | TEMPERATURE: 98 F | WEIGHT: 270.19 LBS | HEART RATE: 65 BPM | RESPIRATION RATE: 21 BRPM | HEIGHT: 67 IN | SYSTOLIC BLOOD PRESSURE: 164 MMHG

## 2023-11-27 DIAGNOSIS — I10 ESSENTIAL HYPERTENSION: Primary | ICD-10-CM

## 2023-11-27 DIAGNOSIS — N39.41 URGE INCONTINENCE: ICD-10-CM

## 2023-11-27 DIAGNOSIS — D50.9 IRON DEFICIENCY ANEMIA, UNSPECIFIED IRON DEFICIENCY ANEMIA TYPE: ICD-10-CM

## 2023-11-27 PROCEDURE — 99213 OFFICE O/P EST LOW 20 MIN: CPT | Performed by: FAMILY MEDICINE

## 2023-11-27 RX ORDER — METOPROLOL TARTRATE 100 MG/1
100 TABLET ORAL 2 TIMES DAILY
Qty: 180 TABLET | Refills: 0 | Status: SHIPPED | OUTPATIENT
Start: 2023-11-27

## 2023-11-27 RX ORDER — SODIUM CHLORIDE 9 MG/ML
20 INJECTION, SOLUTION INTRAVENOUS ONCE
OUTPATIENT
Start: 2023-11-29

## 2023-11-27 RX ORDER — HYDROCHLOROTHIAZIDE 12.5 MG/1
12.5 TABLET ORAL DAILY
Qty: 90 TABLET | Refills: 0 | Status: SHIPPED | OUTPATIENT
Start: 2023-11-27 | End: 2024-11-21

## 2023-11-27 NOTE — ASSESSMENT & PLAN NOTE
Reviewed patient's blood pressure 164/90.    Recommended to follow-up with her pharmacy for a blood pressure kit, sent in on prior appt  Tried amlodipine in the past and has worked for her, but had pedal edema bilaterally as side effect  Continue losartan 100 mg daily  Metoprolol 100 mg twice daily  Take Lasix 20 mg 3 times daily for pedal edema  Started on hydrochlorothiazide 12.5 mg daily  Monitor home BP  DASH Diet

## 2023-11-27 NOTE — ASSESSMENT & PLAN NOTE
Hemoglobin 10.4 recently with low iron stores. Started on oral iron every other day and pica resolved. Iron infusion ordered, patient has been having trouble scheduling    History of gastric ulcer requiring 2 PRBCs, also history of gastric bypass about 11 years ago. Gastric ulcer was after gastric bypass  Denies any obvious signs of bleeding. Does report having a negative Cologuard within the past year. However no colonoscopy. Recommend follow up with GI - for EGD/colonoscopy     Reordered iron infusions as patient does not tolerate the oral iron well.   Given the infusion center phone number and encouraged to call later today

## 2023-11-27 NOTE — PROGRESS NOTES
[facility] Office visit    Assessment/Plan:     1. Essential hypertension  Assessment & Plan:  Reviewed patient's blood pressure 164/90. Recommended to follow-up with her pharmacy for a blood pressure kit, sent in on prior appt  Tried amlodipine in the past and has worked for her, but had pedal edema bilaterally as side effect  Continue losartan 100 mg daily  Metoprolol 100 mg twice daily  Take Lasix 20 mg 3 times daily for pedal edema  Started on hydrochlorothiazide 12.5 mg daily  Monitor home BP  DASH Diet    Orders:  -     metoprolol tartrate (LOPRESSOR) 100 mg tablet; Take 1 tablet (100 mg total) by mouth 2 (two) times a day  -     hydrochlorothiazide (HYDRODIURIL) 12.5 mg tablet; Take 1 tablet (12.5 mg total) by mouth daily    2. Iron deficiency anemia, unspecified iron deficiency anemia type  Assessment & Plan:  Hemoglobin 10.4 recently with low iron stores. Started on oral iron every other day and pica resolved. Iron infusion ordered, patient has been having trouble scheduling    History of gastric ulcer requiring 2 PRBCs, also history of gastric bypass about 11 years ago. Gastric ulcer was after gastric bypass  Denies any obvious signs of bleeding. Does report having a negative Cologuard within the past year. However no colonoscopy. Recommend follow up with GI - for EGD/colonoscopy     Reordered iron infusions as patient does not tolerate the oral iron well. Given the infusion center phone number and encouraged to call later today      3. Urge incontinence  Assessment & Plan:  Urge incontinence, progressively worsening  Previously seen a urologist and was told "bladder has dropped." Has had a procedure to help with urge incontinence but forgot what it's called. Has not seen GYN in 15 years  Referred to GYN, might benefit from pessary? Orders:  -     Ambulatory Referral to Gynecology; Future       Return in about 3 weeks (around 12/18/2023) for AWV.      Subjective:   HPI  Candy Jay Hawk is a 72 y.o. female follow-up for blood pressure. She does not have a BP cuff at home. She is taking losartan 100 mg and metoprolol 100 mg twice daily. She usually does not take the Lasix due to worsening of urge incontinence. She wears pull-up at night. As soon as she lays down, she has to urge to urinate. Last time she saw a gynecologist was 15 years ago. She has seen urologist previously and was told her bladder has dropped. She reports feeling tired. Iron panel was low and previously was told to schedule for iron infusion. She has been having trouble scheduling for iron infusion with the center. Has history of 7 iron transfusion and afterwards feel better. She was taking oral iron pill without improvement. She saw GI specialist, who recommended EGD and colonoscopy for anemia. But she has not followed up with them. Review of Systems   Constitutional:  Positive for fatigue. Negative for chills and fever. HENT:  Negative for ear pain and sore throat. Eyes:  Negative for pain and visual disturbance. Respiratory:  Negative for cough and shortness of breath. Cardiovascular:  Negative for chest pain and palpitations. Gastrointestinal:  Negative for abdominal pain and vomiting. Genitourinary:  Positive for urgency. Negative for dysuria and hematuria. Musculoskeletal:  Negative for arthralgias and back pain. Skin:  Negative for color change and rash. Neurological:  Negative for seizures and syncope. All other systems reviewed and are negative. Objective:     /90 (BP Location: Left arm, Patient Position: Sitting, Cuff Size: Large)   Pulse 65   Temp 98 °F (36.7 °C) (Temporal)   Resp 21   Ht 5' 6.5" (1.689 m)   Wt 123 kg (270 lb 3 oz)   SpO2 99%   BMI 42.96 kg/m²      Physical Exam  Vitals reviewed. Constitutional:       General: She is not in acute distress. Appearance: Normal appearance. HENT:      Head: Normocephalic and atraumatic.    Eyes: Pupils: Pupils are equal, round, and reactive to light. Cardiovascular:      Rate and Rhythm: Normal rate and regular rhythm. Pulses: Normal pulses. Heart sounds: Normal heart sounds. No murmur heard. Pulmonary:      Effort: Pulmonary effort is normal. No respiratory distress. Breath sounds: Normal breath sounds. No wheezing. Abdominal:      Palpations: Abdomen is soft. Musculoskeletal:      Right lower leg: Edema present. Left lower leg: Edema present. Skin:     General: Skin is warm and dry. Neurological:      General: No focal deficit present. Mental Status: She is alert and oriented to person, place, and time.    Psychiatric:         Mood and Affect: Mood normal.         Behavior: Behavior normal.          ** Please Note: This note has been constructed using a voice recognition system **     [address] Ananth Molina MD  11/27/23  3:07 PM

## 2023-11-27 NOTE — ASSESSMENT & PLAN NOTE
Urge incontinence, progressively worsening  Previously seen a urologist and was told "bladder has dropped." Has had a procedure to help with urge incontinence but forgot what it's called. Has not seen GYN in 15 years  Referred to GYN, might benefit from pessary?

## 2023-11-29 ENCOUNTER — TELEPHONE (OUTPATIENT)
Age: 73
End: 2023-11-29

## 2023-11-29 NOTE — TELEPHONE ENCOUNTER
Patient calling to reschedule her colonoscopy and EGD. Procedures have been rescheduled for 12/22/23 with Bryan Sloan at [facility]. Patient has prep information.

## 2023-12-06 ENCOUNTER — TELEPHONE (OUTPATIENT)
Dept: FAMILY MEDICINE CLINIC | Facility: CLINIC | Age: 73
End: 2023-12-06

## 2023-12-08 ENCOUNTER — ANESTHESIA EVENT (OUTPATIENT)
Dept: ANESTHESIOLOGY | Facility: HOSPITAL | Age: 73
End: 2023-12-08

## 2023-12-08 ENCOUNTER — ANESTHESIA (OUTPATIENT)
Dept: ANESTHESIOLOGY | Facility: HOSPITAL | Age: 73
End: 2023-12-08

## 2023-12-13 ENCOUNTER — HOSPITAL ENCOUNTER (OUTPATIENT)
Dept: INFUSION CENTER | Facility: HOSPITAL | Age: 73
Discharge: HOME | End: 2023-12-13
Payer: COMMERCIAL

## 2023-12-13 VITALS
DIASTOLIC BLOOD PRESSURE: 77 MMHG | OXYGEN SATURATION: 90 % | TEMPERATURE: 98.2 F | SYSTOLIC BLOOD PRESSURE: 169 MMHG | RESPIRATION RATE: 20 BRPM | HEART RATE: 75 BPM

## 2023-12-13 DIAGNOSIS — D50.8 IRON DEFICIENCY ANEMIA SECONDARY TO INADEQUATE DIETARY IRON INTAKE: Primary | ICD-10-CM

## 2023-12-13 RX ORDER — SODIUM CHLORIDE 9 MG/ML
20 INJECTION, SOLUTION INTRAVENOUS ONCE
Status: CANCELLED | OUTPATIENT
Start: 2023-12-20

## 2023-12-13 RX ORDER — SODIUM CHLORIDE 9 MG/ML
20 INJECTION, SOLUTION INTRAVENOUS ONCE
Status: COMPLETED | OUTPATIENT
Start: 2023-12-13 | End: 2023-12-13

## 2023-12-13 RX ADMIN — SODIUM CHLORIDE 20 ML/HR: 0.9 INJECTION, SOLUTION INTRAVENOUS at 13:11

## 2023-12-13 RX ADMIN — IRON SUCROSE 200 MG: 20 INJECTION, SOLUTION INTRAVENOUS at 13:12

## 2023-12-13 NOTE — PROGRESS NOTES
Janellneena Ezequiel  tolerated treatment well with no complications. Tamiko Osman is aware of future appt on 12/20/23 at 1130.  Appointment calendar printed and given to Tamiko Osman:

## 2023-12-19 ENCOUNTER — HOSPITAL ENCOUNTER (OUTPATIENT)
Dept: INFUSION CENTER | Facility: HOSPITAL | Age: 73
Discharge: HOME | End: 2023-12-19

## 2023-12-20 ENCOUNTER — OFFICE VISIT (OUTPATIENT)
Dept: FAMILY MEDICINE CLINIC | Facility: CLINIC | Age: 73
End: 2023-12-20
Payer: COMMERCIAL

## 2023-12-20 ENCOUNTER — HOSPITAL ENCOUNTER (OUTPATIENT)
Dept: INFUSION CENTER | Facility: HOSPITAL | Age: 73
Discharge: HOME | End: 2023-12-20
Payer: COMMERCIAL

## 2023-12-20 VITALS
SYSTOLIC BLOOD PRESSURE: 144 MMHG | DIASTOLIC BLOOD PRESSURE: 82 MMHG | RESPIRATION RATE: 21 BRPM | HEIGHT: 67 IN | TEMPERATURE: 97.9 F | BODY MASS INDEX: 42.27 KG/M2 | OXYGEN SATURATION: 97 % | WEIGHT: 269.31 LBS | HEART RATE: 70 BPM

## 2023-12-20 VITALS
TEMPERATURE: 98.1 F | RESPIRATION RATE: 20 BRPM | SYSTOLIC BLOOD PRESSURE: 167 MMHG | DIASTOLIC BLOOD PRESSURE: 76 MMHG | HEART RATE: 57 BPM

## 2023-12-20 DIAGNOSIS — I10 ESSENTIAL HYPERTENSION: ICD-10-CM

## 2023-12-20 DIAGNOSIS — D50.9 IRON DEFICIENCY ANEMIA, UNSPECIFIED IRON DEFICIENCY ANEMIA TYPE: ICD-10-CM

## 2023-12-20 DIAGNOSIS — K21.00 GASTROESOPHAGEAL REFLUX DISEASE WITH ESOPHAGITIS WITHOUT HEMORRHAGE: ICD-10-CM

## 2023-12-20 DIAGNOSIS — F32.A ANXIETY AND DEPRESSION: ICD-10-CM

## 2023-12-20 DIAGNOSIS — B00.1 COLD SORE: ICD-10-CM

## 2023-12-20 DIAGNOSIS — F41.9 ANXIETY AND DEPRESSION: ICD-10-CM

## 2023-12-20 DIAGNOSIS — D50.8 IRON DEFICIENCY ANEMIA SECONDARY TO INADEQUATE DIETARY IRON INTAKE: Primary | ICD-10-CM

## 2023-12-20 DIAGNOSIS — G89.4 CHRONIC PAIN SYNDROME: ICD-10-CM

## 2023-12-20 DIAGNOSIS — Z00.00 MEDICARE ANNUAL WELLNESS VISIT, SUBSEQUENT: Primary | ICD-10-CM

## 2023-12-20 DIAGNOSIS — Z87.11 HISTORY OF GASTRIC ULCER: ICD-10-CM

## 2023-12-20 PROBLEM — N20.0 KIDNEY STONE: Status: RESOLVED | Noted: 2022-09-03 | Resolved: 2023-12-20

## 2023-12-20 PROCEDURE — 96365 THER/PROPH/DIAG IV INF INIT: CPT

## 2023-12-20 PROCEDURE — G0439 PPPS, SUBSEQ VISIT: HCPCS | Performed by: FAMILY MEDICINE

## 2023-12-20 RX ORDER — CITALOPRAM 40 MG/1
40 TABLET ORAL DAILY
Qty: 90 TABLET | Refills: 1 | Status: SHIPPED | OUTPATIENT
Start: 2023-12-20

## 2023-12-20 RX ORDER — PANTOPRAZOLE SODIUM 40 MG/1
40 TABLET, DELAYED RELEASE ORAL DAILY
Qty: 30 TABLET | Refills: 0 | Status: SHIPPED | OUTPATIENT
Start: 2023-12-20

## 2023-12-20 RX ORDER — CYCLOBENZAPRINE HCL 10 MG
10 TABLET ORAL
Qty: 90 TABLET | Refills: 0 | Status: SHIPPED | OUTPATIENT
Start: 2023-12-20

## 2023-12-20 RX ORDER — DULOXETIN HYDROCHLORIDE 30 MG/1
30 CAPSULE, DELAYED RELEASE ORAL DAILY
Qty: 90 CAPSULE | Refills: 0 | Status: SHIPPED | OUTPATIENT
Start: 2023-12-20

## 2023-12-20 RX ORDER — HYDROCHLOROTHIAZIDE 12.5 MG/1
12.5 TABLET ORAL DAILY
Qty: 90 TABLET | Refills: 0 | Status: SHIPPED | OUTPATIENT
Start: 2023-12-20 | End: 2024-12-14

## 2023-12-20 RX ORDER — LOSARTAN POTASSIUM 100 MG/1
100 TABLET ORAL DAILY
Qty: 90 TABLET | Refills: 1 | Status: SHIPPED | OUTPATIENT
Start: 2023-12-20

## 2023-12-20 RX ORDER — SODIUM CHLORIDE 9 MG/ML
20 INJECTION, SOLUTION INTRAVENOUS ONCE
Status: COMPLETED | OUTPATIENT
Start: 2023-12-20 | End: 2023-12-20

## 2023-12-20 RX ORDER — SODIUM CHLORIDE 9 MG/ML
20 INJECTION, SOLUTION INTRAVENOUS ONCE
Status: CANCELLED | OUTPATIENT
Start: 2023-12-27

## 2023-12-20 RX ORDER — ACYCLOVIR 800 MG/1
800 TABLET ORAL 2 TIMES DAILY
Qty: 10 TABLET | Refills: 2 | Status: SHIPPED | OUTPATIENT
Start: 2023-12-20 | End: 2024-01-04

## 2023-12-20 RX ORDER — METOPROLOL TARTRATE 100 MG/1
100 TABLET ORAL 2 TIMES DAILY
Qty: 180 TABLET | Refills: 0 | Status: SHIPPED | OUTPATIENT
Start: 2023-12-20

## 2023-12-20 RX ADMIN — IRON SUCROSE 200 MG: 20 INJECTION, SOLUTION INTRAVENOUS at 11:57

## 2023-12-20 RX ADMIN — SODIUM CHLORIDE 20 ML/HR: 0.9 INJECTION, SOLUTION INTRAVENOUS at 11:57

## 2023-12-20 NOTE — PROGRESS NOTES
Karin Nieves  tolerated treatment well with no complications.      Karin Nieves is aware of future appt on 12/27/23 at 1300.     AVS printed and given to Karin Nieves:  Yes x

## 2023-12-20 NOTE — PATIENT INSTRUCTIONS

## 2023-12-20 NOTE — PROGRESS NOTES
Assessment and Plan:     Problem List Items Addressed This Visit          Digestive    Gastro-esophageal reflux disease with esophagitis    Relevant Medications    pantoprazole (PROTONIX) 40 mg tablet       Cardiovascular and Mediastinum    Essential hypertension    Relevant Medications    metoprolol tartrate (LOPRESSOR) 100 mg tablet    losartan (COZAAR) 100 MG tablet    hydrochlorothiazide (HYDRODIURIL) 12.5 mg tablet       Other    Chronic pain syndrome    Relevant Medications    DULoxetine (Cymbalta) 30 mg delayed release capsule    cyclobenzaprine (FLEXERIL) 10 mg tablet    Iron deficiency anemia    Relevant Medications    pantoprazole (PROTONIX) 40 mg tablet    History of gastric ulcer    Relevant Medications    pantoprazole (PROTONIX) 40 mg tablet     Other Visit Diagnoses       Medicare annual wellness visit, subsequent    -  Primary    Anxiety and depression        chronic, stable. medication refill    Relevant Medications    DULoxetine (Cymbalta) 30 mg delayed release capsule    citalopram (CeleXA) 40 mg tablet    Cold sore        acute, present for 1 day. Prescribed acyclovir    Relevant Medications    acyclovir (ZOVIRAX) 800 mg tablet            Falls Plan of Care: balance, strength, and gait training instructions were provided.       Preventive health issues were discussed with patient, and age appropriate screening tests were ordered as noted in patient's After Visit Summary.  Personalized health advice and appropriate referrals for health education or preventive services given if needed, as noted in patient's After Visit Summary.     History of Present Illness:     Patient presents for a Medicare Wellness Visit    Pleasant 73-year-old presents the clinic for an AWV.She notes that she doesn't have any complaints except for incontinence for which she sees OBGYN. Additionally, she id currently receiving Venofer infusions for Iron deficiency anemia. She needs to follow up with GI for EGD and  colonoscopy       Patient Care Team:  Debby Dawn MD as PCP - General (Family Medicine)  Radha Estrada MD as PCP - PCP-[facility] ([facility])  MD Surendra Narvaez MD Chatargy Kaza, MD as Endoscopist     Review of Systems:     Review of Systems   Constitutional:  Negative for chills and fever.   HENT:  Negative for ear pain and sore throat.    Eyes:  Positive for visual disturbance. Negative for pain.   Respiratory:  Negative for cough and shortness of breath.    Cardiovascular:  Negative for chest pain and palpitations.   Gastrointestinal:  Negative for abdominal pain and vomiting.   Genitourinary:  Negative for dysuria and hematuria.        Urinary incontinence which she follows with OBGYN   Musculoskeletal:  Negative for arthralgias and back pain.   Skin:  Negative for color change and rash.   Neurological:  Negative for seizures and syncope.   All other systems reviewed and are negative.       Problem List:     Patient Active Problem List   Diagnosis    Other specified anemias    Chronic pain syndrome    Low back pain    Radiculopathy of lumbar region    Spinal stenosis of lumbar region    Other intervertebral disc degeneration, lumbar region    Chronic bilateral low back pain    Essential hypertension    History of gastric bypass    Morbid obesity with BMI of 40.0-44.9, adult ([facility])    Hyperglycemia    Cerebrovascular accident (CVA) due to embolism of cerebral artery ([facility])    Chronic diastolic CHF (congestive heart failure) ([facility])    Cardiomegaly    Anxiety disorder    Transient ischemic attack    Gastro-esophageal reflux disease with esophagitis    Iron deficiency anemia    Recurrent major depressive disorder, in full remission ([facility])    History of anemia    Pre-diabetes    H/O bariatric surgery    History of gastric ulcer    Urge incontinence      Past Medical and Surgical History:     Past Medical History:   Diagnosis Date    Anemia     Anxiety disorder     Arthritis     Blood  disorder     Bronchitis     Chronic pain     Depression     Dizziness     GERD (gastroesophageal reflux disease)     History of transfusion     Hypertension     Kidney stone     Kidney stones     Lightheadedness     Obesity     SOB (shortness of breath)     Stroke (HCC)      Past Surgical History:   Procedure Laterality Date    ABDOMINAL SURGERY      APPENDECTOMY      CHOLECYSTECTOMY      EGD AND COLONOSCOPY N/A 07/11/2016    Procedure: EGD AND COLONOSCOPY;  Surgeon: Yousif Lord MD;  Location: [facility] GI LAB;  Service:     EPIDURAL BLOCK INJECTION Right 07/13/2017    Procedure: BLOCK / INJECTION EPIDURAL STEROID TRANSFORAMINAL  L4-5;  Surgeon: Surendra Siu MD;  Location: [facility] MAIN OR;  Service: Pain Management     EPIDURAL BLOCK INJECTION Right 08/09/2017    Procedure: BLOCK / INJECTION EPIDURAL STEROID TRANSFORAMINAL L4-5- REPEAT;  Surgeon: Surendra Siu MD;  Location: [facility] MAIN OR;  Service: Pain Management     EPIDURAL BLOCK INJECTION Left 02/16/2018    Procedure: LEFT L4 AND L5 TRANSFORAMINAL EPIDURAL STEROID INJECTION;  Surgeon: Surendra Siu MD;  Location: [facility] MAIN OR;  Service: Pain Management     EYE SURGERY      GASTRIC BYPASS      MAMMO (HISTORICAL) Bilateral 05/06/2021    SC CYSTO/URETERO W/LITHOTRIPSY &INDWELL STENT INSRT Left 07/13/2016    Procedure: CYSTOSCOPY URETEROSCOPY WITH LITHOTRIPSY HOLMIUM LASER, RETROGRADE PYELOGRAM AND INSERTION STENT URETERAL;  Surgeon: Etienne Freeman MD;  Location: [facility] MAIN OR;  Service: Urology    TONSILLECTOMY        Family History:     Family History   Problem Relation Age of Onset    No Known Problems Maternal Aunt     No Known Problems Maternal Aunt     No Known Problems Paternal Aunt     No Known Problems Paternal Aunt     No Known Problems Paternal Aunt     No Known Problems Daughter     No Known Problems Daughter     Diabetes Family     Heart disease Family         coronary arteriosclerosis      Social History:     Social History     Socioeconomic  History    Marital status:      Spouse name: None    Number of children: None    Years of education: None    Highest education level: None   Occupational History    None   Tobacco Use    Smoking status: Never    Smokeless tobacco: Never   Vaping Use    Vaping status: Never Used   Substance and Sexual Activity    Alcohol use: Never    Drug use: No    Sexual activity: Yes     Partners: Male     Birth control/protection: Post-menopausal, Inserts   Other Topics Concern    None   Social History Narrative    None     Social Determinants of Health     Financial Resource Strain: Medium Risk (12/20/2023)    Overall Financial Resource Strain (CARDIA)     Difficulty of Paying Living Expenses: Somewhat hard   Food Insecurity: Not on file   Transportation Needs: Unmet Transportation Needs (12/20/2023)    PRAPARE - Transportation     Lack of Transportation (Medical): Yes     Lack of Transportation (Non-Medical): No   Physical Activity: Not on file   Stress: Not on file   Social Connections: Not on file   Intimate Partner Violence: Not on file   Housing Stability: Not on file      Medications and Allergies:     Current Outpatient Medications   Medication Sig Dispense Refill    acyclovir (ZOVIRAX) 800 mg tablet Take 1 tablet (800 mg total) by mouth 2 (two) times a day for 15 days 10 tablet 2    calcium carbonate-vitamin D 500 mg-5 mcg per tablet Take 1 tablet by mouth daily with breakfast 30 tablet 2    citalopram (CeleXA) 40 mg tablet Take 1 tablet (40 mg total) by mouth daily 90 tablet 1    cyclobenzaprine (FLEXERIL) 10 mg tablet Take 1 tablet (10 mg total) by mouth daily at bedtime 90 tablet 0    DULoxetine (Cymbalta) 30 mg delayed release capsule Take 1 capsule (30 mg total) by mouth daily 90 capsule 0    furosemide (LASIX) 20 mg tablet Take 1 tablet (20 mg total) by mouth daily As needed 90 tablet 1    hydrochlorothiazide (HYDRODIURIL) 12.5 mg tablet Take 1 tablet (12.5 mg total) by mouth daily 90 tablet 0    losartan  (COZAAR) 100 MG tablet Take 1 tablet (100 mg total) by mouth daily 90 tablet 1    metoprolol tartrate (LOPRESSOR) 100 mg tablet Take 1 tablet (100 mg total) by mouth 2 (two) times a day 180 tablet 0    pantoprazole (PROTONIX) 40 mg tablet Take 1 tablet (40 mg total) by mouth daily 30 tablet 0     No current facility-administered medications for this visit.     Allergies   Allergen Reactions    Vicodin [Hydrocodone-Acetaminophen] GI Intolerance    Amlodipine Swelling     Leg swelling      Immunizations:     Immunization History   Administered Date(s) Administered    COVID-19 MODERNA VACC 0.5 ML IM 03/03/2021, 04/01/2021    INFLUENZA 09/11/2023    Influenza Split High Dose Preservative Free IM 10/24/2016, 10/10/2017    Influenza, seasonal, injectable 11/05/1999, 12/04/2000, 01/22/2002, 10/27/2003, 01/20/2005, 10/19/2005, 11/17/2006    Pneumococcal 09/11/2023    Pneumococcal Polysaccharide PPV23 11/05/1999, 10/19/2005    Zoster Vaccine Recombinant 09/11/2023      Health Maintenance:         Topic Date Due    Breast Cancer Screening: Mammogram  09/06/2024    Colorectal Cancer Screening  07/11/2026    Hepatitis C Screening  Completed         Topic Date Due    COVID-19 Vaccine (3 - 2023-24 season) 09/01/2023      Medicare Screening Tests and Risk Assessments:     Karin is here for her Subsequent Wellness visit.     Health Risk Assessment:   Patient rates overall health as good. Patient feels that their physical health rating is slightly worse. Patient is satisfied with their life. Eyesight was rated as same. Hearing was rated as same. Patient feels that their emotional and mental health rating is slightly better. Patients states they are sometimes angry. Patient states they are always unusually tired/fatigued. Pain experienced in the last 7 days has been some. Patient's pain rating has been 5/10. Patient states that she has experienced no weight loss or gain in last 6 months.     Depression Screening:   PHQ-9 Score: 15       Fall Risk Screening:   In the past year, patient has experienced: history of falling in past year    Number of falls: 2 or more  Injured during fall?: No    Feels unsteady when standing or walking?: Yes    Worried about falling?: No      Urinary Incontinence Screening:   Patient has leaked urine accidently in the last six months.     Home Safety:  Patient has trouble with stairs inside or outside of their home. Patient has working smoke alarms and has working carbon monoxide detector. Home safety hazards include: none.     Nutrition:   Current diet is No Added Salt.     Medications:   Patient is currently taking over-the-counter supplements. OTC medications include: see medication list. Patient is able to manage medications.     Activities of Daily Living (ADLs)/Instrumental Activities of Daily Living (IADLs):   Walk and transfer into and out of bed and chair?: Yes  Dress and groom yourself?: Yes    Bathe or shower yourself?: Yes    Feed yourself? Yes  Do your laundry/housekeeping?: Yes  Manage your money, pay your bills and track your expenses?: Yes  Make your own meals?: Yes    Do your own shopping?: Yes    Previous Hospitalizations:   Any hospitalizations or ED visits within the last 12 months?: No      PREVENTIVE SCREENINGS      Cardiovascular Screening:    General: Screening Current      Diabetes Screening:     General: Screening Current      Colorectal Cancer Screening:     General: Screening Current      Breast Cancer Screening:     General: Screening Current      Cervical Cancer Screening:    General: Screening Not Indicated      Lung Cancer Screening:     General: Screening Not Indicated      Hepatitis C Screening:    General: Screening Current    Screening, Brief Intervention, and Referral to Treatment (SBIRT)    Screening  Typical number of drinks in a day: 0  Typical number of drinks in a week: 0  Interpretation: Low risk drinking behavior.    Single Item Drug Screening:  How often have you used an  "illegal drug (including marijuana) or a prescription medication for non-medical reasons in the past year? never    Single Item Drug Screen Score: 0  Interpretation: Negative screen for possible drug use disorder    No results found.     Physical Exam:     /82 (BP Location: Left arm, Patient Position: Sitting, Cuff Size: Large)   Pulse 70   Temp 97.9 °F (36.6 °C) (Temporal)   Resp 21   Ht 5' 6.5\" (1.689 m)   Wt 122 kg (269 lb 5 oz)   SpO2 97%   BMI 42.82 kg/m²     Physical Exam  Vitals and nursing note reviewed.   Constitutional:       General: She is not in acute distress.     Appearance: She is well-developed.   HENT:      Head: Normocephalic and atraumatic.      Right Ear: Tympanic membrane, ear canal and external ear normal.      Left Ear: Tympanic membrane, ear canal and external ear normal.      Nose: Nose normal.      Mouth/Throat:      Mouth: Mucous membranes are moist.      Pharynx: Oropharynx is clear.      Comments: Lesion present on top, middle lip  Eyes:      Conjunctiva/sclera: Conjunctivae normal.   Cardiovascular:      Rate and Rhythm: Normal rate and regular rhythm.      Pulses: Normal pulses.      Heart sounds: No murmur heard.  Pulmonary:      Effort: Pulmonary effort is normal. No respiratory distress.      Breath sounds: Normal breath sounds.   Abdominal:      Palpations: Abdomen is soft.      Tenderness: There is no abdominal tenderness.   Musculoskeletal:         General: No swelling.      Cervical back: Normal range of motion and neck supple.   Skin:     General: Skin is warm and dry.      Capillary Refill: Capillary refill takes less than 2 seconds.   Neurological:      General: No focal deficit present.      Mental Status: She is alert and oriented to person, place, and time.   Psychiatric:         Mood and Affect: Mood normal.         Behavior: Behavior normal.         Thought Content: Thought content normal.         Judgment: Judgment normal.          Cassidy Sellers, " MD

## 2023-12-22 ENCOUNTER — ANESTHESIA EVENT (OUTPATIENT)
Dept: GASTROENTEROLOGY | Facility: AMBULARY SURGERY CENTER | Age: 73
End: 2023-12-22

## 2023-12-22 ENCOUNTER — ANESTHESIA (OUTPATIENT)
Dept: GASTROENTEROLOGY | Facility: AMBULARY SURGERY CENTER | Age: 73
End: 2023-12-22

## 2023-12-22 ENCOUNTER — HOSPITAL ENCOUNTER (OUTPATIENT)
Dept: GASTROENTEROLOGY | Facility: AMBULARY SURGERY CENTER | Age: 73
Setting detail: OUTPATIENT SURGERY
End: 2023-12-22
Attending: INTERNAL MEDICINE
Payer: COMMERCIAL

## 2023-12-22 VITALS
TEMPERATURE: 97.4 F | OXYGEN SATURATION: 100 % | HEART RATE: 50 BPM | RESPIRATION RATE: 16 BRPM | DIASTOLIC BLOOD PRESSURE: 66 MMHG | SYSTOLIC BLOOD PRESSURE: 133 MMHG

## 2023-12-22 DIAGNOSIS — D50.9 IRON DEFICIENCY ANEMIA, UNSPECIFIED IRON DEFICIENCY ANEMIA TYPE: ICD-10-CM

## 2023-12-22 DIAGNOSIS — Z87.11 HISTORY OF GASTRIC ULCER: ICD-10-CM

## 2023-12-22 PROBLEM — K21.9 GASTROESOPHAGEAL REFLUX DISEASE: Status: ACTIVE | Noted: 2023-12-22

## 2023-12-22 PROCEDURE — 88305 TISSUE EXAM BY PATHOLOGIST: CPT | Performed by: PATHOLOGY

## 2023-12-22 PROCEDURE — 43235 EGD DIAGNOSTIC BRUSH WASH: CPT | Performed by: INTERNAL MEDICINE

## 2023-12-22 PROCEDURE — 45385 COLONOSCOPY W/LESION REMOVAL: CPT | Performed by: INTERNAL MEDICINE

## 2023-12-22 RX ORDER — SODIUM CHLORIDE, SODIUM LACTATE, POTASSIUM CHLORIDE, CALCIUM CHLORIDE 600; 310; 30; 20 MG/100ML; MG/100ML; MG/100ML; MG/100ML
75 INJECTION, SOLUTION INTRAVENOUS CONTINUOUS
Status: DISCONTINUED | OUTPATIENT
Start: 2023-12-22 | End: 2023-12-26 | Stop reason: HOSPADM

## 2023-12-22 RX ORDER — PROPOFOL 10 MG/ML
INJECTION, EMULSION INTRAVENOUS AS NEEDED
Status: DISCONTINUED | OUTPATIENT
Start: 2023-12-22 | End: 2023-12-22

## 2023-12-22 RX ORDER — PROPOFOL 10 MG/ML
INJECTION, EMULSION INTRAVENOUS CONTINUOUS PRN
Status: DISCONTINUED | OUTPATIENT
Start: 2023-12-22 | End: 2023-12-22

## 2023-12-22 RX ORDER — LIDOCAINE HYDROCHLORIDE 20 MG/ML
INJECTION, SOLUTION EPIDURAL; INFILTRATION; INTRACAUDAL; PERINEURAL AS NEEDED
Status: DISCONTINUED | OUTPATIENT
Start: 2023-12-22 | End: 2023-12-22

## 2023-12-22 RX ADMIN — PROPOFOL 50 MG: 10 INJECTION, EMULSION INTRAVENOUS at 11:32

## 2023-12-22 RX ADMIN — PROPOFOL 50 MG: 10 INJECTION, EMULSION INTRAVENOUS at 11:30

## 2023-12-22 RX ADMIN — PROPOFOL 50 MG: 10 INJECTION, EMULSION INTRAVENOUS at 11:27

## 2023-12-22 RX ADMIN — SODIUM CHLORIDE, SODIUM LACTATE, POTASSIUM CHLORIDE, AND CALCIUM CHLORIDE: .6; .31; .03; .02 INJECTION, SOLUTION INTRAVENOUS at 11:24

## 2023-12-22 RX ADMIN — PROPOFOL 140 MCG/KG/MIN: 10 INJECTION, EMULSION INTRAVENOUS at 11:32

## 2023-12-22 RX ADMIN — LIDOCAINE HYDROCHLORIDE 100 MG: 20 INJECTION, SOLUTION EPIDURAL; INFILTRATION; INTRACAUDAL; PERINEURAL at 11:26

## 2023-12-22 RX ADMIN — PROPOFOL 100 MG: 10 INJECTION, EMULSION INTRAVENOUS at 11:26

## 2023-12-22 RX ADMIN — PROPOFOL 50 MG: 10 INJECTION, EMULSION INTRAVENOUS at 11:28

## 2023-12-22 NOTE — H&P
History and Physical - [facility]  Karin Nieves 73 y.o. female MRN: [id number]        HPI: 73-year-old female with history of GERD and iron deficiency anemia.    Historical Information   Past Medical History:   Diagnosis Date    Anemia     Anxiety disorder     Arthritis     Blood disorder     Bronchitis     Chronic pain     Depression     Dizziness     GERD (gastroesophageal reflux disease)     History of transfusion     Hypertension     Kidney stone     Kidney stones     Lightheadedness     Obesity     SOB (shortness of breath)     Stroke (HCC)      Past Surgical History:   Procedure Laterality Date    ABDOMINAL SURGERY      APPENDECTOMY      CHOLECYSTECTOMY      EGD AND COLONOSCOPY N/A 07/11/2016    Procedure: EGD AND COLONOSCOPY;  Surgeon: Yousif Lord MD;  Location: [facility] GI LAB;  Service:     EPIDURAL BLOCK INJECTION Right 07/13/2017    Procedure: BLOCK / INJECTION EPIDURAL STEROID TRANSFORAMINAL  L4-5;  Surgeon: Surendra Siu MD;  Location: [facility] MAIN OR;  Service: Pain Management     EPIDURAL BLOCK INJECTION Right 08/09/2017    Procedure: BLOCK / INJECTION EPIDURAL STEROID TRANSFORAMINAL L4-5- REPEAT;  Surgeon: Surendra Siu MD;  Location: [facility] MAIN OR;  Service: Pain Management     EPIDURAL BLOCK INJECTION Left 02/16/2018    Procedure: LEFT L4 AND L5 TRANSFORAMINAL EPIDURAL STEROID INJECTION;  Surgeon: Surendra Siu MD;  Location: [facility] MAIN OR;  Service: Pain Management     EYE SURGERY      GASTRIC BYPASS      MAMMO (HISTORICAL) Bilateral 05/06/2021    AK CYSTO/URETERO W/LITHOTRIPSY &INDWELL STENT INSRT Left 07/13/2016    Procedure: CYSTOSCOPY URETEROSCOPY WITH LITHOTRIPSY HOLMIUM LASER, RETROGRADE PYELOGRAM AND INSERTION STENT URETERAL;  Surgeon: Etienne Freeman MD;  Location: [facility] MAIN OR;  Service: Urology    TONSILLECTOMY       Social History   Social History     Substance and Sexual Activity   Alcohol Use Never     Social History     Substance and Sexual Activity    Drug Use No     Social History     Tobacco Use   Smoking Status Never   Smokeless Tobacco Never     Family History   Problem Relation Age of Onset    No Known Problems Maternal Aunt     No Known Problems Maternal Aunt     No Known Problems Paternal Aunt     No Known Problems Paternal Aunt     No Known Problems Paternal Aunt     No Known Problems Daughter     No Known Problems Daughter     Diabetes Family     Heart disease Family         coronary arteriosclerosis       Meds/Allergies     (Not in a hospital admission)      Allergies   Allergen Reactions    Vicodin [Hydrocodone-Acetaminophen] GI Intolerance    Amlodipine Swelling     Leg swelling       Objective     Blood pressure (!) 173/72, pulse (!) 54, temperature (!) 97.4 °F (36.3 °C), temperature source Temporal, resp. rate 18, SpO2 99%, not currently breastfeeding.    PHYSICAL EXAM:    Gen: NAD  CV: S1 & S2 normal, RRR  CHEST: Clear to auscultate  ABD: soft, NT/ND, good bowel sounds  EXT: no edema    ASSESSMENT:     GERD, iron deficiency anemia    PLAN:    EGD and colonoscopy

## 2023-12-22 NOTE — ANESTHESIA POSTPROCEDURE EVALUATION
Post-Op Assessment Note    CV Status:  Stable  Pain Score: 0    Pain management: adequate       Mental Status:  Sleepy   Hydration Status:  Stable   PONV Controlled:  None   Airway Patency:  Patent     Post Op Vitals Reviewed: Yes      Staff: Anesthesiologist, CRNA               BP   120/67   Temp      Pulse  70   Resp   14   SpO2   98

## 2023-12-22 NOTE — ANESTHESIA PREPROCEDURE EVALUATION
Procedure:  COLONOSCOPY  EGD    Relevant Problems   CARDIO   (+) Essential hypertension      GI/HEPATIC   (+) Gastroesophageal reflux disease   (+) H/O bariatric surgery - bypass      HEMATOLOGY   (+) Iron deficiency anemia   (+) Other specified anemias      MUSCULOSKELETAL   (+) Chronic bilateral low back pain   (+) Low back pain   (+) Other intervertebral disc degeneration, lumbar region      NEURO/PSYCH   (+) Anxiety disorder   (+) Cerebrovascular accident (CVA) due to embolism of cerebral artery (HCC)   (+) Chronic bilateral low back pain   (+) Chronic pain syndrome   (+) Recurrent major depressive disorder, in full remission (HCC)   (+) Transient ischemic attack      Other   (+) Pre-diabetes        Physical Exam    Airway    Mallampati score: II  TM Distance: >3 FB  Neck ROM: full     Dental    upper dentures    Cardiovascular  Rhythm: regular, Rate: normal    Pulmonary   Breath sounds clear to auscultation    Other Findings  post-pubertal.      Anesthesia Plan  ASA Score- 3     Anesthesia Type- IV sedation with anesthesia with ASA Monitors.         Additional Monitors:     Airway Plan:            Plan Factors-    Chart reviewed.        Patient is not a current smoker.              Induction- intravenous.    Postoperative Plan-     Informed Consent- Anesthetic plan and risks discussed with patient.  I personally reviewed this patient with the CRNA. Discussed and agreed on the Anesthesia Plan with the CRNA..

## 2023-12-27 ENCOUNTER — HOSPITAL ENCOUNTER (OUTPATIENT)
Dept: INFUSION CENTER | Facility: HOSPITAL | Age: 73
Discharge: HOME | End: 2023-12-27
Attending: FAMILY MEDICINE
Payer: COMMERCIAL

## 2023-12-27 VITALS
SYSTOLIC BLOOD PRESSURE: 150 MMHG | HEART RATE: 63 BPM | OXYGEN SATURATION: 100 % | DIASTOLIC BLOOD PRESSURE: 84 MMHG | TEMPERATURE: 96.8 F | RESPIRATION RATE: 18 BRPM

## 2023-12-27 DIAGNOSIS — D50.8 IRON DEFICIENCY ANEMIA SECONDARY TO INADEQUATE DIETARY IRON INTAKE: Primary | ICD-10-CM

## 2023-12-27 PROCEDURE — 88305 TISSUE EXAM BY PATHOLOGIST: CPT | Performed by: PATHOLOGY

## 2023-12-27 PROCEDURE — 96365 THER/PROPH/DIAG IV INF INIT: CPT

## 2023-12-27 RX ORDER — SODIUM CHLORIDE 9 MG/ML
20 INJECTION, SOLUTION INTRAVENOUS ONCE
Status: COMPLETED | OUTPATIENT
Start: 2023-12-27 | End: 2023-12-27

## 2023-12-27 RX ORDER — SODIUM CHLORIDE 9 MG/ML
20 INJECTION, SOLUTION INTRAVENOUS ONCE
Status: CANCELLED | OUTPATIENT
Start: 2024-01-03

## 2023-12-27 RX ADMIN — SODIUM CHLORIDE 20 ML/HR: 0.9 INJECTION, SOLUTION INTRAVENOUS at 13:35

## 2023-12-27 RX ADMIN — IRON SUCROSE 200 MG: 20 INJECTION, SOLUTION INTRAVENOUS at 13:35

## 2023-12-27 NOTE — PROGRESS NOTES
Patient tolerated venofer infusion well with no adverse effects. Offers no complaints. Next appointment verified, AVS declined.

## 2023-12-27 NOTE — PLAN OF CARE
Problem: Potential for Falls  Goal: Patient will remain free of falls  Description: INTERVENTIONS:  - Educate patient/family on patient safety including physical limitations  - Instruct patient to call for assistance with activity   - Consult OT/PT to assist with strengthening/mobility   - Keep Call bell within reach    Outcome: Progressing     Problem: Knowledge Deficit  Goal: Patient/family/caregiver demonstrates understanding of disease process, treatment plan, medications, and discharge instructions  Description: Complete learning assessment and assess knowledge base.  Interventions:  - Provide teaching at level of understanding  - Provide teaching via preferred learning methods  Outcome: Progressing

## 2024-01-03 ENCOUNTER — HOSPITAL ENCOUNTER (OUTPATIENT)
Dept: INFUSION CENTER | Facility: HOSPITAL | Age: 74
Discharge: HOME | End: 2024-01-03
Payer: COMMERCIAL

## 2024-01-03 VITALS
RESPIRATION RATE: 18 BRPM | OXYGEN SATURATION: 97 % | DIASTOLIC BLOOD PRESSURE: 72 MMHG | SYSTOLIC BLOOD PRESSURE: 146 MMHG | HEART RATE: 59 BPM | TEMPERATURE: 97.2 F

## 2024-01-03 DIAGNOSIS — D50.9 IRON DEFICIENCY ANEMIA, UNSPECIFIED IRON DEFICIENCY ANEMIA TYPE: ICD-10-CM

## 2024-01-03 DIAGNOSIS — D50.8 IRON DEFICIENCY ANEMIA SECONDARY TO INADEQUATE DIETARY IRON INTAKE: Primary | ICD-10-CM

## 2024-01-03 RX ORDER — SODIUM CHLORIDE 9 MG/ML
20 INJECTION, SOLUTION INTRAVENOUS ONCE
OUTPATIENT
Start: 2024-01-10

## 2024-01-03 RX ORDER — SODIUM CHLORIDE 9 MG/ML
20 INJECTION, SOLUTION INTRAVENOUS ONCE
Status: COMPLETED | OUTPATIENT
Start: 2024-01-03 | End: 2024-01-03

## 2024-01-03 RX ADMIN — IRON SUCROSE 200 MG: 20 INJECTION, SOLUTION INTRAVENOUS at 13:28

## 2024-01-03 RX ADMIN — SODIUM CHLORIDE 20 ML/HR: 9 INJECTION, SOLUTION INTRAVENOUS at 13:27

## 2024-01-10 ENCOUNTER — HOSPITAL ENCOUNTER (OUTPATIENT)
Dept: INFUSION CENTER | Facility: HOSPITAL | Age: 74
Discharge: HOME | End: 2024-01-10
Attending: FAMILY MEDICINE
Payer: COMMERCIAL

## 2024-01-10 ENCOUNTER — HOSPITAL ENCOUNTER (EMERGENCY)
Facility: HOSPITAL | Age: 74
Discharge: HOME | End: 2024-01-10
Attending: EMERGENCY MEDICINE
Payer: COMMERCIAL

## 2024-01-10 ENCOUNTER — APPOINTMENT (EMERGENCY)
Dept: RADIOLOGY | Facility: HOSPITAL | Age: 74
End: 2024-01-10
Payer: COMMERCIAL

## 2024-01-10 VITALS
RESPIRATION RATE: 18 BRPM | TEMPERATURE: 96.3 F | SYSTOLIC BLOOD PRESSURE: 171 MMHG | HEART RATE: 63 BPM | DIASTOLIC BLOOD PRESSURE: 83 MMHG | OXYGEN SATURATION: 98 %

## 2024-01-10 VITALS
HEART RATE: 55 BPM | RESPIRATION RATE: 20 BRPM | OXYGEN SATURATION: 96 % | SYSTOLIC BLOOD PRESSURE: 189 MMHG | DIASTOLIC BLOOD PRESSURE: 90 MMHG | TEMPERATURE: 97.3 F
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
## 2024-01-10 DIAGNOSIS — D50.8 IRON DEFICIENCY ANEMIA SECONDARY TO INADEQUATE DIETARY IRON INTAKE: Primary | ICD-10-CM

## 2024-01-10 DIAGNOSIS — T14.8XXA CONTUSION: ICD-10-CM

## 2024-01-10 DIAGNOSIS — S83.90XA KNEE SPRAIN: ICD-10-CM

## 2024-01-10 PROCEDURE — 72128 CT CHEST SPINE W/O DYE: CPT

## 2024-01-10 PROCEDURE — 70450 CT HEAD/BRAIN W/O DYE: CPT

## 2024-01-10 PROCEDURE — 96365 THER/PROPH/DIAG IV INF INIT: CPT

## 2024-01-10 PROCEDURE — G1004 CDSM NDSC: HCPCS

## 2024-01-10 PROCEDURE — 72125 CT NECK SPINE W/O DYE: CPT

## 2024-01-10 PROCEDURE — 99285 EMERGENCY DEPT VISIT HI MDM: CPT

## 2024-01-10 PROCEDURE — 72131 CT LUMBAR SPINE W/O DYE: CPT

## 2024-01-10 PROCEDURE — 99284 EMERGENCY DEPT VISIT MOD MDM: CPT | Performed by: EMERGENCY MEDICINE

## 2024-01-10 PROCEDURE — 73564 X-RAY EXAM KNEE 4 OR MORE: CPT

## 2024-01-10 RX ORDER — SODIUM CHLORIDE 9 MG/ML
20 INJECTION, SOLUTION INTRAVENOUS ONCE
Status: COMPLETED | OUTPATIENT
Start: 2024-01-10 | End: 2024-01-10

## 2024-01-10 RX ORDER — ACETAMINOPHEN 325 MG/1
975 TABLET ORAL ONCE
Status: COMPLETED | OUTPATIENT
Start: 2024-01-10 | End: 2024-01-10

## 2024-01-10 RX ORDER — CYCLOBENZAPRINE HCL 10 MG
5 TABLET ORAL 2 TIMES DAILY PRN
Qty: 10 TABLET | Refills: 0 | Status: SHIPPED | OUTPATIENT
Start: 2024-01-10 | End: 2024-01-15

## 2024-01-10 RX ORDER — LIDOCAINE 50 MG/G
1 PATCH TOPICAL DAILY
Qty: 2 PATCH | Refills: 0 | Status: SHIPPED | OUTPATIENT
Start: 2024-01-10 | End: 2024-01-12

## 2024-01-10 RX ORDER — NAPROXEN 500 MG/1
500 TABLET ORAL ONCE
Status: COMPLETED | OUTPATIENT
Start: 2024-01-10 | End: 2024-01-10

## 2024-01-10 RX ORDER — SODIUM CHLORIDE 9 MG/ML
20 INJECTION, SOLUTION INTRAVENOUS ONCE
Status: CANCELLED | OUTPATIENT
Start: 2024-01-17

## 2024-01-10 RX ORDER — NAPROXEN 500 MG/1
500 TABLET ORAL 2 TIMES DAILY WITH MEALS
Qty: 30 TABLET | Refills: 0 | Status: SHIPPED | OUTPATIENT
Start: 2024-01-10

## 2024-01-10 RX ADMIN — ACETAMINOPHEN 975 MG: 325 TABLET ORAL at 16:36

## 2024-01-10 RX ADMIN — SODIUM CHLORIDE 20 ML/HR: 0.9 INJECTION, SOLUTION INTRAVENOUS at 14:21

## 2024-01-10 RX ADMIN — IRON SUCROSE 200 MG: 20 INJECTION, SOLUTION INTRAVENOUS at 14:21

## 2024-01-10 RX ADMIN — NAPROXEN 500 MG: 500 TABLET ORAL at 16:36

## 2024-01-10 NOTE — PLAN OF CARE
Problem: Potential for Falls  Goal: Patient will remain free of falls  Description: INTERVENTIONS:  - Educate patient/family on patient safety including physical limitations  - Instruct patient to call for assistance with activity   - Consult OT/PT to assist with strengthening/mobility   - Keep Call bell within reach  - Keep bed low and locked with side rails adjusted as appropriate  - Keep care items and personal belongings within reach  - Initiate and maintain comfort rounds  - Make Fall Risk Sign visible to staff  -

## 2024-01-10 NOTE — PROGRESS NOTES
Karin Nieves  tolerated treatment well with no complications.      Karin Nieves is aware of future appt on 1/17/24 at 1230.     AVS printed and given to Karin Nieves:   No (Declined by aKrin Nieves)

## 2024-01-10 NOTE — PROGRESS NOTES
Pt reported to infusion center - stated she fell at home on Saturday was on the floor for ap px 45 minutes - was able to craw to a chair and get - left knee and lower leg swollen - and left elbow was tender - and she stated that she has a bump on her head - not on blood thinners - and didn't loose consciousness - advised pt that she should be eval in ER - she agreed to go to ER after iron infusion

## 2024-01-10 NOTE — DISCHARGE INSTRUCTIONS
Please be mindful of Flexeril make you drowsy do not take it while doing any activity take the Naprosyn for anti-inflammatory effect do not take additional ibuprofen Aleve or Motrin supplement with Tylenol.  Ice the area.

## 2024-01-10 NOTE — ED PROVIDER NOTES
History  Chief Complaint   Patient presents with    Fall     States slipped on tile floor 5 days ago. Fell forward onto L side, states hit head, no loc no thinners. Some neck soreness. Collar applied hurts everywhere     73-year-old female presents with left knee pain neck pain and some headache after she fell.  She fell couple days ago she caught her foot on a step hit her head did not lose consciousness no nausea vomiting confusion not on blood thinners.  Says everything hurts.  No other injuries or complaints.  Also has lower back pain.      History provided by:  Patient   used: No        Prior to Admission Medications   Prescriptions Last Dose Informant Patient Reported? Taking?   DULoxetine (Cymbalta) 30 mg delayed release capsule   No No   Sig: Take 1 capsule (30 mg total) by mouth daily   acyclovir (ZOVIRAX) 800 mg tablet   No No   Sig: Take 1 tablet (800 mg total) by mouth 2 (two) times a day for 15 days   calcium carbonate-vitamin D 500 mg-5 mcg per tablet   No No   Sig: Take 1 tablet by mouth daily with breakfast   citalopram (CeleXA) 40 mg tablet   No No   Sig: Take 1 tablet (40 mg total) by mouth daily   cyclobenzaprine (FLEXERIL) 10 mg tablet   No No   Sig: Take 1 tablet (10 mg total) by mouth daily at bedtime   furosemide (LASIX) 20 mg tablet   No No   Sig: Take 1 tablet (20 mg total) by mouth daily As needed   hydrochlorothiazide (HYDRODIURIL) 12.5 mg tablet   No No   Sig: Take 1 tablet (12.5 mg total) by mouth daily   losartan (COZAAR) 100 MG tablet   No No   Sig: Take 1 tablet (100 mg total) by mouth daily   metoprolol tartrate (LOPRESSOR) 100 mg tablet   No No   Sig: Take 1 tablet (100 mg total) by mouth 2 (two) times a day   pantoprazole (PROTONIX) 40 mg tablet   No No   Sig: Take 1 tablet (40 mg total) by mouth daily      Facility-Administered Medications: None       Past Medical History:   Diagnosis Date    Anemia     Anxiety disorder     Arthritis     Blood disorder      Bronchitis     Chronic pain     Depression     Dizziness     GERD (gastroesophageal reflux disease)     History of transfusion     Hypertension     Kidney stone     Kidney stones     Lightheadedness     Obesity     SOB (shortness of breath)     Stroke (HCC)        Past Surgical History:   Procedure Laterality Date    ABDOMINAL SURGERY      APPENDECTOMY      CHOLECYSTECTOMY      EGD AND COLONOSCOPY N/A 07/11/2016    Procedure: EGD AND COLONOSCOPY;  Surgeon: Yousif Lord MD;  Location: [facility] GI LAB;  Service:     EPIDURAL BLOCK INJECTION Right 07/13/2017    Procedure: BLOCK / INJECTION EPIDURAL STEROID TRANSFORAMINAL  L4-5;  Surgeon: Surendra Siu MD;  Location: [facility] MAIN OR;  Service: Pain Management     EPIDURAL BLOCK INJECTION Right 08/09/2017    Procedure: BLOCK / INJECTION EPIDURAL STEROID TRANSFORAMINAL L4-5- REPEAT;  Surgeon: Surendra Siu MD;  Location: [facility] MAIN OR;  Service: Pain Management     EPIDURAL BLOCK INJECTION Left 02/16/2018    Procedure: LEFT L4 AND L5 TRANSFORAMINAL EPIDURAL STEROID INJECTION;  Surgeon: Surendra Siu MD;  Location: [facility] MAIN OR;  Service: Pain Management     EYE SURGERY      GASTRIC BYPASS      MAMMO (HISTORICAL) Bilateral 05/06/2021    ND CYSTO/URETERO W/LITHOTRIPSY &INDWELL STENT INSRT Left 07/13/2016    Procedure: CYSTOSCOPY URETEROSCOPY WITH LITHOTRIPSY HOLMIUM LASER, RETROGRADE PYELOGRAM AND INSERTION STENT URETERAL;  Surgeon: Etienne Freeman MD;  Location: [facility] MAIN OR;  Service: Urology    TONSILLECTOMY         Family History   Problem Relation Age of Onset    No Known Problems Maternal Aunt     No Known Problems Maternal Aunt     No Known Problems Paternal Aunt     No Known Problems Paternal Aunt     No Known Problems Paternal Aunt     No Known Problems Daughter     No Known Problems Daughter     Diabetes Family     Heart disease Family         coronary arteriosclerosis     I have reviewed and agree with the history as documented.    E-Cigarette/Vaping     E-Cigarette Use Never User      E-Cigarette/Vaping Substances    Nicotine No     THC No     CBD No     Flavoring No     Other No     Unknown No      Social History     Tobacco Use    Smoking status: Never    Smokeless tobacco: Never   Vaping Use    Vaping status: Never Used   Substance Use Topics    Alcohol use: Never    Drug use: No       Review of Systems   Constitutional: Negative.    HENT: Negative.     Eyes: Negative.    Respiratory: Negative.     Cardiovascular: Negative.    Gastrointestinal: Negative.    Endocrine: Negative.    Genitourinary: Negative.    Musculoskeletal:  Positive for arthralgias, back pain, myalgias and neck pain.   Skin: Negative.    Allergic/Immunologic: Negative.    Neurological:  Positive for headaches.   Hematological: Negative.    Psychiatric/Behavioral: Negative.     All other systems reviewed and are negative.      Physical Exam  Physical Exam  Vitals and nursing note reviewed.   Constitutional:       Appearance: Normal appearance.   HENT:      Head: Normocephalic and atraumatic.      Nose: Nose normal.      Mouth/Throat:      Mouth: Mucous membranes are moist.   Eyes:      Extraocular Movements: Extraocular movements intact.      Pupils: Pupils are equal, round, and reactive to light.   Cardiovascular:      Rate and Rhythm: Normal rate and regular rhythm.   Pulmonary:      Effort: Pulmonary effort is normal.      Breath sounds: Normal breath sounds.   Abdominal:      General: Abdomen is flat. Bowel sounds are normal.      Palpations: Abdomen is soft.   Musculoskeletal:         General: Normal range of motion.      Cervical back: Normal range of motion and neck supple.      Comments: Left knee tender to palpation along the lateral aspect range of motion intact no edema noted popliteal pulses intact no open wounds noted.   Skin:     General: Skin is warm.      Capillary Refill: Capillary refill takes less than 2 seconds.   Neurological:      General: No focal deficit present.       Mental Status: She is alert and oriented to person, place, and time. Mental status is at baseline.   Psychiatric:         Mood and Affect: Mood normal.         Thought Content: Thought content normal.         Vital Signs  ED Triage Vitals [01/10/24 1548]   Temperature Pulse Respirations Blood Pressure SpO2   (!) 97.3 °F (36.3 °C) 64 (!) 24 (!) 236/109 97 %      Temp Source Heart Rate Source Patient Position - Orthostatic VS BP Location FiO2 (%)   Tympanic Monitor Sitting Right arm --      Pain Score       5           Vitals:    01/10/24 1548 01/10/24 1728   BP: (!) 236/109 (!) 189/90   Pulse: 64 55   Patient Position - Orthostatic VS: Sitting Lying         Visual Acuity      ED Medications  Medications   naproxen (NAPROSYN) tablet 500 mg (500 mg Oral Given 1/10/24 1636)   acetaminophen (TYLENOL) tablet 975 mg (975 mg Oral Given 1/10/24 1636)       Diagnostic Studies  Results Reviewed       None                   CT head without contrast   Final Result by E. Alec Schoenberger, MD (01/10 1646)      No acute intracranial abnormality. Chronic microangiopathy.                  Workstation performed: [id number]         CT spine cervical without contrast   Final Result by E. Alec Schoenberger, MD (01/10 1658)      No cervical spine fracture or traumatic malalignment.                  Workstation performed: [id number]         CT spine thoracic & lumbar wo contrast   Final Result by E. Alec Schoenberger, MD (01/10 1658)      No acute fracture. Degenerative changes most pronounced at L3-4 and L4-5.         Workstation performed: [id number]         XR knee 4+ vw left injury   Final Result by Donte Rodriguez MD (01/10 1650)      New mild subcutaneous fat stranding in left lateral distal thigh and left lateral proximal leg. Question subcutaneous contusion, subcutaneous edema, or cellulitis.      No acute osseous abnormality.      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      Degenerative changes as described.      The  study was marked in EPIC for immediate notification.         Workstation performed: [id number]                    Procedures  Procedures         ED Course                                             Medical Decision Making  Patient evaluated with CT of the head neck, back or lumbar spine reviewed results discussed with the patient left knee x-ray reviewed.  Patient offered Ace wrap some prescription pain medications and she verbalized understanding had no further questions at time of discharge.    Problems Addressed:  Contusion: acute illness or injury  Fall, initial encounter: acute illness or injury  Knee sprain: acute illness or injury    Amount and/or Complexity of Data Reviewed  External Data Reviewed: notes.  Radiology: ordered. Decision-making details documented in ED Course.    Risk  OTC drugs.  Prescription drug management.             Disposition  Final diagnoses:   Fall, initial encounter   Knee sprain   Contusion     Time reflects when diagnosis was documented in both MDM as applicable and the Disposition within this note       Time User Action Codes Description Comment    1/10/2024  5:12 PM AnepuRochelle Add [W19.XXXA] Fall, initial encounter     1/10/2024  5:12 PM AnepuAdrienneRochelle Add [S83.90XA] Knee sprain     1/10/2024  5:12 PM AnepuRochelle Add [T14.8XXA] Contusion           ED Disposition       ED Disposition   Discharge    Condition   Stable    Date/Time   Wed Jacob 10, 2024  5:12 PM    Comment   Karin Nieves discharge to home/self care.                   Follow-up Information       Follow up With Specialties Details Why Contact Info Additional Information    Debby Dawn MD Family Medicine Schedule an appointment as soon as possible for a visit   [address] [id number] [phone]       Critical access hospital Emergency Department Emergency Medicine  If symptoms worsen [address] [place] [id number]  [phone] [facility], [address], [place], [address]            Discharge Medication List as of 1/10/2024  5:13 PM        START taking these medications    Details   !! cyclobenzaprine (FLEXERIL) 10 mg tablet Take 0.5 tablets (5 mg total) by mouth 2 (two) times a day as needed for muscle spasms for up to 5 days, Starting Wed 1/10/2024, Until Mon 1/15/2024 at 2359, Normal      lidocaine (LIDODERM) 5 % Apply 1 patch topically over 12 hours daily for 2 days Remove & Discard patch within 12 hours or as directed by MD, Starting Wed 1/10/2024, Until Fri 1/12/2024, Normal      naproxen (Naprosyn) 500 mg tablet Take 1 tablet (500 mg total) by mouth 2 (two) times a day with meals, Starting Wed 1/10/2024, Normal       !! - Potential duplicate medications found. Please discuss with provider.        CONTINUE these medications which have NOT CHANGED    Details   acyclovir (ZOVIRAX) 800 mg tablet Take 1 tablet (800 mg total) by mouth 2 (two) times a day for 15 days, Starting Wed 12/20/2023, Until Thu 1/4/2024, Normal      calcium carbonate-vitamin D 500 mg-5 mcg per tablet Take 1 tablet by mouth daily with breakfast, Starting Fri 9/8/2023, Normal      citalopram (CeleXA) 40 mg tablet Take 1 tablet (40 mg total) by mouth daily, Starting Wed 12/20/2023, Normal      !! cyclobenzaprine (FLEXERIL) 10 mg tablet Take 1 tablet (10 mg total) by mouth daily at bedtime, Starting Wed 12/20/2023, Normal      DULoxetine (Cymbalta) 30 mg delayed release capsule Take 1 capsule (30 mg total) by mouth daily, Starting Wed 12/20/2023, Normal      furosemide (LASIX) 20 mg tablet Take 1 tablet (20 mg total) by mouth daily As needed, Starting Mon 10/16/2023, Normal      hydrochlorothiazide (HYDRODIURIL) 12.5 mg tablet Take 1 tablet (12.5 mg total) by mouth daily, Starting Wed 12/20/2023, Until Sat 12/14/2024, Normal      losartan (COZAAR) 100 MG tablet Take 1 tablet (100 mg total) by mouth daily, Starting Wed 12/20/2023,  Normal      metoprolol tartrate (LOPRESSOR) 100 mg tablet Take 1 tablet (100 mg total) by mouth 2 (two) times a day, Starting Wed 12/20/2023, Normal      pantoprazole (PROTONIX) 40 mg tablet Take 1 tablet (40 mg total) by mouth daily, Starting Wed 12/20/2023, Normal       !! - Potential duplicate medications found. Please discuss with provider.          No discharge procedures on file.    PDMP Review       None            ED Provider  Electronically Signed by             Rochelle Palencia DO  01/10/24 2319

## 2024-01-17 ENCOUNTER — HOSPITAL ENCOUNTER (OUTPATIENT)
Dept: INFUSION CENTER | Facility: HOSPITAL | Age: 74
Discharge: HOME | End: 2024-01-17
Attending: FAMILY MEDICINE
Payer: COMMERCIAL

## 2024-01-17 VITALS
OXYGEN SATURATION: 95 % | TEMPERATURE: 96.1 F | RESPIRATION RATE: 18 BRPM | DIASTOLIC BLOOD PRESSURE: 70 MMHG | SYSTOLIC BLOOD PRESSURE: 158 MMHG | HEART RATE: 61 BPM

## 2024-01-17 DIAGNOSIS — D50.8 IRON DEFICIENCY ANEMIA SECONDARY TO INADEQUATE DIETARY IRON INTAKE: Primary | ICD-10-CM

## 2024-01-17 PROCEDURE — 96365 THER/PROPH/DIAG IV INF INIT: CPT

## 2024-01-17 RX ORDER — SODIUM CHLORIDE 9 MG/ML
20 INJECTION, SOLUTION INTRAVENOUS ONCE
Status: COMPLETED | OUTPATIENT
Start: 2024-01-17 | End: 2024-01-17

## 2024-01-17 RX ORDER — SODIUM CHLORIDE 9 MG/ML
20 INJECTION, SOLUTION INTRAVENOUS ONCE
Status: CANCELLED | OUTPATIENT
Start: 2024-01-24

## 2024-01-17 RX ADMIN — SODIUM CHLORIDE 20 ML/HR: 0.9 INJECTION, SOLUTION INTRAVENOUS at 13:01

## 2024-01-17 RX ADMIN — IRON SUCROSE 200 MG: 20 INJECTION, SOLUTION INTRAVENOUS at 13:02

## 2024-01-17 NOTE — PROGRESS NOTES
Karin Nieves  tolerated treatment well with no complications.      Karin Nieves is aware of future appt on 1/24/24 at 1230.     AVS printed and given to Karin Nieves:   No (Declined by Karin Nieves)

## 2024-01-24 ENCOUNTER — HOSPITAL ENCOUNTER (OUTPATIENT)
Dept: INFUSION CENTER | Facility: HOSPITAL | Age: 74
Discharge: HOME | End: 2024-01-24
Attending: FAMILY MEDICINE
Payer: COMMERCIAL

## 2024-01-24 VITALS
OXYGEN SATURATION: 92 % | TEMPERATURE: 97.8 F | RESPIRATION RATE: 20 BRPM | SYSTOLIC BLOOD PRESSURE: 148 MMHG | HEART RATE: 61 BPM | DIASTOLIC BLOOD PRESSURE: 90 MMHG

## 2024-01-24 DIAGNOSIS — D50.8 IRON DEFICIENCY ANEMIA SECONDARY TO INADEQUATE DIETARY IRON INTAKE: Primary | ICD-10-CM

## 2024-01-24 RX ORDER — SODIUM CHLORIDE 9 MG/ML
20 INJECTION, SOLUTION INTRAVENOUS ONCE
Status: COMPLETED | OUTPATIENT
Start: 2024-01-24 | End: 2024-01-24

## 2024-01-24 RX ORDER — SODIUM CHLORIDE 9 MG/ML
20 INJECTION, SOLUTION INTRAVENOUS ONCE
Status: CANCELLED | OUTPATIENT
Start: 2024-01-31

## 2024-01-24 RX ADMIN — IRON SUCROSE 200 MG: 20 INJECTION, SOLUTION INTRAVENOUS at 12:56

## 2024-01-24 RX ADMIN — SODIUM CHLORIDE 20 ML/HR: 0.9 INJECTION, SOLUTION INTRAVENOUS at 12:55

## 2024-01-24 NOTE — PROGRESS NOTES
Karin Nieves  tolerated treatment well with no complications.      Karin Nieves is aware of future appt on 1/31 at 1230.     AVS printed and given to Karin Nieves: No (Declined by Karin Nieves)

## 2024-01-31 ENCOUNTER — HOSPITAL ENCOUNTER (OUTPATIENT)
Dept: INFUSION CENTER | Facility: HOSPITAL | Age: 74
Discharge: HOME | End: 2024-01-31
Attending: FAMILY MEDICINE
Payer: COMMERCIAL

## 2024-01-31 VITALS
RESPIRATION RATE: 20 BRPM | HEART RATE: 58 BPM | OXYGEN SATURATION: 96 % | SYSTOLIC BLOOD PRESSURE: 161 MMHG | TEMPERATURE: 96.7 F | DIASTOLIC BLOOD PRESSURE: 83 MMHG

## 2024-01-31 DIAGNOSIS — D50.8 IRON DEFICIENCY ANEMIA SECONDARY TO INADEQUATE DIETARY IRON INTAKE: Primary | ICD-10-CM

## 2024-01-31 PROCEDURE — 96365 THER/PROPH/DIAG IV INF INIT: CPT

## 2024-01-31 RX ORDER — SODIUM CHLORIDE 9 MG/ML
20 INJECTION, SOLUTION INTRAVENOUS ONCE
Status: COMPLETED | OUTPATIENT
Start: 2024-01-31 | End: 2024-01-31

## 2024-01-31 RX ORDER — SODIUM CHLORIDE 9 MG/ML
20 INJECTION, SOLUTION INTRAVENOUS ONCE
Status: CANCELLED | OUTPATIENT
Start: 2024-02-07

## 2024-01-31 RX ADMIN — SODIUM CHLORIDE 20 ML/HR: 9 INJECTION, SOLUTION INTRAVENOUS at 12:45

## 2024-01-31 RX ADMIN — IRON SUCROSE 200 MG: 20 INJECTION, SOLUTION INTRAVENOUS at 12:45

## 2024-02-19 DIAGNOSIS — M85.859 OSTEOPENIA OF NECK OF FEMUR, UNSPECIFIED LATERALITY: ICD-10-CM

## 2024-02-20 DIAGNOSIS — M85.859 OSTEOPENIA OF NECK OF FEMUR, UNSPECIFIED LATERALITY: ICD-10-CM

## 2024-02-20 DIAGNOSIS — I10 ESSENTIAL HYPERTENSION: ICD-10-CM

## 2024-02-20 DIAGNOSIS — Z87.11 HISTORY OF GASTRIC ULCER: ICD-10-CM

## 2024-02-20 DIAGNOSIS — K21.00 GASTROESOPHAGEAL REFLUX DISEASE WITH ESOPHAGITIS WITHOUT HEMORRHAGE: ICD-10-CM

## 2024-02-20 DIAGNOSIS — D50.9 IRON DEFICIENCY ANEMIA, UNSPECIFIED IRON DEFICIENCY ANEMIA TYPE: ICD-10-CM

## 2024-02-20 NOTE — TELEPHONE ENCOUNTER
From office on 8/29/19- \"Consider returning to PT.\" for bi-lateral knee pain. Referral entered and forwarded to Dr Jha for approval.    Refill request from [facility]

## 2024-02-21 RX ORDER — B-COMPLEX WITH VITAMIN C
1 TABLET ORAL
Qty: 30 TABLET | Refills: 2 | Status: SHIPPED | OUTPATIENT
Start: 2024-02-21

## 2024-02-21 RX ORDER — PANTOPRAZOLE SODIUM 40 MG/1
40 TABLET, DELAYED RELEASE ORAL DAILY
Qty: 30 TABLET | Refills: 0 | Status: SHIPPED | OUTPATIENT
Start: 2024-02-21

## 2024-02-21 RX ORDER — HYDROCHLOROTHIAZIDE 12.5 MG/1
12.5 TABLET ORAL DAILY
Qty: 90 TABLET | Refills: 0 | Status: SHIPPED | OUTPATIENT
Start: 2024-02-21 | End: 2025-02-15

## 2024-02-21 RX ORDER — LANOLIN ALCOHOL/MO/W.PET/CERES
1 CREAM (GRAM) TOPICAL
Qty: 30 TABLET | Refills: 0 | Status: SHIPPED | OUTPATIENT
Start: 2024-02-21

## 2024-03-08 ENCOUNTER — OFFICE VISIT (OUTPATIENT)
Dept: OBGYN CLINIC | Facility: CLINIC | Age: 74
End: 2024-03-08
Payer: COMMERCIAL

## 2024-03-08 VITALS — WEIGHT: 271 LBS | BODY MASS INDEX: 43.09 KG/M2 | SYSTOLIC BLOOD PRESSURE: 150 MMHG | DIASTOLIC BLOOD PRESSURE: 90 MMHG

## 2024-03-08 DIAGNOSIS — N39.41 URGE INCONTINENCE: ICD-10-CM

## 2024-03-08 PROCEDURE — 99203 OFFICE O/P NEW LOW 30 MIN: CPT | Performed by: OBSTETRICS & GYNECOLOGY

## 2024-03-08 NOTE — PROGRESS NOTES
Subjective     Karin Nieves is a 73 y.o.  female here for a problem visit.  Patient reports years of urinary incontinence having to wear products for leakage all of the time.  Biggest symptom is urgency and urge incontinence.  Patient voids frequently to try to avoid symptoms.  She does also note some leakage with cough or sneeze or strain.  She usually wakes 2 times at night to void with some leakage on the way to voiding.  Her largest baby was 8 pounds 1 ounce and she reports some significant repair needing after first delivery.  Patient has 1 cup of coffee every morning and occasionally has 1 in the evening if she has a visitor she does note that this may impact her frequency and urgency.  Patient takes Lasix occasionally when she is not voiding as well and this does not always work for her she often uses it every other day.  We reviewed urge and urge incontinence and also other types of leakage and contributing factors we discussed how to review possible impacts and that there are medications available that may help with urgency.  We discussed that she may have multiple factors contributing to her urinary incontinence and that urogynecology may give her the best evaluation of those issues.   Patient reports history of normal Paps has not had one for several years has not been sexually active for some time no bleeding or discharge.     Gynecologic History  No LMP recorded. Patient is postmenopausal.  Contraception: post menopausal status  Last mammogram: 2023. Results were: normal    Obstetric History  OB History    Para Term  AB Living   2 2 2         SAB IAB Ectopic Multiple Live Births                  # Outcome Date GA Lbr Holden/2nd Weight Sex Delivery Anes PTL Lv   2 Term            1 Term              Past Medical History:   Diagnosis Date    Anemia     Anxiety disorder     Arthritis     Blood disorder     Bronchitis     Chronic pain     Depression     Dizziness     GERD  (gastroesophageal reflux disease)     History of transfusion     Hypertension     Kidney stone     Kidney stones     Lightheadedness     Obesity     SOB (shortness of breath)     Stroke (HCC)      Past Surgical History:   Procedure Laterality Date    ABDOMINAL SURGERY      APPENDECTOMY      CHOLECYSTECTOMY      EGD AND COLONOSCOPY N/A 07/11/2016    Procedure: EGD AND COLONOSCOPY;  Surgeon: Yousif Lord MD;  Location: [facility] GI LAB;  Service:     EPIDURAL BLOCK INJECTION Right 07/13/2017    Procedure: BLOCK / INJECTION EPIDURAL STEROID TRANSFORAMINAL  L4-5;  Surgeon: Surendra Siu MD;  Location: [facility] MAIN OR;  Service: Pain Management     EPIDURAL BLOCK INJECTION Right 08/09/2017    Procedure: BLOCK / INJECTION EPIDURAL STEROID TRANSFORAMINAL L4-5- REPEAT;  Surgeon: Surendra Siu MD;  Location: [facility] MAIN OR;  Service: Pain Management     EPIDURAL BLOCK INJECTION Left 02/16/2018    Procedure: LEFT L4 AND L5 TRANSFORAMINAL EPIDURAL STEROID INJECTION;  Surgeon: Surendra Siu MD;  Location: [facility] MAIN OR;  Service: Pain Management     EYE SURGERY      GASTRIC BYPASS      MAMMO (HISTORICAL) Bilateral 05/06/2021    LA CYSTO/URETERO W/LITHOTRIPSY &INDWELL STENT INSRT Left 07/13/2016    Procedure: CYSTOSCOPY URETEROSCOPY WITH LITHOTRIPSY HOLMIUM LASER, RETROGRADE PYELOGRAM AND INSERTION STENT URETERAL;  Surgeon: Etienne Freeman MD;  Location: [facility] MAIN OR;  Service: Urology    TONSILLECTOMY       Current Outpatient Medications   Medication Instructions    acyclovir (ZOVIRAX) 800 mg, Oral, 2 times daily    Calcium Carb-Cholecalciferol (calcium carbonate-vitamin D) 500 mg-5 mcg tablet 1 tablet, Oral, Daily with breakfast    calcium carbonate-vitamin D 500 mg-5 mcg per tablet 1 tablet, Oral, Daily with breakfast    citalopram (CELEXA) 40 mg, Oral, Daily    cyclobenzaprine (FLEXERIL) 10 mg, Oral, Daily at bedtime    cyclobenzaprine (FLEXERIL) 5 mg, Oral, 2 times daily PRN    DULoxetine (CYMBALTA) 30 mg, Oral, Daily     furosemide (LASIX) 20 mg, Oral, Daily, As needed    hydroCHLOROthiazide 12.5 mg, Oral, Daily    lidocaine (LIDODERM) 5 % 1 patch, Topical, Daily, Remove & Discard patch within 12 hours or as directed by MD    losartan (COZAAR) 100 mg, Oral, Daily    metoprolol tartrate (LOPRESSOR) 100 mg, Oral, 2 times daily    naproxen (NAPROSYN) 500 mg, Oral, 2 times daily with meals    pantoprazole (PROTONIX) 40 mg, Oral, Daily     Allergies   Allergen Reactions    Vicodin [Hydrocodone-Acetaminophen] GI Intolerance    Amlodipine Swelling     Leg swelling     Social History     Tobacco Use    Smoking status: Never    Smokeless tobacco: Never   Vaping Use    Vaping status: Never Used   Substance Use Topics    Alcohol use: Never    Drug use: No         Review of Systems  Review of Systems   Constitutional:  Negative for fatigue, fever and unexpected weight change.   HENT:  Negative for dental problem, sinus pressure and sinus pain.    Eyes:  Negative for visual disturbance.   Respiratory:  Negative for cough, shortness of breath and wheezing.    Cardiovascular:  Negative for chest pain and leg swelling.   Gastrointestinal:  Negative for blood in stool, constipation, diarrhea, nausea and vomiting.   Endocrine: Negative for cold intolerance, heat intolerance and polydipsia.   Genitourinary:  Positive for frequency and urgency. Negative for dysuria, hematuria, menstrual problem and pelvic pain.   Musculoskeletal:  Negative for arthralgias and back pain.   Neurological:  Negative for dizziness, seizures and headaches.   Psychiatric/Behavioral:  The patient is not nervous/anxious.         Objective     /90   Wt 123 kg (271 lb)   BMI 43.09 kg/m²   General appearance: alert and oriented, in no acute distress  Head: Normocephalic, without obvious abnormality, atraumatic  Abdomen: soft, non-tender; bowel sounds normal; no masses,  no organomegaly  Pelvic: external genitalia normal, vagina normal without discharge, uterus normal size,  shape, and consistency, no cervical motion tenderness, cervix normal in appearance, no adnexal masses or tenderness, and grade 1 descensus of the anterior vaginal wall on strain and grade 1 descensus of the posterior vaginal wall on strain minimal descensus no significant prolapse  Extremities:  Normal warm well-perfused 1+ edema bilaterally       Assessment  73-year-old G2, P2 with primarily urge incontinence but with mixed symptoms and constant leakage.     Plan  Declines trial of medication at this time and would like further evaluation first.  Referral to urogynecology for evaluation and treatment

## 2024-03-11 RX ORDER — ALBUTEROL SULFATE 90 UG/1
2 AEROSOL, METERED RESPIRATORY (INHALATION) EVERY 4 HOURS PRN
Qty: 18 G | Refills: 0 | OUTPATIENT
Start: 2024-03-11

## 2024-03-11 NOTE — TELEPHONE ENCOUNTER
"Called pt. She said she \"thinks\" Dr Estrada prescribed it. Asked if she would like an appt to discuss it. She declined.   "

## 2024-03-26 ENCOUNTER — TELEPHONE (OUTPATIENT)
Age: 74
End: 2024-03-26

## 2024-03-26 ENCOUNTER — OFFICE VISIT (OUTPATIENT)
Age: 74
End: 2024-03-26

## 2024-03-26 VITALS
SYSTOLIC BLOOD PRESSURE: 144 MMHG | WEIGHT: 266 LBS | RESPIRATION RATE: 18 BRPM | HEART RATE: 59 BPM | BODY MASS INDEX: 41.75 KG/M2 | OXYGEN SATURATION: 97 % | HEIGHT: 67 IN | DIASTOLIC BLOOD PRESSURE: 78 MMHG

## 2024-03-26 DIAGNOSIS — D50.9 IRON DEFICIENCY ANEMIA, UNSPECIFIED IRON DEFICIENCY ANEMIA TYPE: ICD-10-CM

## 2024-03-26 DIAGNOSIS — I10 ESSENTIAL HYPERTENSION: ICD-10-CM

## 2024-03-26 DIAGNOSIS — M67.449 MUCOUS CYST OF FINGER: ICD-10-CM

## 2024-03-26 DIAGNOSIS — Z59.9 FINANCIAL DIFFICULTIES: ICD-10-CM

## 2024-03-26 DIAGNOSIS — F41.9 ANXIETY AND DEPRESSION: ICD-10-CM

## 2024-03-26 DIAGNOSIS — F32.A ANXIETY AND DEPRESSION: ICD-10-CM

## 2024-03-26 DIAGNOSIS — M79.89 LEG SWELLING: ICD-10-CM

## 2024-03-26 DIAGNOSIS — M85.859 OSTEOPENIA OF NECK OF FEMUR, UNSPECIFIED LATERALITY: ICD-10-CM

## 2024-03-26 DIAGNOSIS — Z02.71 ENCOUNTER FOR DISABILITY EXAMINATION: Primary | ICD-10-CM

## 2024-03-26 DIAGNOSIS — G89.4 CHRONIC PAIN SYNDROME: ICD-10-CM

## 2024-03-26 DIAGNOSIS — K21.00 GASTROESOPHAGEAL REFLUX DISEASE WITH ESOPHAGITIS WITHOUT HEMORRHAGE: ICD-10-CM

## 2024-03-26 DIAGNOSIS — Z87.11 HISTORY OF GASTRIC ULCER: ICD-10-CM

## 2024-03-26 PROCEDURE — 99213 OFFICE O/P EST LOW 20 MIN: CPT | Performed by: FAMILY MEDICINE

## 2024-03-26 PROCEDURE — G2211 COMPLEX E/M VISIT ADD ON: HCPCS | Performed by: FAMILY MEDICINE

## 2024-03-26 RX ORDER — DULOXETIN HYDROCHLORIDE 30 MG/1
30 CAPSULE, DELAYED RELEASE ORAL DAILY
Qty: 90 CAPSULE | Refills: 1 | Status: SHIPPED | OUTPATIENT
Start: 2024-03-26

## 2024-03-26 RX ORDER — CYCLOBENZAPRINE HCL 10 MG
10 TABLET ORAL
Qty: 90 TABLET | Refills: 0 | Status: SHIPPED | OUTPATIENT
Start: 2024-03-26

## 2024-03-26 RX ORDER — METOPROLOL TARTRATE 100 MG/1
100 TABLET ORAL 2 TIMES DAILY
Qty: 180 TABLET | Refills: 1 | Status: SHIPPED | OUTPATIENT
Start: 2024-03-26

## 2024-03-26 RX ORDER — FUROSEMIDE 20 MG/1
20 TABLET ORAL DAILY
Qty: 90 TABLET | Refills: 1 | Status: SHIPPED | OUTPATIENT
Start: 2024-03-26

## 2024-03-26 RX ORDER — CITALOPRAM 40 MG/1
40 TABLET ORAL DAILY
Qty: 90 TABLET | Refills: 1 | Status: SHIPPED | OUTPATIENT
Start: 2024-03-26

## 2024-03-26 RX ORDER — B-COMPLEX WITH VITAMIN C
1 TABLET ORAL
Qty: 30 TABLET | Refills: 2 | Status: SHIPPED | OUTPATIENT
Start: 2024-03-26

## 2024-03-26 RX ORDER — PANTOPRAZOLE SODIUM 40 MG/1
40 TABLET, DELAYED RELEASE ORAL DAILY
Qty: 90 TABLET | Refills: 0 | Status: SHIPPED | OUTPATIENT
Start: 2024-03-26

## 2024-03-26 RX ORDER — LOSARTAN POTASSIUM 100 MG/1
100 TABLET ORAL DAILY
Qty: 90 TABLET | Refills: 1 | Status: SHIPPED | OUTPATIENT
Start: 2024-03-26

## 2024-03-26 SDOH — ECONOMIC STABILITY - INCOME SECURITY: PROBLEM RELATED TO HOUSING AND ECONOMIC CIRCUMSTANCES, UNSPECIFIED: Z59.9
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
## 2024-03-26 NOTE — LETTER
March 28, 2024     Patient: Karin Nieves   YOB: 1950   Date of Visit: 3/26/2024         To Whom it May Concern:    The above named patient has a medical necessity for a handicap placard. The patient's diagnosis is:      Is severely and permanently limited in the ability to walk because of an arthritic, neurological, or orthopedic condition; or cannot walk two hundred feet without stopping to rest.      If you have any questions, please feel free to call our office. Thank you.         If you have any questions or concerns, please don't hesitate to call.         Sincerely,          Diamond Starkey MD        CC: No Recipients

## 2024-03-27 ENCOUNTER — PATIENT OUTREACH (OUTPATIENT)
Age: 74
End: 2024-03-27

## 2024-03-27 NOTE — PROGRESS NOTES
"[facility] had received a referral from Diamond Starkey MD r/t financial difficulties. [facility] had completed a chart review. Per order, reason for referral: patient with at risk responses for financial resource strain, transportation needs, utilities, housing stability, and/or food insecurity (SDOH). [facility] will need to outreach patient to further assess need.    [facility] had called the patient via phone. [facility] unable to leave a voicemail as \"caller is not available.\" [facility] will attempt to call again at a late date and time. [facility] will continue to be available.      "

## 2024-03-28 PROBLEM — Z02.71 ENCOUNTER FOR DISABILITY EXAMINATION: Status: ACTIVE | Noted: 2024-03-28

## 2024-03-28 NOTE — ASSESSMENT & PLAN NOTE
Patient here for DMV disability placard evaluation. She has history of chronic pain syndrome, spinal stenosis, CVA, CHF. Walks with assistive device. Qualifies for disability placard, paperwork filled out and given to patient during visit.

## 2024-03-29 ENCOUNTER — TELEPHONE (OUTPATIENT)
Age: 74
End: 2024-03-29

## 2024-03-30 ENCOUNTER — HOSPITAL ENCOUNTER (EMERGENCY)
Facility: HOSPITAL | Age: 74
Discharge: HOME | End: 2024-03-30
Attending: EMERGENCY MEDICINE
Payer: COMMERCIAL

## 2024-03-30 ENCOUNTER — APPOINTMENT (EMERGENCY)
Dept: RADIOLOGY | Facility: HOSPITAL | Age: 74
End: 2024-03-30
Payer: COMMERCIAL

## 2024-03-30 VITALS
TEMPERATURE: 98.1 F | DIASTOLIC BLOOD PRESSURE: 95 MMHG | RESPIRATION RATE: 20 BRPM | SYSTOLIC BLOOD PRESSURE: 191 MMHG | HEART RATE: 67 BPM | OXYGEN SATURATION: 97 %
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
## 2024-03-30 DIAGNOSIS — S20.212A CHEST WALL CONTUSION, LEFT, INITIAL ENCOUNTER: Primary | ICD-10-CM

## 2024-03-30 PROCEDURE — 99284 EMERGENCY DEPT VISIT MOD MDM: CPT | Performed by: EMERGENCY MEDICINE

## 2024-03-30 PROCEDURE — 71045 X-RAY EXAM CHEST 1 VIEW: CPT

## 2024-03-30 PROCEDURE — 99284 EMERGENCY DEPT VISIT MOD MDM: CPT

## 2024-03-30 RX ORDER — NAPROXEN 500 MG/1
500 TABLET ORAL 2 TIMES DAILY WITH MEALS
Qty: 30 TABLET | Refills: 0 | Status: SHIPPED | OUTPATIENT
Start: 2024-03-30

## 2024-03-30 RX ORDER — ACETAMINOPHEN 325 MG/1
975 TABLET ORAL ONCE
Status: COMPLETED | OUTPATIENT
Start: 2024-03-30 | End: 2024-03-30

## 2024-03-30 RX ORDER — LIDOCAINE 50 MG/G
1 PATCH TOPICAL DAILY
Qty: 15 PATCH | Refills: 0 | Status: SHIPPED | OUTPATIENT
Start: 2024-03-30

## 2024-03-30 RX ORDER — LIDOCAINE 50 MG/G
1 PATCH TOPICAL ONCE
Status: DISCONTINUED | OUTPATIENT
Start: 2024-03-30 | End: 2024-03-30 | Stop reason: HOSPADM

## 2024-03-30 RX ADMIN — LIDOCAINE 1 PATCH: 50 PATCH TOPICAL at 20:58

## 2024-03-30 RX ADMIN — ACETAMINOPHEN 975 MG: 325 TABLET ORAL at 20:57

## 2024-03-31 NOTE — DISCHARGE INSTRUCTIONS
Your chest x-ray today was normal.  You did not have a collapsed lung.  Use the prescribed Naprosyn instead of Motrin for pain control.  You can topically use Lidoderm patches and apply ice to your chest wall.  Use the provided incentive spirometer at least for the next week.  If you have any severe worsening of pain, significant shortness of breath, return to the emergency department.

## 2024-03-31 NOTE — ED PROVIDER NOTES
History  Chief Complaint   Patient presents with    Fall     Fell a week ago when she was having a dream. Feet got tangled in bedding and she fell out of bed, hit head. Unknown loc, no thinners. Pain to L rib cage. Previous fall and pains from then as well     HPI  Patient is a 73-year-old female presenting for evaluation of left-sided chest wall pain, tenderness.  Patient states that about a week ago she rolled out of bed and landed on her left side.  Patient believes that she struck her head.  Patient denies loss of consciousness.  Patient states that she had a transient headache which is resolved, denies neck pain or central back pain.  Patient initially had some soreness on the left side but states over the course of the past week it has progressed to sharp pain worse with taking a deep breath, twisting, moving.  Patient has been taking Tylenol and tramadol at home with transient relief of symptoms.  Patient states that it hurts to cough.  Patient denies anticoagulation or antiplatelet use.  Prior to Admission Medications   Prescriptions Last Dose Informant Patient Reported? Taking?   DULoxetine (Cymbalta) 30 mg delayed release capsule   No No   Sig: Take 1 capsule (30 mg total) by mouth daily   calcium carbonate-vitamin D 500 mg-5 mcg per tablet   No No   Sig: Take 1 tablet by mouth daily with breakfast   citalopram (CeleXA) 40 mg tablet   No No   Sig: Take 1 tablet (40 mg total) by mouth daily   cyclobenzaprine (FLEXERIL) 10 mg tablet Not Taking  No No   Sig: Take 1 tablet (10 mg total) by mouth daily at bedtime   Patient not taking: Reported on 3/30/2024   furosemide (LASIX) 20 mg tablet   No No   Sig: Take 1 tablet (20 mg total) by mouth daily As needed   losartan (COZAAR) 100 MG tablet   No No   Sig: Take 1 tablet (100 mg total) by mouth daily   metoprolol tartrate (LOPRESSOR) 100 mg tablet   No No   Sig: Take 1 tablet (100 mg total) by mouth 2 (two) times a day   pantoprazole (PROTONIX) 40 mg tablet   No  No   Sig: Take 1 tablet (40 mg total) by mouth daily      Facility-Administered Medications: None       Past Medical History:   Diagnosis Date    Anemia     Anxiety disorder     Arthritis     Blood disorder     Bronchitis     Chronic pain     Depression     Dizziness     GERD (gastroesophageal reflux disease)     History of transfusion     Hypertension     Kidney stone     Kidney stones     Lightheadedness     Obesity     SOB (shortness of breath)     Stroke (HCC)        Past Surgical History:   Procedure Laterality Date    ABDOMINAL SURGERY      APPENDECTOMY      CHOLECYSTECTOMY      EGD AND COLONOSCOPY N/A 07/11/2016    Procedure: EGD AND COLONOSCOPY;  Surgeon: Yousif Lord MD;  Location: [facility] GI LAB;  Service:     EPIDURAL BLOCK INJECTION Right 07/13/2017    Procedure: BLOCK / INJECTION EPIDURAL STEROID TRANSFORAMINAL  L4-5;  Surgeon: Surendra Siu MD;  Location: [facility] MAIN OR;  Service: Pain Management     EPIDURAL BLOCK INJECTION Right 08/09/2017    Procedure: BLOCK / INJECTION EPIDURAL STEROID TRANSFORAMINAL L4-5- REPEAT;  Surgeon: Surendra Siu MD;  Location: [facility] MAIN OR;  Service: Pain Management     EPIDURAL BLOCK INJECTION Left 02/16/2018    Procedure: LEFT L4 AND L5 TRANSFORAMINAL EPIDURAL STEROID INJECTION;  Surgeon: Surendra Siu MD;  Location: [facility] MAIN OR;  Service: Pain Management     EYE SURGERY      GASTRIC BYPASS      MAMMO (HISTORICAL) Bilateral 05/06/2021    MN CYSTO/URETERO W/LITHOTRIPSY &INDWELL STENT INSRT Left 07/13/2016    Procedure: CYSTOSCOPY URETEROSCOPY WITH LITHOTRIPSY HOLMIUM LASER, RETROGRADE PYELOGRAM AND INSERTION STENT URETERAL;  Surgeon: Etienne Freeman MD;  Location: [facility] MAIN OR;  Service: Urology    TONSILLECTOMY         Family History   Problem Relation Age of Onset    No Known Problems Maternal Aunt     No Known Problems Maternal Aunt     No Known Problems Paternal Aunt     No Known Problems Paternal Aunt     No Known Problems Paternal Aunt     No Known  Problems Daughter     No Known Problems Daughter     Diabetes Family     Heart disease Family         coronary arteriosclerosis     I have reviewed and agree with the history as documented.    E-Cigarette/Vaping    E-Cigarette Use Never User      E-Cigarette/Vaping Substances    Nicotine No     THC No     CBD No     Flavoring No     Other No     Unknown No      Social History     Tobacco Use    Smoking status: Never    Smokeless tobacco: Never   Vaping Use    Vaping status: Never Used   Substance Use Topics    Alcohol use: Never    Drug use: No       Review of Systems   Constitutional:  Negative for chills, fatigue and fever.   Respiratory:  Negative for cough and shortness of breath.    Cardiovascular:  Positive for chest pain (Left-sided chest wall).   Gastrointestinal:  Negative for diarrhea, nausea and vomiting.   Musculoskeletal:  Negative for arthralgias and myalgias.   Neurological:  Positive for headaches (Resolved).   All other systems reviewed and are negative.      Physical Exam  Physical Exam  Vitals and nursing note reviewed.   Constitutional:       General: She is not in acute distress.     Appearance: Normal appearance. She is not ill-appearing, toxic-appearing or diaphoretic.      Comments: Uncomfortable appearing but nontoxic nondistressed   HENT:      Head: Normocephalic and atraumatic.      Comments: Moist mucous membranes     Right Ear: External ear normal.      Left Ear: External ear normal.   Eyes:      General:         Right eye: No discharge.         Left eye: No discharge.   Cardiovascular:      Comments: Regular rate and rhythm, no murmurs rubs or gallops.  Extremities warm and well-perfused without mottling  Pulmonary:      Effort: No respiratory distress.      Comments: No increased work of breathing.  Patient not splinting.  Left-sided primarily posterior rib tenderness around ribs 7, 8, 9.  No overlying skin changes, no ecchymosis, no palpable crepitus.  Abdominal:      General: There  is no distension.      Comments: Abdomen soft, nontender, nondistended without rigidity, rebound, guarding   Musculoskeletal:         General: No deformity.      Cervical back: Normal range of motion.   Skin:     Findings: No lesion or rash.   Neurological:      Mental Status: She is alert and oriented to person, place, and time. Mental status is at baseline.      Comments: Awake, alert, pleasant, interactive   Psychiatric:         Mood and Affect: Mood and affect normal.         Vital Signs  ED Triage Vitals [03/30/24 1939]   Temperature Pulse Respirations Blood Pressure SpO2   98.1 °F (36.7 °C) 67 20 (!) 191/95 97 %      Temp Source Heart Rate Source Patient Position - Orthostatic VS BP Location FiO2 (%)   Tympanic Monitor Sitting Left arm --      Pain Score       10 - Worst Possible Pain           Vitals:    03/30/24 1939   BP: (!) 191/95   Pulse: 67   Patient Position - Orthostatic VS: Sitting         Visual Acuity      ED Medications  Medications   lidocaine (LIDODERM) 5 % patch 1 patch (1 patch Topical Medication Applied 3/30/24 2058)   acetaminophen (TYLENOL) tablet 975 mg (975 mg Oral Given 3/30/24 2057)       Diagnostic Studies  Results Reviewed       None                   XR chest 1 view portable    (Results Pending)              Procedures  Procedures         ED Course  ED Course as of 03/30/24 2132   Sat Mar 30, 2024   2129 XR chest 1 view portable                                             Medical Decision Making  I obtained history from the patient.  I reviewed external medical documentation.  Patient was evaluated by primary care provider on 3/26/2024 for disability examination and medication refill.  Patient was doing well at that time.  Patient with presentation concerning for rib fracture or contusion.  Patient with equal bilateral breath sounds, satting 97% on room air, not splinting.  I ordered and independently interpreted a chest x-ray primarily to evaluate for pneumothorax.  I treated  patient with Lidoderm patch, Tylenol in the emergency department.  Patient without evidence of obvious rib fracture or pneumothorax on chest x-ray.  Provided with prescription for Naprosyn, Lidoderm patches, incentive spirometer, reassurance, discharged with return precautions.    Amount and/or Complexity of Data Reviewed  Radiology: ordered. Decision-making details documented in ED Course.    Risk  OTC drugs.  Prescription drug management.             Disposition  Final diagnoses:   Chest wall contusion, left, initial encounter   Fall from bed, initial encounter     Time reflects when diagnosis was documented in both MDM as applicable and the Disposition within this note       Time User Action Codes Description Comment    3/30/2024  9:30 PM Patrice Reyna Add [S20.212A] Chest wall contusion, left, initial encounter     3/30/2024  9:30 PM Patrice Reyna Add [W06.XXXA] Fall from bed, initial encounter           ED Disposition       ED Disposition   Discharge    Condition   Stable    Date/Time   Sat Mar 30, 2024  9:29 PM    Comment   Karin Nieves discharge to home/self care.                   Follow-up Information       Follow up With Specialties Details Why Contact Info Additional Information    [facility] Emergency Department Emergency Medicine  If symptoms worsen [address] [place] [address]  [phone] [facility] Emergency Department, [address] [place] [address], [id number]            Patient's Medications   Discharge Prescriptions    LIDOCAINE (LIDODERM) 5 %    Apply 1 patch topically over 12 hours daily Remove & Discard patch within 12 hours or as directed by MD       Start Date: 3/30/2024 End Date: --       Order Dose: 1 patch       Quantity: 15 patch    Refills: 0    NAPROXEN (NAPROSYN) 500 MG TABLET    Take 1 tablet (500 mg total) by mouth 2 (two) times a day with meals       Start Date: 3/30/2024 End Date: --       Order Dose: 500  mg       Quantity: 30 tablet    Refills: 0       No discharge procedures on file.    PDMP Review       None            ED Provider  Electronically Signed by             Patrice Reyna MD  03/30/24 2412

## 2024-04-01 ENCOUNTER — PATIENT OUTREACH (OUTPATIENT)
Age: 74
End: 2024-04-01

## 2024-04-01 NOTE — LETTER
[address]  [facility] [address]  [facility] [address]  [phone]    Re: Financial difficulties    4/1/2024       Dear Karin,    I tried to reach you by phone and was unfortunately unable to reach you. I am the Outpatient Care Manager -  from [facility]. I am following up from your last office visit. Please give me a call at [phone] from 8am-4:30pm, Mon-Fri.     Sincerely,         MAN Rene   Outpatient Care Manager -

## 2024-04-01 NOTE — PROGRESS NOTES
"RINKU had called the patient via phone. RUPESH unable to leave a voicemail as \"caller is not available.\" RINKU notes this is the second phone call attempt. RINKU sent unable to reach letter via mail. RINKU closed referral. Please reconsult SW for future needs.  "

## 2024-04-09 ENCOUNTER — TELEPHONE (OUTPATIENT)
Age: 74
End: 2024-04-09

## 2024-04-09 NOTE — TELEPHONE ENCOUNTER
Patient needs another script for her Pelvic US. The first facility did not take her insurance. She is requesting that another script be faxed to [facility].    Please contact patient when completed at  [phone]

## 2024-04-10 NOTE — TELEPHONE ENCOUNTER
T/c to patient at mobile number listed in chart disconnected , after review of her office notes from her visit with To[facility] there was no discussion of ordering an US . When patient calls back please update phone number ? Confirm we have the correct number  [facility]

## 2024-05-16 ENCOUNTER — TELEPHONE (OUTPATIENT)
Age: 74
End: 2024-05-16

## 2024-05-16 DIAGNOSIS — M79.89 LEG SWELLING: ICD-10-CM

## 2024-05-16 DIAGNOSIS — I10 ESSENTIAL HYPERTENSION: ICD-10-CM

## 2024-05-16 DIAGNOSIS — D50.8 IRON DEFICIENCY ANEMIA SECONDARY TO INADEQUATE DIETARY IRON INTAKE: Primary | ICD-10-CM

## 2024-05-16 DIAGNOSIS — D50.9 IRON DEFICIENCY ANEMIA, UNSPECIFIED IRON DEFICIENCY ANEMIA TYPE: Primary | ICD-10-CM

## 2024-05-16 DIAGNOSIS — K21.00 GASTROESOPHAGEAL REFLUX DISEASE WITH ESOPHAGITIS WITHOUT HEMORRHAGE: ICD-10-CM

## 2024-05-16 DIAGNOSIS — D50.9 IRON DEFICIENCY ANEMIA, UNSPECIFIED IRON DEFICIENCY ANEMIA TYPE: ICD-10-CM

## 2024-05-16 DIAGNOSIS — Z87.11 HISTORY OF GASTRIC ULCER: ICD-10-CM

## 2024-05-16 RX ORDER — LOSARTAN POTASSIUM 100 MG/1
100 TABLET ORAL DAILY
Qty: 90 TABLET | Refills: 1 | Status: SHIPPED | OUTPATIENT
Start: 2024-05-16

## 2024-05-16 RX ORDER — PANTOPRAZOLE SODIUM 40 MG/1
40 TABLET, DELAYED RELEASE ORAL DAILY
Qty: 90 TABLET | Refills: 0 | Status: SHIPPED | OUTPATIENT
Start: 2024-05-16

## 2024-05-16 RX ORDER — FUROSEMIDE 20 MG/1
20 TABLET ORAL DAILY
Qty: 90 TABLET | Refills: 1 | Status: SHIPPED | OUTPATIENT
Start: 2024-05-16

## 2024-05-16 NOTE — TELEPHONE ENCOUNTER
Attempted contacting patient to inform her that Rx's have been sent to her pharmacy, and also to relay PCP's message regarding labs that need to complete. No answer, unable to leave message as patient VM Box is full and unable to leave message.

## 2024-05-16 NOTE — TELEPHONE ENCOUNTER
Attempted to contact pt to schedule OVS for follow up anemia, unable to leave a vm, mailbox not set up

## 2024-05-16 NOTE — TELEPHONE ENCOUNTER
VM left on Rx Line     Hi, my name is Karin Cole and I need a few prescriptions refilled. Hold one second. I'll get him. OK. The first one, the first refill is PANTOPRAZOLE. It's 40 milligrams, OK and I'd like to get a 90 day if I can the next one. The next one is Naproxen, which I don't really need. Now wait, honey, we patient with me. This second one is [place] LASARTAN. That's 100 milligrams once daily and I'd like a 90 day of that one. Also, I need it's FUROSEMIDE and that's 20 milligrams once a day. Hold on, just bear with me. I also need my inhaler. They would not refill it for me. I need this inhaler because whenever I do something, I am gasping for air and the inhaler is you what? There's a lot that I need. Just bear with me, please. I need. OK, that's it for now. I'll call in the rest. Thank you.  You received a voice mail from [employer] CALLER.

## 2024-05-23 ENCOUNTER — TELEPHONE (OUTPATIENT)
Age: 74
End: 2024-05-23

## 2024-05-23 DIAGNOSIS — D50.8 IRON DEFICIENCY ANEMIA SECONDARY TO INADEQUATE DIETARY IRON INTAKE: Primary | ICD-10-CM

## 2024-05-23 RX ORDER — SODIUM CHLORIDE 9 MG/ML
20 INJECTION, SOLUTION INTRAVENOUS ONCE
OUTPATIENT
Start: 2024-05-23

## 2024-08-06 ENCOUNTER — OFFICE VISIT (OUTPATIENT)
Age: 74
End: 2024-08-06

## 2024-08-06 ENCOUNTER — APPOINTMENT (OUTPATIENT)
Dept: LAB | Facility: CLINIC | Age: 74
End: 2024-08-06
Payer: MEDICAID

## 2024-08-06 VITALS
HEART RATE: 56 BPM | WEIGHT: 267 LBS | TEMPERATURE: 98 F | HEIGHT: 66 IN | OXYGEN SATURATION: 98 % | BODY MASS INDEX: 42.91 KG/M2 | RESPIRATION RATE: 18 BRPM | SYSTOLIC BLOOD PRESSURE: 130 MMHG | DIASTOLIC BLOOD PRESSURE: 82 MMHG

## 2024-08-06 DIAGNOSIS — R22.32 NODULE OF FINGER OF LEFT HAND: ICD-10-CM

## 2024-08-06 DIAGNOSIS — Q79.9 BONY ABNORMALITY: ICD-10-CM

## 2024-08-06 DIAGNOSIS — R53.83 OTHER FATIGUE: ICD-10-CM

## 2024-08-06 DIAGNOSIS — R73.03 PRE-DIABETES: Primary | ICD-10-CM

## 2024-08-06 DIAGNOSIS — I10 ESSENTIAL HYPERTENSION: ICD-10-CM

## 2024-08-06 DIAGNOSIS — J45.909 REACTIVE AIRWAY DISEASE WITHOUT COMPLICATION, UNSPECIFIED ASTHMA SEVERITY, UNSPECIFIED WHETHER PERSISTENT: ICD-10-CM

## 2024-08-06 DIAGNOSIS — R73.03 PRE-DIABETES: ICD-10-CM

## 2024-08-06 DIAGNOSIS — H53.8 BLURRED VISION: ICD-10-CM

## 2024-08-06 LAB
25(OH)D3 SERPL-MCNC: 30.8 NG/ML (ref 30–100)
ANION GAP SERPL CALCULATED.3IONS-SCNC: 9 MMOL/L (ref 4–13)
BUN SERPL-MCNC: 18 MG/DL (ref 5–25)
CALCIUM SERPL-MCNC: 9 MG/DL (ref 8.4–10.2)
CHLORIDE SERPL-SCNC: 103 MMOL/L (ref 96–108)
CO2 SERPL-SCNC: 29 MMOL/L (ref 21–32)
CREAT SERPL-MCNC: 0.74 MG/DL (ref 0.6–1.3)
EST. AVERAGE GLUCOSE BLD GHB EST-MCNC: 123 MG/DL
FOLATE SERPL-MCNC: 7.9 NG/ML
GFR SERPL CREATININE-BSD FRML MDRD: 81 ML/MIN/1.73SQ M
GLUCOSE SERPL-MCNC: 92 MG/DL (ref 65–140)
HBA1C MFR BLD: 5.9 %
POTASSIUM SERPL-SCNC: 4.1 MMOL/L (ref 3.5–5.3)
SODIUM SERPL-SCNC: 141 MMOL/L (ref 135–147)
TSH SERPL DL<=0.05 MIU/L-ACNC: 2.25 UIU/ML (ref 0.45–4.5)
VIT B12 SERPL-MCNC: 642 PG/ML (ref 180–914)

## 2024-08-06 PROCEDURE — 82607 VITAMIN B-12: CPT

## 2024-08-06 PROCEDURE — 82306 VITAMIN D 25 HYDROXY: CPT

## 2024-08-06 PROCEDURE — 82746 ASSAY OF FOLIC ACID SERUM: CPT

## 2024-08-06 PROCEDURE — 80048 BASIC METABOLIC PNL TOTAL CA: CPT

## 2024-08-06 PROCEDURE — G2211 COMPLEX E/M VISIT ADD ON: HCPCS | Performed by: FAMILY MEDICINE

## 2024-08-06 PROCEDURE — 83036 HEMOGLOBIN GLYCOSYLATED A1C: CPT

## 2024-08-06 PROCEDURE — 99214 OFFICE O/P EST MOD 30 MIN: CPT | Performed by: FAMILY MEDICINE

## 2024-08-06 PROCEDURE — 36415 COLL VENOUS BLD VENIPUNCTURE: CPT

## 2024-08-06 PROCEDURE — 84443 ASSAY THYROID STIM HORMONE: CPT

## 2024-08-06 RX ORDER — ALBUTEROL SULFATE 90 UG/1
2 AEROSOL, METERED RESPIRATORY (INHALATION) EVERY 6 HOURS PRN
Qty: 6.7 G | Refills: 5 | Status: SHIPPED | OUTPATIENT
Start: 2024-08-06

## 2024-08-06 NOTE — PATIENT INSTRUCTIONS
Please get your blood work completed   Please schedule your CT scan and your sleep medicine referral

## 2024-08-06 NOTE — PROGRESS NOTES
Ambulatory Visit  Name: Karin Nieves      : 1950      MRN: [id number]  Encounter Provider: Debby Dawn MD  Encounter Date: 2024   Encounter department: [facility]    Assessment & Plan   1. Pre-diabetes  -     Hemoglobin A1C; Future  -     TSH, 3rd generation with Free T4 reflex; Future  2. Blurred vision  Comments:  reports out of focus x 2 weeks. Denies pain or loss of vision. Recommend to see opthalmology within then next 1-2 weeks  Orders:  -     Ambulatory Referral to Ophthalmology; Future  3. Reactive airway disease without complication, unspecified asthma severity, unspecified whether persistent  -     albuterol (Proventil HFA) 90 mcg/act inhaler; Inhale 2 puffs every 6 (six) hours as needed for wheezing  4. Other fatigue  Comments:  moderate risk for stop bang assessment. tired after full night of sleep. daughter reports irregular breathing during sleep  Orders:  -     Ambulatory Referral to Sleep Medicine; Future  -     Vitamin B12/Folate, Serum Panel; Future  -     Vitamin D 25 hydroxy; Future  5. Body mass index (BMI) 40.0-44.9, adult (HCC)  -     Vitamin D 25 hydroxy; Future  6. Bony abnormality  Comments:  since fall from bed a few months ago, tender at times. follow up imaging  Orders:  -     CT head wo contrast; Future; Expected date: 2024  7. Essential hypertension  Assessment & Plan:  Goal is < 140/90, today it is 130/82  Home medications include: losartan 100 mg daily, lasix 20 mg daily, lopressor 100 mg BID  Patient admits to be compliant with medications.  Patient's blood pressure is controlled.   Patient denies any side effects with medications.     - Patient educated on the importance of weight loss, and appropriate dieting (low salt).   - Continue current medications   8. Nodule of finger of left hand  Comments:  started last summer after injury with steel wool doing dishes. Declined treatment at that time. Has grown and now  impedes function.  Orders:  -     Ambulatory Referral to Orthopedic Surgery; Future    BMI Counseling: Body mass index is 43.09 kg/m². The BMI is above normal. Nutrition recommendations include decreasing portion sizes, decreasing fast food intake, consuming healthier snacks, limiting drinks that contain sugar, moderation in carbohydrate intake, increasing intake of lean protein, reducing intake of saturated and trans fat and reducing intake of cholesterol. Exercise recommendations include moderate physical activity 150 minutes/week, exercising 3-5 times per week and strength training exercises. Patient referred to PCP. Rationale for BMI follow-up plan is due to patient being overweight or obese.     Falls Plan of Care: balance, strength, and gait training instructions were provided.       History of Present Illness     This is a very pleasant 73 year old female who presents here today with her daughter Kary. She has a few concerns she would like to discuss today.     She has a growth on finger of left had which is uncomfortable, mostly when trying to  her coffee mug. She has tried breaking it open on her own but was educated on risks associated with that and advised to avoid this, she would like removal.   Vision is out of focus. When she is reading she will see double vision intermittently, this started 3 weeks ago. Described as out of focus which makes her nauseated. She feels her eyes tire quickly and she is not driving at the moment. Additionally she tears when watching TV. Has not seen eye doctor.     Lump on side of head on left temporal. Worried about her family history of tumors, sister had a tumor on frontal lobe, and mother had brain tumor as well. She reports has fallen more frequently more then 5 times in past 6 months. Hit head last time but did not get any head imaging afterwards and does not endorse LOC.         Review of Systems   Constitutional:  Positive for fatigue. Negative for appetite  "change, chills and fever.   HENT:  Negative for congestion and postnasal drip.    Eyes:  Positive for visual disturbance. Negative for pain, discharge and redness.   Respiratory:  Negative for cough, choking, chest tightness and shortness of breath.    Cardiovascular:  Positive for leg swelling (chronic). Negative for chest pain and palpitations.   Gastrointestinal:  Negative for abdominal pain, constipation, diarrhea and vomiting.   Endocrine: Negative for polyuria.   Musculoskeletal:  Negative for arthralgias.   Skin:  Negative for rash.   Neurological:  Negative for dizziness, syncope, weakness, light-headedness and headaches.   Psychiatric/Behavioral:  Positive for sleep disturbance. Negative for dysphoric mood, self-injury and suicidal ideas. The patient is not nervous/anxious.        Objective     /82 (BP Location: Left arm, Patient Position: Sitting, Cuff Size: Large)   Pulse 56   Temp 98 °F (36.7 °C) (Tympanic)   Resp 18   Ht 5' 6\" (1.676 m)   Wt 121 kg (267 lb)   SpO2 98%   BMI 43.09 kg/m²     Physical Exam  Vitals and nursing note reviewed.   Constitutional:       General: She is not in acute distress.     Appearance: Normal appearance. She is well-developed.   HENT:      Head: Normocephalic and atraumatic.      Right Ear: External ear normal.      Left Ear: External ear normal.      Nose: No congestion.      Mouth/Throat:      Mouth: Mucous membranes are moist.      Pharynx: Oropharynx is clear.   Eyes:      Extraocular Movements: Extraocular movements intact and EOM normal.      Conjunctiva/sclera: Conjunctivae normal.   Cardiovascular:      Rate and Rhythm: Normal rate and regular rhythm.      Pulses: Normal pulses.      Heart sounds: Normal heart sounds. No murmur heard.  Pulmonary:      Effort: Pulmonary effort is normal. No respiratory distress.      Breath sounds: Normal breath sounds. No wheezing or rales.   Abdominal:      General: There is no distension.   Musculoskeletal:         " General: Normal range of motion.      Right hand: Normal.      Left hand: Deformity (swelling on dorsal aspect of index finger) present. No lacerations, tenderness or bony tenderness. Normal range of motion. Normal strength. Normal sensation.      Cervical back: Normal range of motion and neck supple.      Right lower leg: No edema.      Left lower leg: No edema.   Skin:     General: Skin is warm and dry.      Capillary Refill: Capillary refill takes less than 2 seconds.   Neurological:      General: No focal deficit present.      Mental Status: She is alert and oriented to person, place, and time.      Cranial Nerves: Cranial nerves 2-12 are intact.      Sensory: Sensation is intact.      Motor: Motor function is intact.      Coordination: Coordination is intact.      Gait: Gait is intact. Gait normal.   Psychiatric:         Mood and Affect: Mood and affect and mood normal.         Behavior: Behavior normal.       Administrative Statements

## 2024-08-07 NOTE — ASSESSMENT & PLAN NOTE
Goal is < 140/90, today it is 130/82  Home medications include: losartan 100 mg daily, lasix 20 mg daily, lopressor 100 mg BID  Patient admits to be compliant with medications.  Patient's blood pressure is controlled.   Patient denies any side effects with medications.     - Patient educated on the importance of weight loss, and appropriate dieting (low salt).   - Continue current medications

## 2024-08-14 DIAGNOSIS — M85.859 OSTEOPENIA OF NECK OF FEMUR, UNSPECIFIED LATERALITY: Primary | ICD-10-CM

## 2024-08-14 RX ORDER — LANOLIN ALCOHOL/MO/W.PET/CERES
1 CREAM (GRAM) TOPICAL
Qty: 30 TABLET | Refills: 0 | Status: SHIPPED | OUTPATIENT
Start: 2024-08-14 | End: 2024-08-24

## 2024-08-22 ENCOUNTER — APPOINTMENT (EMERGENCY)
Dept: RADIOLOGY | Facility: HOSPITAL | Age: 74
DRG: 092 | End: 2024-08-22
Payer: COMMERCIAL

## 2024-08-22 ENCOUNTER — APPOINTMENT (OUTPATIENT)
Dept: RADIOLOGY | Facility: HOSPITAL | Age: 74
DRG: 092 | End: 2024-08-22
Payer: COMMERCIAL

## 2024-08-22 ENCOUNTER — HOSPITAL ENCOUNTER (INPATIENT)
Facility: HOSPITAL | Age: 74
LOS: 1 days | Discharge: HOME | DRG: 092 | End: 2024-08-24
Attending: EMERGENCY MEDICINE | Admitting: INTERNAL MEDICINE
Payer: COMMERCIAL

## 2024-08-22 DIAGNOSIS — Q79.9 BONY ABNORMALITY: ICD-10-CM

## 2024-08-22 DIAGNOSIS — S09.93XA FACIAL TRAUMA, INITIAL ENCOUNTER: Primary | ICD-10-CM

## 2024-08-22 DIAGNOSIS — R26.2 AMBULATORY DYSFUNCTION: ICD-10-CM

## 2024-08-22 DIAGNOSIS — D32.9 MENINGIOMA (HCC): ICD-10-CM
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
## 2024-08-22 DIAGNOSIS — S01.512A LACERATION OF ORAL CAVITY, INITIAL ENCOUNTER: ICD-10-CM

## 2024-08-22 DIAGNOSIS — T14.8XXA ABRASION: ICD-10-CM

## 2024-08-22 DIAGNOSIS — R29.90 STROKE-LIKE SYMPTOMS: ICD-10-CM

## 2024-08-22 DIAGNOSIS — K59.00 CONSTIPATION: ICD-10-CM

## 2024-08-22 DIAGNOSIS — I10 ESSENTIAL HYPERTENSION: ICD-10-CM

## 2024-08-22 LAB
ALBUMIN SERPL BCG-MCNC: 3.6 G/DL (ref 3.5–5)
ALP SERPL-CCNC: 107 U/L (ref 34–104)
ALT SERPL W P-5'-P-CCNC: 24 U/L (ref 7–52)
ANION GAP SERPL CALCULATED.3IONS-SCNC: 5 MMOL/L (ref 4–13)
AST SERPL W P-5'-P-CCNC: 31 U/L (ref 13–39)
ATRIAL RATE: 57 BPM
BASOPHILS # BLD AUTO: 0.06 THOUSANDS/ÂΜL (ref 0–0.1)
BASOPHILS NFR BLD AUTO: 1 % (ref 0–1)
BILIRUB SERPL-MCNC: 0.57 MG/DL (ref 0.2–1)
BUN SERPL-MCNC: 15 MG/DL (ref 5–25)
CALCIUM SERPL-MCNC: 8.8 MG/DL (ref 8.4–10.2)
CHLORIDE SERPL-SCNC: 107 MMOL/L (ref 96–108)
CO2 SERPL-SCNC: 26 MMOL/L (ref 21–32)
CREAT SERPL-MCNC: 0.6 MG/DL (ref 0.6–1.3)
EOSINOPHIL # BLD AUTO: 0.16 THOUSAND/ÂΜL (ref 0–0.61)
EOSINOPHIL NFR BLD AUTO: 2 % (ref 0–6)
ERYTHROCYTE [DISTWIDTH] IN BLOOD BY AUTOMATED COUNT: 12.7 % (ref 11.6–15.1)
GFR SERPL CREATININE-BSD FRML MDRD: 91 ML/MIN/1.73SQ M
GLUCOSE SERPL-MCNC: 119 MG/DL (ref 65–140)
HCT VFR BLD AUTO: 40.7 % (ref 34.8–46.1)
HGB BLD-MCNC: 13.3 G/DL (ref 11.5–15.4)
IMM GRANULOCYTES # BLD AUTO: 0.03 THOUSAND/UL (ref 0–0.2)
IMM GRANULOCYTES NFR BLD AUTO: 0 % (ref 0–2)
LYMPHOCYTES # BLD AUTO: 1.13 THOUSANDS/ÂΜL (ref 0.6–4.47)
LYMPHOCYTES NFR BLD AUTO: 14 % (ref 14–44)
MAGNESIUM SERPL-MCNC: 2.4 MG/DL (ref 1.9–2.7)
MCH RBC QN AUTO: 30.7 PG (ref 26.8–34.3)
MCHC RBC AUTO-ENTMCNC: 32.7 G/DL (ref 31.4–37.4)
MCV RBC AUTO: 94 FL (ref 82–98)
MONOCYTES # BLD AUTO: 0.57 THOUSAND/ÂΜL (ref 0.17–1.22)
MONOCYTES NFR BLD AUTO: 7 % (ref 4–12)
NEUTROPHILS # BLD AUTO: 6.33 THOUSANDS/ÂΜL (ref 1.85–7.62)
NEUTS SEG NFR BLD AUTO: 76 % (ref 43–75)
NRBC BLD AUTO-RTO: 0 /100 WBCS
P AXIS: 53 DEGREES
PLATELET # BLD AUTO: 182 THOUSANDS/UL (ref 149–390)
PMV BLD AUTO: 9.8 FL (ref 8.9–12.7)
POTASSIUM SERPL-SCNC: 4.5 MMOL/L (ref 3.5–5.3)
PR INTERVAL: 164 MS
PROT SERPL-MCNC: 6.1 G/DL (ref 6.4–8.4)
QRS AXIS: 31 DEGREES
QRSD INTERVAL: 94 MS
QT INTERVAL: 450 MS
QTC INTERVAL: 438 MS
RBC # BLD AUTO: 4.33 MILLION/UL (ref 3.81–5.12)
SODIUM SERPL-SCNC: 138 MMOL/L (ref 135–147)
T WAVE AXIS: 66 DEGREES
VENTRICULAR RATE: 57 BPM
WBC # BLD AUTO: 8.28 THOUSAND/UL (ref 4.31–10.16)

## 2024-08-22 PROCEDURE — 99285 EMERGENCY DEPT VISIT HI MDM: CPT

## 2024-08-22 PROCEDURE — 70486 CT MAXILLOFACIAL W/O DYE: CPT

## 2024-08-22 PROCEDURE — 85025 COMPLETE CBC W/AUTO DIFF WBC: CPT | Performed by: EMERGENCY MEDICINE

## 2024-08-22 PROCEDURE — 97167 OT EVAL HIGH COMPLEX 60 MIN: CPT

## 2024-08-22 PROCEDURE — 83735 ASSAY OF MAGNESIUM: CPT

## 2024-08-22 PROCEDURE — 87081 CULTURE SCREEN ONLY: CPT | Performed by: INTERNAL MEDICINE

## 2024-08-22 PROCEDURE — 72125 CT NECK SPINE W/O DYE: CPT

## 2024-08-22 PROCEDURE — 70496 CT ANGIOGRAPHY HEAD: CPT

## 2024-08-22 PROCEDURE — 93005 ELECTROCARDIOGRAM TRACING: CPT

## 2024-08-22 PROCEDURE — 36415 COLL VENOUS BLD VENIPUNCTURE: CPT | Performed by: EMERGENCY MEDICINE

## 2024-08-22 PROCEDURE — 99285 EMERGENCY DEPT VISIT HI MDM: CPT | Performed by: EMERGENCY MEDICINE

## 2024-08-22 PROCEDURE — 90471 IMMUNIZATION ADMIN: CPT

## 2024-08-22 PROCEDURE — 99223 1ST HOSP IP/OBS HIGH 75: CPT | Performed by: INTERNAL MEDICINE

## 2024-08-22 PROCEDURE — 93010 ELECTROCARDIOGRAM REPORT: CPT | Performed by: INTERNAL MEDICINE

## 2024-08-22 PROCEDURE — 70498 CT ANGIOGRAPHY NECK: CPT

## 2024-08-22 PROCEDURE — 80053 COMPREHEN METABOLIC PANEL: CPT | Performed by: EMERGENCY MEDICINE

## 2024-08-22 PROCEDURE — 70450 CT HEAD/BRAIN W/O DYE: CPT

## 2024-08-22 PROCEDURE — 73110 X-RAY EXAM OF WRIST: CPT

## 2024-08-22 PROCEDURE — 97163 PT EVAL HIGH COMPLEX 45 MIN: CPT

## 2024-08-22 PROCEDURE — 90715 TDAP VACCINE 7 YRS/> IM: CPT | Performed by: EMERGENCY MEDICINE

## 2024-08-22 RX ORDER — PANTOPRAZOLE SODIUM 40 MG/1
40 TABLET, DELAYED RELEASE ORAL DAILY
Status: DISCONTINUED | OUTPATIENT
Start: 2024-08-23 | End: 2024-08-24 | Stop reason: HOSPADM

## 2024-08-22 RX ORDER — ATORVASTATIN CALCIUM 40 MG/1
40 TABLET, FILM COATED ORAL EVERY EVENING
Status: DISCONTINUED | OUTPATIENT
Start: 2024-08-22 | End: 2024-08-24 | Stop reason: HOSPADM

## 2024-08-22 RX ORDER — CITALOPRAM HYDROBROMIDE 20 MG/1
40 TABLET ORAL DAILY
Status: DISCONTINUED | OUTPATIENT
Start: 2024-08-23 | End: 2024-08-24 | Stop reason: HOSPADM

## 2024-08-22 RX ORDER — ENOXAPARIN SODIUM 100 MG/ML
40 INJECTION SUBCUTANEOUS 2 TIMES DAILY
Status: DISCONTINUED | OUTPATIENT
Start: 2024-08-22 | End: 2024-08-24 | Stop reason: HOSPADM

## 2024-08-22 RX ORDER — METOPROLOL TARTRATE 50 MG
100 TABLET ORAL 2 TIMES DAILY
Status: CANCELLED | OUTPATIENT
Start: 2024-08-22

## 2024-08-22 RX ORDER — LANOLIN ALCOHOL/MO/W.PET/CERES
1 CREAM (GRAM) TOPICAL
Status: DISCONTINUED | OUTPATIENT
Start: 2024-08-23 | End: 2024-08-24 | Stop reason: HOSPADM

## 2024-08-22 RX ORDER — ALBUTEROL SULFATE 90 UG/1
2 AEROSOL, METERED RESPIRATORY (INHALATION) EVERY 6 HOURS PRN
Status: DISCONTINUED | OUTPATIENT
Start: 2024-08-22 | End: 2024-08-24 | Stop reason: HOSPADM

## 2024-08-22 RX ORDER — ASPIRIN 81 MG/1
81 TABLET, CHEWABLE ORAL DAILY
Status: DISCONTINUED | OUTPATIENT
Start: 2024-08-23 | End: 2024-08-24 | Stop reason: HOSPADM

## 2024-08-22 RX ORDER — METOPROLOL TARTRATE 100 MG
100 TABLET ORAL 2 TIMES DAILY
Status: DISCONTINUED | OUTPATIENT
Start: 2024-08-22 | End: 2024-08-23

## 2024-08-22 RX ORDER — ACETAMINOPHEN 325 MG/1
975 TABLET ORAL ONCE
Status: COMPLETED | OUTPATIENT
Start: 2024-08-22 | End: 2024-08-22

## 2024-08-22 RX ORDER — HYDRALAZINE HYDROCHLORIDE 20 MG/ML
5 INJECTION INTRAMUSCULAR; INTRAVENOUS EVERY 6 HOURS PRN
Status: DISCONTINUED | OUTPATIENT
Start: 2024-08-22 | End: 2024-08-23

## 2024-08-22 RX ORDER — LIDOCAINE 50 MG/G
1 PATCH TOPICAL
Status: DISCONTINUED | OUTPATIENT
Start: 2024-08-22 | End: 2024-08-24 | Stop reason: HOSPADM

## 2024-08-22 RX ORDER — LOSARTAN POTASSIUM 50 MG/1
100 TABLET ORAL DAILY
Status: CANCELLED | OUTPATIENT
Start: 2024-08-23

## 2024-08-22 RX ADMIN — ACETAMINOPHEN 975 MG: 325 TABLET ORAL at 18:11

## 2024-08-22 RX ADMIN — ENOXAPARIN SODIUM 40 MG: 40 INJECTION SUBCUTANEOUS at 18:26

## 2024-08-22 RX ADMIN — ACETAMINOPHEN 975 MG: 325 TABLET ORAL at 11:04

## 2024-08-22 RX ADMIN — TETANUS TOXOID, REDUCED DIPHTHERIA TOXOID AND ACELLULAR PERTUSSIS VACCINE, ADSORBED 0.5 ML: 5; 2.5; 8; 8; 2.5 SUSPENSION INTRAMUSCULAR at 11:05

## 2024-08-22 RX ADMIN — ATORVASTATIN CALCIUM 40 MG: 40 TABLET, FILM COATED ORAL at 18:26

## 2024-08-22 RX ADMIN — IOHEXOL 85 ML: 350 INJECTION, SOLUTION INTRAVENOUS at 16:45

## 2024-08-22 RX ADMIN — METOPROLOL TARTRATE 100 MG: 50 TABLET, FILM COATED ORAL at 20:00

## 2024-08-22 NOTE — OCCUPATIONAL THERAPY NOTE
Occupational Therapy Progress Note     Patient Name: Karin Nieves  Today's Date: 8/22/2024  Problem List  Active Problems:  There are no active Hospital Problems.          08/22/24 1348   OT Last Visit   OT Visit Date 08/22/24  (Thursday)   Note Type   Note type Evaluation  (Eval 2552-1133)   Additional Comments Identified pt by full name and birthdate   Pain Assessment   Pain Assessment Tool 0-10   Pain Score 5   Pain Location/Orientation Location: Hip   Multiple Pain Sites Yes   Pain 2   Pain Location/Orientation 2 Orientation: Left;Other (Comment)  (wrist w/ AROM and weight bearing)   Restrictions/Precautions   Weight Bearing Precautions Per Order No   Other Precautions Fall Risk;Pain  (recommend bed/ chair alarm)   Home Living   Type of Home House   Home Layout Two level;Ramped entrance;Performs ADLs on one level;Able to live on main level with bedroom/bathroom;1/2 bath on main level   Bathroom Shower/Tub   (pt reports sponge bathing at baseline; full bathroom up flight of stairs)   Bathroom Toilet Standard   Bathroom Equipment Grab bars around toilet   Bathroom Accessibility Accessible   Home Equipment Cane;Grab bars;Other (Comment)  (rollator)   Additional Comments Pt reports living alone w/ first floor set- up, ramped entrance and 1/2 bath   Prior Function   Level of [place] Independent with ADLs;Independent with functional mobility  (+ drive)   Lives With (S)  Alone   Receives Help From Family;Other (Comment)  (pt reports daughter, ex- able to assist if needed)   IADLs Independent with driving;Independent with meal prep;Independent with medication management  (Pt reports limited, local driving)   Falls in the last 6 months 1 to 4   Vocational Retired   Comments Pt reports I w/ ADLs using cane.   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   Intrinsic Gratification Pt reports that she does not do much, watches tv   General   Additional Pertinent History Pt presented w/ ED at [facility] via EMS on 8/22/24 following a fall while loading groceries into her car at [place]. L hand / wrist x-ray negative for fracture per MD   Family/Caregiver Present No   Additional General Comments Personal and environmental factors supporting performance includes first floor set-up, ramped entrance, access to AD, independent at baseline; barriers include lives alone, limited support, pain, fall history   Subjective   Subjective \"I am dizzy\" \"I have had double vision for about 2 weeks\"   ADL   Where Assessed Other (Comment)  (edge of stretcher in ED)   Eating Assistance 6  Modified independent   Eating Deficit Setup   Grooming Assistance 5  Supervision/Setup   Grooming Deficit Setup;Standing with assistive device;Supervision/safety;Increased time to complete   UB Bathing Assistance Unable to assess  (anticipate S seated after set- up)   LB Bathing Assistance Unable to assess  (anticipate S based on fxal obs skills, clinical judgement)   UB Dressing Assistance 6  Modified independent   UB Dressing Deficit Setup;Increased time to complete  (seated unusupported at edge of stretcher w/ + time)   LB Dressing Assistance 5  Supervision/Setup   LB Dressing Deficit Setup;Requires assistive device for steadying;Increased time to complete;Supervision/safety;Verbal cueing   Toileting Assistance  Unable to assess  (denied need to void; reports walking to the bathroom w/ RN staff using cane earlier. Pt added that she felt unsteady \"wobbly\")   Additional Comments -107 seated at EOB R forearm. MD aware   Bed Mobility   Supine to Sit 4  Minimal assistance   Additional items Assist x 1;HOB elevated;Bedrails;Increased time required;Verbal cues  (to pt's R to simulate home environment)   Sit to Supine 5  Supervision   Additional items Assist x 1;Increased time " required;Bedrails   Additional Comments Pt supine head of stretcher elevated post eval w/ needs met, call bell in reach   Transfers   Sit to Stand 5  Supervision   Additional items Assist x 1;Increased time required   Stand to Sit 5  Supervision   Additional items Assist x 1;Increased time required   Additional Comments Pt reports dizzines upon sitting at EOB and worse following functional mobility. Pt reports symptoms improved upon return to stretcher (supine)   Functional Mobility   Functional Mobility 5  Supervision   Additional Comments engaged in functional mobility using RW w/ min A initially and progressed to S w/ + time > than household distances.   Additional items Rolling walker   Balance   Static Sitting Fair +   Dynamic Sitting Fair   Static Standing Fair   Ambulatory Fair -  (using RW)   Activity Tolerance   Activity Tolerance Patient limited by fatigue;Patient limited by pain;Other (Comment)  (dizziness)   Medical Staff Made Aware care coordination w/ PT, [place] due to decreased activity tolerance, regression from baseline. Spoke w/ Dr. Etienne TALBOT regarding pt's symptoms   Nurse Made Aware spoke w/ RN   RUE Assessment   RUE Assessment WFL   RUE Strength   RUE Overall Strength Within Functional Limits - able to perform ADL tasks with strength   LUE Assessment   LUE Assessment WFL   LUE Strength   LUE Overall Strength Within Functional Limits - able to perform ADL tasks with strength  (able to participate in ADLs. Pt reports residual deficits L UE/LE s/p CVA)   Hand Function   Gross Motor Coordination Functional   Fine Motor Coordination Impaired   Hand Function Comments due to wrist / hand pain   Vision-Basic Assessment   Patient Visual Report Diplopia   Vision - Complex Assessment   Acuity   (difficulty reading white board)   Additional Comments Pt reports double vision X 2 weeks   Perception   Inattention/Neglect Appears intact   Motor Planning Appears intact   Perseveration Not present   Cognition  "  Overall Cognitive Status   (alert, generally oriented, and able to follow directions. Pt reports difficulty shifting attention at baseline since CVA)   Arousal/Participation Alert;Cooperative   Attention Attends with cues to redirect   Orientation Level Oriented to person;Oriented to place;Oriented to situation  (generally oriented to time. Pt stated \"I did not know the date until I got here\")   Memory Within functional limits   Following Commands Follows multistep commands without difficulty   Comments Alert, coopertative and able to follow directions. Pt reports \"difficulty mutli-tasking since stroke\"   Assessment   Limitation Decreased ADL status;Decreased endurance;Decreased self-care trans;Decreased high-level ADLs;Visual deficit  (impaired pain, activity tolerance, standing tolerance, balance)   Assessment Pt is a 73yo female presented [facility] via DME following a fall. X-ray / imaging negative for traumatic injury / fracture. Pt reports living alone w/ first floor set- up and ramped entrance. Pt reports using cane for functional mobility and sponge bathes. Significant PMH Impacting her occupational performance includes chronic pain, anemia, anxiety/depression, hx CVA, CHF, spinal stenosis. Upon eval, pt alert and generally oriented on room air. Pt reports R hand dominance and mild L UE/LE residual deficits s/p CVA. Pt required min A to complete bed mobility supine to sit at edge of stretcher to her R. Pt required S sit <> stand, min A --> S using RW functional mobility, S grooming, mod I UBD, S LBD. Pt demonstrated decreased activity tolerance, standing tolerance, balance and reported pain, dizziness, double vision. Pt is completing ADLs below baseline level of I. Recommend level III rehab resource intensity when medically stable for discharge using RW. Will continue to follow   Goals   Patient Goals Pt stated that she would like to return home to baseline level of I, and added that today she " wants to sleep   Plan   Treatment Interventions ADL retraining;Functional transfer training;UE strengthening/ROM;Endurance training;Patient/family training;Equipment evaluation/education;Compensatory technique education;Continued evaluation;Energy conservation;Activityengagement   Goal Expiration Date 08/29/24   OT Treatment Day 0  (Thursday 8/22/24)   OT Frequency 2-3x/wk   Discharge Recommendation   Rehab Resource Intensity Level, OT III (Minimum Resource Intensity)   AM-PAC Daily Activity Inpatient   Lower Body Dressing 3   Bathing 3   Toileting 3   Upper Body Dressing 4   Grooming 4   Eating 4   Daily Activity Raw Score 21   Daily Activity Standardized Score (Calc for Raw Score >=11) 44.27   AM-PAC Applied Cognition Inpatient   Following a Speech/Presentation 4   Understanding Ordinary Conversation 4   Taking Medications 4   Remembering Where Things Are Placed or Put Away 4   Remembering List of 4-5 Errands 3   Taking Care of Complicated Tasks 3   Applied Cognition Raw Score 22   Applied Cognition Standardized Score 47.83   Barthel Index   Feeding 10   Bathing 0   Grooming Score 5   Dressing Score 5   Bladder Score 10   Bowels Score 10   Toilet Use Score 5   Transfers (Bed/Chair) Score 10   Mobility (Level Surface) Score 10   Stairs Score 0   Barthel Index Score 65   End of Consult   Patient Position at End of Consult Supine;All needs within reach   Nurse Communication Nurse aware of consult   Licensure   [place] License Number  Brenna Schmidt, OTR/L [id number]       The patient's raw score on the AM-PAC Daily Activity Inpatient Short Form is 21. A raw score of greater than or equal to 19 suggests the patient may benefit from discharge to home. Please refer to the recommendation of the Occupational Therapist for safe discharge planning.      Pt goals to be met by 8/29/24 to max I w/ ADLs and improve engagement to return home to baseline level of I includes:    -Pt will complete functional transfers to bed,  chair, and toilet using LRAD, DME as needed independently    -Pt will consistently engage in functional mobility using LRAD household distances independently    -Pt will complete LBD w/ mod I    -Pt will complete UBD independently    -Pt will demonstrate improved functional standing tolerance for at least 15 minutes using LRAD w/ at least fair + balance while engaged in grooming standing w/ mod I    -Pt will demonstrate improved AMPAC score to at least 23 to max I w/ ADLs, assist w/ DC Planning    -Pt will demonstrate improved activity and sitting tolerance OOB in chair for all meals    -Pt will demonstrate good attention and understanding EC tech and compensatory strategies as needed to assist in DC planning, improve engagement to return home to [facility]    Brenna Schmidt, OTR/L  [id number]  [id number]

## 2024-08-22 NOTE — DISCHARGE INSTRUCTIONS
Follow-up with primary care for further care. Contact info provided below if needed.  Use over the counter medications as stated on the bottle as needed for pain control.  Keep wounds clean.  Rinse mouth after eating until laceration healed.  Return to the ED with new or worsening symptoms.

## 2024-08-22 NOTE — ED PROVIDER NOTES
"History  Chief Complaint   Patient presents with    Fall     Patient lost footing in parking lot while shopping, falling on face and left wrist. Denies LOC, thinners. C/o wrist pain, headache, face.     Pt is a 75yo F who presents for fall.  Patient reports that she loaded her groceries into her car and thought she closed her car door.  Patient reports that she did not and due to this subsequently fell.  Patient states that she did not have any prodromal symptoms including lightheadedness or dizziness prior to the fall.  Patient reports she fell face first and struck her face on the ground.  Patient did not lose consciousness.  Patient reports pain in the entirety of her face and describes it as \"throbbing\".  Patient states she also has a laceration to the inner aspect of her upper lip.  Patient also reporting pain to her left wrist but states she is able to move it and therefore does not think it is broken.  Patient reports that she does not take any aspirin or blood thinners.  Patient reports she is on high blood pressure medication that she takes regularly.  Patient ambulates with a cane at baseline.        Prior to Admission Medications   Prescriptions Last Dose Informant Patient Reported? Taking?   Calcium Carb-Cholecalciferol (calcium carbonate-vitamin D) 500 mg-5 mcg tablet   No No   Sig: Take 1 tablet by mouth once daily with breakfast   DULoxetine (Cymbalta) 30 mg delayed release capsule   No No   Sig: Take 1 capsule (30 mg total) by mouth daily   albuterol (Proventil HFA) 90 mcg/act inhaler   No No   Sig: Inhale 2 puffs every 6 (six) hours as needed for wheezing   calcium carbonate-vitamin D 500 mg-5 mcg per tablet   No No   Sig: Take 1 tablet by mouth daily with breakfast   citalopram (CeleXA) 40 mg tablet   No No   Sig: Take 1 tablet (40 mg total) by mouth daily   furosemide (LASIX) 20 mg tablet   No No   Sig: Take 1 tablet (20 mg total) by mouth daily As needed   lidocaine (Lidoderm) 5 %   No No   Sig: " Apply 1 patch topically over 12 hours daily Remove & Discard patch within 12 hours or as directed by MD   losartan (COZAAR) 100 MG tablet   No No   Sig: Take 1 tablet (100 mg total) by mouth daily   metoprolol tartrate (LOPRESSOR) 100 mg tablet   No No   Sig: Take 1 tablet (100 mg total) by mouth 2 (two) times a day   naproxen (Naprosyn) 500 mg tablet   No No   Sig: Take 1 tablet (500 mg total) by mouth 2 (two) times a day with meals   pantoprazole (PROTONIX) 40 mg tablet   No No   Sig: Take 1 tablet (40 mg total) by mouth daily      Facility-Administered Medications: None       Past Medical History:   Diagnosis Date    Anemia     Anxiety disorder     Arthritis     Blood disorder     Bronchitis     Chronic pain     Depression     Dizziness     GERD (gastroesophageal reflux disease)     History of transfusion     Hypertension     Kidney stone     Kidney stones     Lightheadedness     Obesity     SOB (shortness of breath)     Stroke (HCC)        Past Surgical History:   Procedure Laterality Date    ABDOMINAL SURGERY      APPENDECTOMY      CHOLECYSTECTOMY      EGD AND COLONOSCOPY N/A 07/11/2016    Procedure: EGD AND COLONOSCOPY;  Surgeon: Yousif Lord MD;  Location: [facility] GI LAB;  Service:     EPIDURAL BLOCK INJECTION Right 07/13/2017    Procedure: BLOCK / INJECTION EPIDURAL STEROID TRANSFORAMINAL  L4-5;  Surgeon: Surendra Siu MD;  Location: [facility] MAIN OR;  Service: Pain Management     EPIDURAL BLOCK INJECTION Right 08/09/2017    Procedure: BLOCK / INJECTION EPIDURAL STEROID TRANSFORAMINAL L4-5- REPEAT;  Surgeon: Surendra Siu MD;  Location: [facility] MAIN OR;  Service: Pain Management     EPIDURAL BLOCK INJECTION Left 02/16/2018    Procedure: LEFT L4 AND L5 TRANSFORAMINAL EPIDURAL STEROID INJECTION;  Surgeon: Surendra Siu MD;  Location: [facility] MAIN OR;  Service: Pain Management     EYE SURGERY      GASTRIC BYPASS      MAMMO (HISTORICAL) Bilateral 05/06/2021    [place] CYSTO/URETERO W/LITHOTRIPSY &INDWELL STENT INSRT  Left 07/13/2016    Procedure: CYSTOSCOPY URETEROSCOPY WITH LITHOTRIPSY HOLMIUM LASER, RETROGRADE PYELOGRAM AND INSERTION STENT URETERAL;  Surgeon: Etienne Freeman MD;  Location: [facility];  Service: Urology    TONSILLECTOMY         Family History   Problem Relation Age of Onset    No Known Problems Maternal Aunt     No Known Problems Maternal Aunt     No Known Problems Paternal Aunt     No Known Problems Paternal Aunt     No Known Problems Paternal Aunt     No Known Problems Daughter     No Known Problems Daughter     Diabetes Family     Heart disease Family         coronary arteriosclerosis     I have reviewed and agree with the history as documented.    E-Cigarette/Vaping    E-Cigarette Use Never User      E-Cigarette/Vaping Substances    Nicotine No     THC No     CBD No     Flavoring No     Other No     Unknown No      Social History     Tobacco Use    Smoking status: Never    Smokeless tobacco: Never   Vaping Use    Vaping status: Never Used   Substance Use Topics    Alcohol use: Never    Drug use: No     Physical Exam  Physical Exam  Vitals reviewed.   Constitutional:       General: She is not in acute distress.     Appearance: She is well-developed. She is not toxic-appearing or diaphoretic.   HENT:      Head: Normocephalic.        Right Ear: External ear normal.      Left Ear: External ear normal.      Mouth/Throat:      Pharynx: Oropharynx is clear.      Comments: 1cm stellate laceration inside upper lip; No active bleeding; Not through and through  Eyes:      Extraocular Movements: Extraocular movements intact.      Pupils: Pupils are equal, round, and reactive to light.   Cardiovascular:      Rate and Rhythm: Normal rate and regular rhythm.      Heart sounds: Normal heart sounds. No murmur heard.  Pulmonary:      Effort: Pulmonary effort is normal. No respiratory distress.      Breath sounds: Normal breath sounds.   Chest:      Chest wall: No tenderness.   Abdominal:      General: There is no  distension.      Palpations: Abdomen is soft.      Tenderness: There is no abdominal tenderness.   Musculoskeletal:      Left wrist: Tenderness present. No swelling or deformity. Decreased range of motion.      Cervical back: Normal range of motion and neck supple.   Skin:     General: Skin is warm and dry.      Capillary Refill: Capillary refill takes less than 2 seconds.      Coloration: Skin is not pale.   Neurological:      General: No focal deficit present.      Mental Status: She is alert and oriented to person, place, and time.   Psychiatric:         Speech: Speech normal.         Behavior: Behavior is cooperative.         Vital Signs  ED Triage Vitals [08/22/24 1031]   Temperature Pulse Respirations Blood Pressure SpO2   97.7 °F (36.5 °C) 57 18 (!) 181/83 96 %      Temp Source Heart Rate Source Patient Position - Orthostatic VS BP Location FiO2 (%)   Tympanic Monitor Lying Right arm --      Pain Score       8           Vitals:    08/22/24 1031   BP: (!) 181/83   Pulse: 57   Patient Position - Orthostatic VS: Lying         Visual Acuity  Visual Acuity      Flowsheet Row Most Recent Value   L Pupil Size (mm) 3   R Pupil Size (mm) 3            ED Medications  Medications   tetanus-diphtheria-acellular pertussis (BOOSTRIX) IM injection 0.5 mL (0.5 mL Intramuscular Given 8/22/24 1105)   acetaminophen (TYLENOL) tablet 975 mg (975 mg Oral Given 8/22/24 1104)       Diagnostic Studies  Results Reviewed       Procedure Component Value Units Date/Time    Comprehensive metabolic panel [[id number]]  (Abnormal) Collected: 08/22/24 1514    Lab Status: Final result Specimen: Blood from Hand, Left Updated: 08/22/24 1552     Sodium 138 mmol/L      Potassium 4.5 mmol/L      Chloride 107 mmol/L      CO2 26 mmol/L      ANION GAP 5 mmol/L      BUN 15 mg/dL      Creatinine 0.60 mg/dL      Glucose 119 mg/dL      Calcium 8.8 mg/dL      AST 31 U/L      ALT 24 U/L      Alkaline Phosphatase 107 U/L      Total Protein 6.1 g/dL       Albumin 3.6 g/dL      Total Bilirubin 0.57 mg/dL      eGFR 91 ml/min/1.73sq m     Narrative:      National Kidney Disease Foundation guidelines for Chronic Kidney Disease (CKD):     Stage 1 with normal or high GFR (GFR > 90 mL/min/1.73 square meters)    Stage 2 Mild CKD (GFR = 60-89 mL/min/1.73 square meters)    Stage 3A Moderate CKD (GFR = 45-59 mL/min/1.73 square meters)    Stage 3B Moderate CKD (GFR = 30-44 mL/min/1.73 square meters)    Stage 4 Severe CKD (GFR = 15-29 mL/min/1.73 square meters)    Stage 5 End Stage CKD (GFR <15 mL/min/1.73 square meters)  Note: GFR calculation is accurate only with a steady state creatinine    CBC and differential [[id number]]  (Abnormal) Collected: 08/22/24 1514    Lab Status: Final result Specimen: Blood from Hand, Left Updated: 08/22/24 1520     WBC 8.28 Thousand/uL      RBC 4.33 Million/uL      Hemoglobin 13.3 g/dL      Hematocrit 40.7 %      MCV 94 fL      MCH 30.7 pg      MCHC 32.7 g/dL      RDW 12.7 %      MPV 9.8 fL      Platelets 182 Thousands/uL      nRBC 0 /100 WBCs      Segmented % 76 %      Immature Grans % 0 %      Lymphocytes % 14 %      Monocytes % 7 %      Eosinophils Relative 2 %      Basophils Relative 1 %      Absolute Neutrophils 6.33 Thousands/µL      Absolute Immature Grans 0.03 Thousand/uL      Absolute Lymphocytes 1.13 Thousands/µL      Absolute Monocytes 0.57 Thousand/µL      Eosinophils Absolute 0.16 Thousand/µL      Basophils Absolute 0.06 Thousands/µL                    XR wrist 3+ views LEFT   Final Result by Gerard Carmona MD (08/22 1531)      No acute osseous abnormality.         Computerized Assisted Algorithm (CAA) may have been used to analyze all applicable images.            Workstation performed: [id number]         CT head wo contrast   Final Result by Tony Palomares MD (08/22 1552)      No acute intracranial abnormality.                  Workstation performed: [id number]         CT spine cervical without contrast   Final Result by Tony  Rome Palomares MD (08/22 1137)      No cervical spine fracture or traumatic malalignment.                  Workstation performed: [id number]         CT facial bones without contrast   Final Result by Tnoy Palomares MD (08/22 1141)      Normal noncontrast CT of the facial bones.               Workstation performed: [id number]         CT head wo contrast    (Results Pending)   CTA head and neck with and without contrast    (Results Pending)              Procedures  Procedures         ED Course  ED Course as of 08/22/24 1631   Thu Aug 22, 2024   1107 XR wrist 3+ views LEFT  No acute findings on wet read.    1135 CT head wo contrast  No acute intracranial abnormality.   1139 CT spine cervical without contrast  No cervical spine fracture or traumatic malalignment.   1143 CT facial bones without contrast  Normal noncontrast CT of the facial bones.   1146 Pt reassessed and made aware of all results. Pt expressed understanding. Will ambulate with cane prior to DC.    1154 Tech attempted to ambulate pt and states she was not steady. Will plan for PT/OT eval prior to dispo.    1327 PT at bedside.    1350 PT/OT evaluated pt. Stated that she would be okay for discharge but is complaining of dizziness. Pt previously denied any dizziness. Will reassess.   1415 Pt stating that she wasn't dizzy during the fall but has been having dizziness on and off for two weeks. Pt stating that she does not feel safe going home. Will obtain basic labs and admit to medicine if agreeable.    1521 CBC and differential(!)  Reviewed and without actionable derangement.    1557 Comprehensive metabolic panel(!)  Reviewed and without actionable derangement.                                  SBIRT 20yo+      Flowsheet Row Most Recent Value   Initial Alcohol Screen: US AUDIT-C     1. How often do you have a drink containing alcohol? 0 Filed at: 08/22/2024 1032   2. How many drinks containing alcohol do you have on a typical day you are drinking?  0  Filed at: 08/22/2024 1032   3b. FEMALE Any Age, or MALE 65+: How often do you have 4 or more drinks on one occassion? 0 Filed at: 08/22/2024 1032   Audit-C Score 0 Filed at: 08/22/2024 1032   JOVANY: How many times in the past year have you...    Used an illegal drug or used a prescription medication for non-medical reasons? Never Filed at: 08/22/2024 1032                      Medical Decision Making  Pt is a 73yo F who presents with facial trauma.     Differential diagnosis to include but not limited to fracture, dislocation, soft tissue injury, ligamentous injury.  Will plan for imaging.  Will treat symptomatically and reassess.  Will update tetanus. See ED course for results and details.    Plan to admit pt to FP. Pt discussed with admitting team and admission orders placed. Pt admitted without incident.       Amount and/or Complexity of Data Reviewed  Labs: ordered. Decision-making details documented in ED Course.  Radiology: ordered. Decision-making details documented in ED Course.    Risk  OTC drugs.  Prescription drug management.  Decision regarding hospitalization.                 Disposition  Final diagnoses:   Facial trauma, initial encounter   Fall, initial encounter   Abrasion   Laceration of oral cavity, initial encounter     Time reflects when diagnosis was documented in both MDM as applicable and the Disposition within this note       Time User Action Codes Description Comment    8/22/2024 10:38 AM Release User, Automatic Add [Q79.9] Bony abnormality since fall from bed a few months ago, tender at times. follow up imaging    8/22/2024 11:47 AM Nia Clifton Add [S09.93XA] Facial trauma, initial encounter     8/22/2024 11:47 AM Nia Clifton Add [W19.XXXA] Fall, initial encounter     8/22/2024 11:47 AM Nia Clifton Add [T14.8XXA] Abrasion     8/22/2024 11:48 AM Nia Clifton Add [S01.512A] Laceration of oral cavity, initial encounter           ED Disposition       ED Disposition   Admit     Condition   Stable    Date/Time   u Aug 22, 2024 8997    Comment   Case was discussed with FP and the patient's admission status was agreed to be Admission Status: observation status to the service of Dr. Sen.               Follow-up Information       Follow up With Specialties Details Why Contact Info    Debby Dawn MD Family Medicine Call on 8/23/2024  [address] [facility] [address]  [facility] [id number] [phone]              Patient's Medications   Discharge Prescriptions    No medications on file       No discharge procedures on file.    PDMP Review       None            ED Provider  Electronically Signed by             Nia Clifton MD  08/22/24 2130

## 2024-08-22 NOTE — ASSESSMENT & PLAN NOTE
Reports hx of prior TIA, macular degeneration. Pt reports falling onto face in [employer] parking lot after going to food [employer].  Patient states that she has had some dizziness and vision issues where she sees waves when looking linearly. Denies smoking, alcohol use. Denies chest pain, SOB, new smells, dizziness, weakness, light-headedness.     ED: CT head, CT facial bones and Xray left wrist unremarkable. Labs unremarkable.     Last echo on 5/21/23 showed EF 65% and G1DD.   Last lipid panel 2021 showed LDL 99, total chol 157    CTA head: No definite evidence for high-grade stenosis, focal occlusion or vascular aneurysm of the cervical or intracranial vessels. Atherosclerotic irregularity with mild to moderate narrowing of the basilar artery, Hyperdensity along the anterolateral aspect of the right oral cavity.     MRI brain: No acute infarct. Multiple old lacunar infarcts involving the bilateral basal ganglia, left thalamus, right paramedian midbrain, and bilateral cerebellar hemispheres. Old infarct is noted involving the periventricular corona radiata white matter of the right frontal lobe.  -Extra-axial FLAIR hyperintense lesion, along the right sphenoid wing, corresponding to the enhancing lesion identified on CT angiogram of the head and neck. This is most indicative of a meningioma. No centimeter genic edema or significant mass effect   noted.  -Mild chronic microangiopathic changes involving the white matter of both cerebral hemispheres.    Discharged with ASA 81 mg, lipitor 40 mg daily   Discharged with home medication metoprolol 100 mg twice daily, losartan 100 mg daily and Lasix 20 mg daily  8/23 Echocardiogram: EF 58%  Neurology consulted; recommendations appreciated  Continue ASA 81 and Lipitor 40 mg after discharge in setting of hyperlipidemia and prediabetes

## 2024-08-22 NOTE — ASSESSMENT & PLAN NOTE
Chronic    Home medication Citalopram 40 mg daily. Does not recall taking Cymbalta 30 mg daily.     Discharged with home medication

## 2024-08-22 NOTE — ASSESSMENT & PLAN NOTE
Chronic     BP elevated in ED. Home medication furosemide 20 mg daily PRN, losartan 100 mg daily, metoprolol 100 mg twice daily.     [employer]   Restarted all home medication and discharged with.  Discharged with 30 mg Nifedipine daily

## 2024-08-22 NOTE — ASSESSMENT & PLAN NOTE
States that she takes multivitamins, B12, prevangen and prevavision.     Last B12 642 and last folate 7.9 in 8/2024.    Continue multivitamin.

## 2024-08-22 NOTE — H&P
History and Physical - [facility]  Family Medicine Residency     Patient Information: Karin Nieves 74 y.o. female MRN: [id number]  Unit/Bed#: ED 08 Encounter: [id number]  Admitting Physician: Yolande Sen MD   PCP: Debby Dawn MD  Date of Admission:  08/22/24     Assessment and Plan     Stroke-like symptoms  Assessment & Plan  Reports hx of prior TIA, macular degeneration. Pt reports falling onto face in [employer] parking lot after going to food pantry.  Patient states that she has had some dizziness and vision issues where she sees waves when looking linearly. Denies smoking, alcohol use. Denies chest pain, SOB, new smells, dizziness, weakness, light-headedness.     ED: CT head, CT facial bones and Xray left wrist unremarkable. Labs unremarkable.     Last echo on 5/21/23 showed EF 65% and G1DD.   Last lipid panel 2021 showed LDL 99, total chol 157    CTA head: No definite evidence for high-grade stenosis, focal occlusion or vascular aneurysm of the cervical or intracranial vessels. Atherosclerotic irregularity with mild to moderate narrowing of the basilar artery, Hyperdensity along the anterolateral aspect of the right oral cavity.     MRI brain ordered  Continue ASA 81 mg   Continue lipitor 40 mg daily   Continue metoprolol 100 mg daily   Neurology consulted; recommendations appreciated    Gastroesophageal reflux disease  Assessment & Plan  Chronic.     Home medication protonix 40 mg daily.    Continue home medication    H/O bariatric surgery - bypass  Assessment & Plan  States that she takes multivitamins, B12, prevangen and prevavision.     Last B12 642 and last folate 7.9 in 8/2024.    Continue multivitamin.    Pre-diabetes  Assessment & Plan  Last A1c was 5.9 on 8/6/24.     Diet and exercise counseling.   Repeat in 6 months outpatient    Recurrent major depressive disorder, in full remission (HCC)  Assessment & Plan  See anxiety.     Anxiety disorder  Assessment &  Plan  Chronic    Home medication Citalopram 40 mg daily. Does not recall taking Cymbalta 30 mg daily.     Continue Citalopram 40 mg daily     Morbid obesity with BMI of 40.0-44.9, adult ([facility])  Assessment & Plan  Nutrition and exercise counseling.     Essential hypertension  Assessment & Plan  Chronic     BP elevated in ED. Home medication furosemide 20 mg daily PRN, losartan 100 mg daily, metoprolol 100 mg twice daily.     Continue metoprolol 100 mg daily  Hold furosemide 20 mg daily and losartan 100 mg daily  Continue to monitor BP         Fluids: none  Electrolyte repletion: Replete  as needed.  Nutrition:        Diet Orders   (From admission, onward)                 Start     Ordered    08/22/24 1810  Diet Cardiovascular; Cardiac  Diet effective now        References:    Adult Nutrition Support Algorithm    RD Therapeutic Diet Order Protocol   Question Answer Comment   Diet Type Cardiovascular    Cardiac Cardiac    RD to adjust diet per protocol? Yes        08/22/24 1809                   VTE Prophylaxis: VTE Score: 5 High Risk (Score >/= 5) - Pharmacological DVT Prophylaxis Ordered: enoxaparin (Lovenox). Sequential Compression Devices Ordered.  Code Status: Level 1 - Full Code  Anticipated Length of Stay:  Patient will be admitted on an Observation basis with an anticipated length of stay of  < than 2 midnights.     Justification for Hospital Stay: Stroke rule-out  Total Time for Visit, including Counseling / Coordination of Care: <30 mins. Greater than 50% of this total time spent on direct patient counseling and coordination of care.  Patient Information Sharing: With the consent of Karin Nieves, their loved ones (Kary Nieves) were notified today by inpatient team of the patient’s condition and current plan. All questions answered.    Chief Complaint:     Chief Complaint   Patient presents with    Fall     Patient lost footing in parking lot while shopping, falling on face and left wrist. Denies  "LOC, thinners. C/o wrist pain, headache, face.       Subjective      History of Present Illness:     Karin Nieves is a 74 y.o. female w/ PMH hypertension, anxiety, depression who presents with fall after going to food pantry at [employer]. Patient states that she was walking with her food items, lost balance and fell forward in the [employer] parking lot. States that she has been having some issues with her vision and some blurriness for the past two weeks. She states that things get clear and then blurry with a \"hump\" which is annoying for her. Patient also reports headache after fall. She usually takes Tylenol for headaches which helps her. Denies chest pain, SOB, new smells, dizziness, weakness, light-headedness or any other complaints at this time.     In ED, patient was given TDAP and tylenol. CY head, facial bones and wrist unremarkable.      Review of Systems:  Review of Systems   HENT:  Positive for facial swelling. Negative for ear pain, hearing loss, tinnitus and trouble swallowing.    Eyes:  Positive for visual disturbance.   Respiratory:  Negative for shortness of breath.    Cardiovascular:  Negative for chest pain.   Gastrointestinal:  Negative for abdominal pain, diarrhea and nausea.   Genitourinary:  Negative for difficulty urinating.   Musculoskeletal:  Negative for gait problem.   Neurological:  Negative for dizziness, tremors, seizures, syncope, facial asymmetry, speech difficulty, weakness, light-headedness, numbness and headaches.        Past Medical History:   Diagnosis Date    Anemia     Anxiety disorder     Arthritis     Blood disorder     Bronchitis     Chronic pain     Depression     Dizziness     GERD (gastroesophageal reflux disease)     History of transfusion     Hypertension     Kidney stone     Kidney stones     Lightheadedness     Obesity     SOB (shortness of breath)     Stroke (HCC)      Past Surgical History:   Procedure Laterality Date    ABDOMINAL SURGERY      APPENDECTOMY      " CHOLECYSTECTOMY      EGD AND COLONOSCOPY N/A 07/11/2016    Procedure: EGD AND COLONOSCOPY;  Surgeon: Yousif Lord MD;  Location: [facility];  Service:     EPIDURAL BLOCK INJECTION Right 07/13/2017    Procedure: BLOCK / INJECTION EPIDURAL STEROID TRANSFORAMINAL  L4-5;  Surgeon: Surendra Siu MD;  Location: [facility];  Service: Pain Management     EPIDURAL BLOCK INJECTION Right 08/09/2017    Procedure: BLOCK / INJECTION EPIDURAL STEROID TRANSFORAMINAL L4-5- REPEAT;  Surgeon: Surendra Siu MD;  Location: [facility];  Service: Pain Management     EPIDURAL BLOCK INJECTION Left 02/16/2018    Procedure: LEFT L4 AND L5 TRANSFORAMINAL EPIDURAL STEROID INJECTION;  Surgeon: Surendra Siu MD;  Location: [facility];  Service: Pain Management     EYE SURGERY      GASTRIC BYPASS      MAMMO (HISTORICAL) Bilateral 05/06/2021    [place] CYSTO/URETERO W/LITHOTRIPSY &INDWELL STENT INSRT Left 07/13/2016    Procedure: CYSTOSCOPY URETEROSCOPY WITH LITHOTRIPSY HOLMIUM LASER, RETROGRADE PYELOGRAM AND INSERTION STENT URETERAL;  Surgeon: Etienne Freeman MD;  Location: [facility];  Service: Urology    TONSILLECTOMY       Allergies   Allergen Reactions    Vicodin [Hydrocodone-Acetaminophen] GI Intolerance    Amlodipine Swelling     Leg swelling     Prior to Admission Medications   Prescriptions Last Dose Informant Patient Reported? Taking?   Calcium Carb-Cholecalciferol (calcium carbonate-vitamin D) 500 mg-5 mcg tablet   No No   Sig: Take 1 tablet by mouth once daily with breakfast   DULoxetine (Cymbalta) 30 mg delayed release capsule   No No   Sig: Take 1 capsule (30 mg total) by mouth daily   albuterol (Proventil HFA) 90 mcg/act inhaler Past Month  No Yes   Sig: Inhale 2 puffs every 6 (six) hours as needed for wheezing   calcium carbonate-vitamin D 500 mg-5 mcg per tablet 8/21/2024  No Yes   Sig: Take 1 tablet by mouth daily with breakfast   citalopram (CeleXA) 40 mg tablet 8/21/2024  No Yes   Sig: Take 1 tablet (40 mg  total) by mouth daily   furosemide (LASIX) 20 mg tablet 8/21/2024  No Yes   Sig: Take 1 tablet (20 mg total) by mouth daily As needed   lidocaine (Lidoderm) 5 %   No No   Sig: Apply 1 patch topically over 12 hours daily Remove & Discard patch within 12 hours or as directed by MD   losartan (COZAAR) 100 MG tablet 8/21/2024  No Yes   Sig: Take 1 tablet (100 mg total) by mouth daily   metoprolol tartrate (LOPRESSOR) 100 mg tablet 8/21/2024  No Yes   Sig: Take 1 tablet (100 mg total) by mouth 2 (two) times a day   naproxen (Naprosyn) 500 mg tablet Not Taking  No No   Sig: Take 1 tablet (500 mg total) by mouth 2 (two) times a day with meals   Patient not taking: Reported on 8/22/2024   pantoprazole (PROTONIX) 40 mg tablet 8/21/2024  No Yes   Sig: Take 1 tablet (40 mg total) by mouth daily      Facility-Administered Medications: None     Social History     Socioeconomic History    Marital status:      Spouse name: Not on file    Number of children: Not on file    Years of education: Not on file    Highest education level: Not on file   Occupational History    Not on file   Tobacco Use    Smoking status: Never    Smokeless tobacco: Never   Vaping Use    Vaping status: Never Used   Substance and Sexual Activity    Alcohol use: Never    Drug use: No    Sexual activity: Yes     Partners: Male     Birth control/protection: Post-menopausal, Inserts   Other Topics Concern    Not on file   Social History Narrative    Not on file     Social Determinants of Health     Financial Resource Strain: Low Risk  (8/6/2024)    Overall Financial Resource Strain (CARDIA)     Difficulty of Paying Living Expenses: Not very hard   Food Insecurity: No Food Insecurity (8/6/2024)    Hunger Vital Sign     Worried About Running Out of Food in the Last Year: Never true     Ran Out of Food in the Last Year: Never true   Transportation Needs: No Transportation Needs (8/6/2024)    PRAPARE - Transportation     Lack of Transportation (Medical): No      Lack of Transportation (Non-Medical): No   Physical Activity: Not on file   Stress: Not on file   Social Connections: Not on file   Intimate Partner Violence: Not on file   Housing Stability: Low Risk  (8/6/2024)    Housing Stability Vital Sign     Unable to Pay for Housing in the Last Year: No     Number of Times Moved in the Last Year: 0     Homeless in the Last Year: No     Family History   Problem Relation Age of Onset    No Known Problems Maternal Aunt     No Known Problems Maternal Aunt     No Known Problems Paternal Aunt     No Known Problems Paternal Aunt     No Known Problems Paternal Aunt     No Known Problems Daughter     No Known Problems Daughter     Diabetes Family     Heart disease Family         coronary arteriosclerosis        Patient Pre-hospital Living Situation: home  Patient Pre-hospital Level of Mobility: n/a  Patient Pre-hospital Diet Restrictions: n/a          Objective     Physical Exam:   Vitals:   Blood Pressure: (!) 181/83 (08/22/24 1031)  Pulse: 57 (08/22/24 1031)  Temperature: 97.7 °F (36.5 °C) (08/22/24 1031)  Temp Source: Tympanic (08/22/24 1031)  Respirations: 18 (08/22/24 1031)  SpO2: 96 % (08/22/24 1031)     Physical Exam  Vitals reviewed.   Constitutional:       Appearance: Normal appearance.   HENT:      Head: Normocephalic and atraumatic.      Right Ear: External ear normal.      Left Ear: External ear normal.      Nose:      Comments: 1cm stellate laceration inside upper lip; No active bleeding; Not through and through     Mouth/Throat:      Mouth: Mucous membranes are moist.      Pharynx: Oropharynx is clear.   Eyes:      General:         Right eye: No discharge.         Left eye: No discharge.      Extraocular Movements: Extraocular movements intact.      Conjunctiva/sclera: Conjunctivae normal.      Pupils: Pupils are equal, round, and reactive to light.   Cardiovascular:      Rate and Rhythm: Normal rate and regular rhythm.      Heart sounds: Normal heart sounds.  "  Pulmonary:      Effort: Pulmonary effort is normal. No respiratory distress.      Breath sounds: Normal breath sounds.   Abdominal:      General: Bowel sounds are normal. There is no distension.      Palpations: Abdomen is soft.      Tenderness: There is no abdominal tenderness.   Musculoskeletal:         General: Normal range of motion.      Cervical back: Neck supple.   Skin:     General: Skin is warm and dry.      Capillary Refill: Capillary refill takes less than 2 seconds.   Neurological:      General: No focal deficit present.      Mental Status: She is alert and oriented to person, place, and time. Mental status is at baseline.      Cranial Nerves: No cranial nerve deficit.      Sensory: No sensory deficit.      Motor: No weakness.      Coordination: Coordination normal.   Psychiatric:         Mood and Affect: Mood normal.         Behavior: Behavior normal.         Thought Content: Thought content normal.         Judgment: Judgment normal.            Lab Results: I have personally reviewed pertinent reports.    Results from last 7 days   Lab Units 08/22/24  1514   WBC Thousand/uL 8.28   HEMOGLOBIN g/dL 13.3   HEMATOCRIT % 40.7   PLATELETS Thousands/uL 182   SEGS PCT % 76*   LYMPHO PCT % 14   MONO PCT % 7   EOS PCT % 2     Results from last 7 days   Lab Units 08/22/24  1514   POTASSIUM mmol/L 4.5   CHLORIDE mmol/L 107   CO2 mmol/L 26   BUN mg/dL 15   CREATININE mg/dL 0.60   CALCIUM mg/dL 8.8   ALK PHOS U/L 107*   ALT U/L 24   AST U/L 31   EGFR ml/min/1.73sq m 91                            Invalid input(s): \"URIBILINOGEN\"                    Imaging: I have personally reviewed pertinent reports.    XR wrist 3+ views LEFT    Result Date: 8/22/2024  No acute osseous abnormality. Computerized Assisted Algorithm (CAA) may have been used to analyze all applicable images. Workstation performed: [id number]     CT facial bones without contrast    Result Date: 8/22/2024  Normal noncontrast CT of the facial bones. " Workstation performed: [id number]     CT spine cervical without contrast    Result Date: 8/22/2024  No cervical spine fracture or traumatic malalignment. Workstation performed: [id number]     CT head wo contrast    Result Date: 8/22/2024  No acute intracranial abnormality. Workstation performed: [id number]         EKG, Pathology, and Other Studies Reviewed on Admission:   EKG  Result Date: 08/22/24  Impression:  sinus bradycardia           Entire H&P was discussed with Dr. Sen who agreed to what is noted above     ** Please Note: This note has been constructed using a voice recognition system **     Luci Sloan MD  08/22/24  6:19 PM

## 2024-08-22 NOTE — ASSESSMENT & PLAN NOTE
Chronic.     Home medication protonix 40 mg daily.    Discharged with home Protonix 40 mg daily and Calcium Carbonate-vitamin D daily

## 2024-08-22 NOTE — PLAN OF CARE
Problem: PHYSICAL THERAPY ADULT  Goal: Performs mobility at highest level of function for planned discharge setting.  See evaluation for individualized goals.  Description: Treatment/Interventions: LE strengthening/ROM, Functional transfer training, ADL retraining, Bed mobility, Equipment eval/education, Patient/family training, Therapeutic exercise, Gait training, Spoke to MD  Equipment Recommended:  (Recommend pt use her rollator walker upon DC home)       See flowsheet documentation for full assessment, interventions and recommendations.  Note: Prognosis: Good  Problem List: Decreased strength, Impaired balance, Decreased mobility, Decreased endurance, Pain, Obesity  Assessment: Pt is 74 y.o. female seen for PT evaluation s/p admit to [facility] ED on 8/22/2024 s/p fall. PT consulted to assess pt's functional mobility and d/c needs. Order placed for PT eval and tx.  Co-morbidities affecting patient's physical performance include: CVA, obesity, spinal stenosis, chronic pain, obesity, depression, urge incontinence.  Personal factors affecting patient at time of initial evaluation include: ambulating with assistive device, inability to perform dynamic tasks in community, limited home support, positive fall history, and inability to live alone. Prior to admission, patient was independently ambulatory with a cane and  independent with ADLS.  Upon evaluation: Pt required a walker to ambulate 100 feet;  after walking pt needed to lie down due to dizziness.   Please find objective findings from Physical Therapy assessment regarding body systems outlined above with impairments and limitations including weakness, impaired balance, decreased endurance, gait deviations, pain, decreased activity tolerance, and decreased functional mobility tolerance.The Barthel Index was used as a functional outcome tool presenting with a score of Barthel Index Score: 70 today indicating moderate limitations of functional mobility  and ADLS.  Patient's clinical presentation is currently unstable/unpredictable as seen in patient's presentation of changing level of pain, increased fall risk, new onset of impairment of functional mobility, decreased endurance, and new onset of weakness. Pt would benefit from continued Physical Therapy treatment to address deficits as defined above and maximize level of functional mobility.     As demonstrated by objective findings, the assigned level of complexity for this evaluation is high.The patient's -[facility] Basic Mobility Inpatient Short Form Raw Score is 17. A Raw score of greater than 16 suggests the patient may benefit from discharge to home, however recommend pt have someone to accompany her home as she lives alone and fell today and still feels dizzy. Please also refer to the recommendation of the Physical Therapist for safe discharge planning.        Rehab Resource Intensity Level, PT: (S) III (Minimum Resource Intensity) (Pt may need additional suppport at home due to generalized weakness and recent fall as pt lives alone)    See flowsheet documentation for full assessment.

## 2024-08-22 NOTE — PHYSICAL THERAPY NOTE
"       PHYSICAL THERAPY EVALUATION       08/22/24 1330   PT Last Visit   PT Visit Date 08/22/24   Note Type   Note type Evaluation   Pain Assessment   Pain Assessment Tool 0-10   Pain Score 5   Pain Location/Orientation Location: Head  (L wrist) Per Dr. Clifton, x-ray negative for fx   Restrictions/Precautions   Other Precautions Pain;Fall Risk   Home Living   Type of Home House   Home Layout 2 level;    Pt stays on the first floor.  (ramped entrance)   Bathroom Toilet Standard   Bathroom Accessibility Accessible;  pt does not have a shower on the first level, only a 1/2 bath.   Home Equipment Cane  (rollator)   Prior Function   Level of [place] Independent with ADLs;Independent with functional mobility   Lives With Alone   Receives Help From Family   Falls in the last 6 months 1 to 4   Vocational Retired   General   Additional Pertinent History Pt fell loading groceries into her car.  Pt reports 2 other falls in the past 6 months.  Pt also has a history of CVA affecting her L side.  Pt reports double vision over the past few weeks and dizziness today.   Cognition   Overall Cognitive Status WFL   Arousal/Participation Alert   Orientation Level Oriented to place;Oriented to person;Oriented to time;Oriented to situation   Following Commands Follows multistep commands with increased time or repetition   Subjective   Subjective \"I feel dizzy\"   RLE Assessment   RLE Assessment   (ROM WFL, MMT 4/5)   LLE Assessment   LLE Assessment   (ROM WFL, MMT 4/5 x DF 3/5)   Vestibular   Spontaneous Nystagmus (-) no evidence of nystagmus at rest in room light   Gaze Holding Nystagmus (-) no evidence of nystagmus   Transfers   Sit to Stand 5  Supervision   Stand to Sit 5  Supervision   Ambulation/Elevation   Gait pattern   (slow tomasz,)   Gait Assistance   (min assist progressing to supervision)   Assistive Device Rolling walker   Distance 100 feet;  Pt needed to lie down after walking due to dizziness and fatigue.   Balance "   Static Sitting Fair +   Static Standing Fair   Ambulatory Fair -   Activity Tolerance   Activity Tolerance Patient limited by fatigue   Medical Staff Made Aware Dr. Clifton aware of pt's c/o dizziness, intermittent double vision, and ability to walk with a walker with supervision assist   Assessment   Prognosis Good   Problem List Decreased strength;Impaired balance;Decreased mobility;Decreased endurance;Pain;Obesity   Assessment Pt is 74 y.o. female seen for PT evaluation s/p admit to [facility] ED on 8/22/2024 s/p fall. PT consulted to assess pt's functional mobility and d/c needs. Order placed for PT eval and tx.  Co-morbidities affecting patient's physical performance include: CVA, obesity, spinal stenosis, chronic pain, obesity, depression, urge incontinence.  Personal factors affecting patient at time of initial evaluation include: ambulating with assistive device, inability to perform dynamic tasks in community, limited home support, positive fall history, and inability to live alone. Prior to admission, patient was independently ambulatory with a cane and  independent with ADLS.  Upon evaluation: Pt required a walker to ambulate 100 feet;  after walking pt needed to lie down due to dizziness. Of note, pt usually ambulates with a cane.   Please find objective findings from Physical Therapy assessment regarding body systems outlined above with impairments and limitations including weakness, impaired balance, decreased endurance, gait deviations, pain, decreased activity tolerance, and decreased functional mobility tolerance.The Barthel Index was used as a functional outcome tool presenting with a score of Barthel Index Score: 70 today indicating moderate limitations of functional mobility and ADLS.  Patient's clinical presentation is currently unstable/unpredictable as seen in patient's presentation of changing level of pain, increased fall risk, new onset of impairment of functional mobility,  "decreased endurance, and new onset of weakness. Pt would benefit from continued Physical Therapy treatment to address deficits as defined above and maximize level of functional mobility.     As demonstrated by objective findings, the assigned level of complexity for this evaluation is high.The patient's [facility] Basic Mobility Inpatient Short Form Raw Score is 17. A Raw score of greater than 16 suggests the patient may benefit from discharge to home, however recommend pt have someone to accompany her home as she lives alone and fell today and still feels dizzy. Please also refer to the recommendation of the Physical Therapist for safe discharge planning.   Goals   Patient Goals \"be able to go home and be independent\"   STG Expiration Date 08/29/24   Short Term Goal #1 1. independent ambulation with least restrictive device indoor level surfaces 100 feet   LTG Expiration Date 09/05/24   Long Term Goal #1 1. no falls, 2.independent ambulation with least restrictive device 250 feet with a steady gait outdoor surfaces, 3. Improve standing tolerance to at least 15 minutes so pt can make a simple meal at the kitchen counter   Plan   Treatment/Interventions LE strengthening/ROM;Functional transfer training;ADL retraining;Bed mobility;Equipment eval/education;Patient/family training;Therapeutic exercise;Gait training;Spoke to MD   PT Frequency Other (Comment)  (5x/week)   Discharge Recommendation   Rehab Resource Intensity Level, PT (S)  III (Minimum Resource Intensity)  (Pt may need additional suppport at home due to generalized weakness/current dizziness, and recent fall as pt lives alone)   Equipment Recommended   (Recommend pt use her rollator walker upon DC home)   [facility] Basic Mobility Inpatient   Turning in Flat Bed Without Bedrails 3   Lying on Back to Sitting on Edge of Flat Bed Without Bedrails 3   Moving Bed to Chair 3   Standing Up From Chair Using Arms 3   Walk in Room 3   Climb 3-5 Stairs With Railing 2   Basic " Mobility Inpatient Raw Score 17   Basic Mobility Standardized Score 39.67   [facility] Highest Level Of Mobility   -[facility] Goal 5: Stand one or more mins   -[facility] Achieved 7: Walk 25 feet or more   Barthel Index   Feeding 10   Bathing 0   Grooming Score 5   Dressing Score 5   Bladder Score 10   Bowels Score 10   Toilet Use Score 5   Transfers (Bed/Chair) Score 15   Mobility (Level Surface) Score 10   Stairs Score 0   Barthel Index Score 70   End of Consult   Patient Position at End of Consult All needs within reach;Supine   Licensure   [place] License Number  Shannon Santos PT [id number]

## 2024-08-22 NOTE — PLAN OF CARE
Problem: OCCUPATIONAL THERAPY ADULT  Goal: Performs self-care activities at highest level of function for planned discharge setting.  See evaluation for individualized goals.  Description: Treatment Interventions: ADL retraining, Functional transfer training, UE strengthening/ROM, Endurance training, Patient/family training, Equipment evaluation/education, Compensatory technique education, Continued evaluation, Energy conservation, Activityengagement          See flowsheet documentation for full assessment, interventions and recommendations.   Note: Limitation: Decreased ADL status, Decreased endurance, Decreased self-care trans, Decreased high-level ADLs, Visual deficit (impaired pain, activity tolerance, standing tolerance, balance)     Assessment: Pt is a 73yo female presented to ED at [facility] on 8/22/24 via DME following a fall. X-ray / imaging negative for traumatic injury / fracture. Pt reports living alone w/ first floor set- up and ramped entrance. Pt reports using cane for functional mobility and sponge bathes. Significant PMH Impacting her occupational performance includes chronic pain, anemia, anxiety/depression, hx CVA, CHF, spinal stenosis. Upon eval, pt alert and generally oriented on room air. Pt reports R hand dominance and mild L UE/LE residual deficits s/p CVA. Pt required min A to complete bed mobility supine to sit at edge of stretcher to her R. Pt required S sit <> stand, min A --> S using RW functional mobility, S grooming, mod I UBD, S LBD. Pt demonstrated decreased activity tolerance, standing tolerance, balance and reported pain, dizziness, double vision. Pt is completing ADLs below baseline level of I. Recommend level III rehab resource intensity when medically stable for discharge using RW. Will continue to follow     Rehab Resource Intensity Level, OT: III (Minimum Resource Intensity)

## 2024-08-23 ENCOUNTER — APPOINTMENT (OUTPATIENT)
Dept: RADIOLOGY | Facility: HOSPITAL | Age: 74
DRG: 092 | End: 2024-08-23
Payer: COMMERCIAL

## 2024-08-23 ENCOUNTER — APPOINTMENT (OUTPATIENT)
Dept: NON INVASIVE DIAGNOSTICS | Facility: HOSPITAL | Age: 74
DRG: 092 | End: 2024-08-23
Payer: COMMERCIAL

## 2024-08-23 LAB
ANION GAP SERPL CALCULATED.3IONS-SCNC: 5 MMOL/L (ref 4–13)
AORTIC ROOT: 3.6 CM
AORTIC VALVE MEAN VELOCITY: 19.1 M/S
APICAL FOUR CHAMBER EJECTION FRACTION: 67 %
ATRIAL RATE: 67 BPM
AV AREA BY CONTINUOUS VTI: 2 CM2
AV AREA PEAK VELOCITY: 1.8 CM2
AV LVOT MEAN GRADIENT: 5 MMHG
AV LVOT PEAK GRADIENT: 8 MMHG
AV MEAN GRADIENT: 16 MMHG
AV PEAK GRADIENT: 29 MMHG
AV REGURGITATION PRESSURE HALF TIME: 551 MS
AV VALVE AREA: 2.01 CM2
AV VELOCITY RATIO: 0.51
BASOPHILS # BLD AUTO: 0.07 THOUSANDS/ÂΜL (ref 0–0.1)
BASOPHILS NFR BLD AUTO: 1 % (ref 0–1)
BNP SERPL-MCNC: 232 PG/ML (ref 0–100)
BSA FOR ECHO PROCEDURE: 2.24 M2
BUN SERPL-MCNC: 13 MG/DL (ref 5–25)
CALCIUM SERPL-MCNC: 8.9 MG/DL (ref 8.4–10.2)
CARDIAC TROPONIN I PNL SERPL HS: 8 NG/L (ref 8–18)
CHLORIDE SERPL-SCNC: 104 MMOL/L (ref 96–108)
CHOLEST SERPL-MCNC: 172 MG/DL
CO2 SERPL-SCNC: 29 MMOL/L (ref 21–32)
CREAT SERPL-MCNC: 0.62 MG/DL (ref 0.6–1.3)
DOP CALC AO PEAK VEL: 2.69 M/S
DOP CALC AO VTI: 53.15 CM
DOP CALC LVOT AREA: 3.46 CM2
DOP CALC LVOT CARDIAC INDEX: 3.35 L/MIN/M2
DOP CALC LVOT CARDIAC OUTPUT: 7.54 L/MIN
DOP CALC LVOT DIAMETER: 2.1 CM
DOP CALC LVOT PEAK VEL VTI: 30.93 CM
DOP CALC LVOT PEAK VEL: 1.38 M/S
DOP CALC LVOT STROKE INDEX: 49.3 ML/M2
DOP CALC LVOT STROKE VOLUME: 107.08
E WAVE DECELERATION TIME: 317 MS
E/A RATIO: 0.65
EOSINOPHIL # BLD AUTO: 0.25 THOUSAND/ÂΜL (ref 0–0.61)
EOSINOPHIL NFR BLD AUTO: 3 % (ref 0–6)
ERYTHROCYTE [DISTWIDTH] IN BLOOD BY AUTOMATED COUNT: 12.7 % (ref 11.6–15.1)
FRACTIONAL SHORTENING: 30 (ref 28–44)
GFR SERPL CREATININE-BSD FRML MDRD: 90 ML/MIN/1.73SQ M
GLUCOSE P FAST SERPL-MCNC: 121 MG/DL (ref 65–99)
GLUCOSE SERPL-MCNC: 121 MG/DL (ref 65–140)
HCT VFR BLD AUTO: 39.8 % (ref 34.8–46.1)
HDLC SERPL-MCNC: 41 MG/DL
HGB BLD-MCNC: 13.2 G/DL (ref 11.5–15.4)
IMM GRANULOCYTES # BLD AUTO: 0.01 THOUSAND/UL (ref 0–0.2)
IMM GRANULOCYTES NFR BLD AUTO: 0 % (ref 0–2)
INTERVENTRICULAR SEPTUM IN DIASTOLE (PARASTERNAL SHORT AXIS VIEW): 1.6 CM
INTERVENTRICULAR SEPTUM: 1.6 CM (ref 0.6–1.1)
LAAS-AP2: 36.3 CM2
LAAS-AP4: 29.7 CM2
LDLC SERPL CALC-MCNC: 112 MG/DL (ref 0–100)
LEFT ATRIUM SIZE: 4.1 CM
LEFT ATRIUM VOLUME (MOD BIPLANE): 130 ML
LEFT ATRIUM VOLUME INDEX (MOD BIPLANE): 57.8 ML/M2
LEFT INTERNAL DIMENSION IN SYSTOLE: 3.2 CM (ref 2.1–4)
LEFT VENTRICULAR INTERNAL DIMENSION IN DIASTOLE: 4.6 CM (ref 3.5–6)
LEFT VENTRICULAR POSTERIOR WALL IN END DIASTOLE: 1 CM
LEFT VENTRICULAR STROKE VOLUME: 58 ML
LVSV (TEICH): 58 ML
LYMPHOCYTES # BLD AUTO: 0.86 THOUSANDS/ÂΜL (ref 0.6–4.47)
LYMPHOCYTES NFR BLD AUTO: 11 % (ref 14–44)
MCH RBC QN AUTO: 30.8 PG (ref 26.8–34.3)
MCHC RBC AUTO-ENTMCNC: 33.2 G/DL (ref 31.4–37.4)
MCV RBC AUTO: 93 FL (ref 82–98)
MONOCYTES # BLD AUTO: 0.65 THOUSAND/ÂΜL (ref 0.17–1.22)
MONOCYTES NFR BLD AUTO: 8 % (ref 4–12)
MV E'TISSUE VEL-SEP: 5 CM/S
MV PEAK A VEL: 1.06 M/S
MV PEAK E VEL: 69 CM/S
MV STENOSIS PRESSURE HALF TIME: 92 MS
MV VALVE AREA P 1/2 METHOD: 2.39
NEUTROPHILS # BLD AUTO: 5.93 THOUSANDS/ÂΜL (ref 1.85–7.62)
NEUTS SEG NFR BLD AUTO: 77 % (ref 43–75)
NRBC BLD AUTO-RTO: 0 /100 WBCS
P AXIS: 63 DEGREES
PLATELET # BLD AUTO: 165 THOUSANDS/UL (ref 149–390)
PMV BLD AUTO: 9.2 FL (ref 8.9–12.7)
POTASSIUM SERPL-SCNC: 3.8 MMOL/L (ref 3.5–5.3)
PR INTERVAL: 160 MS
QRS AXIS: 50 DEGREES
QRSD INTERVAL: 96 MS
QT INTERVAL: 432 MS
QTC INTERVAL: 456 MS
RBC # BLD AUTO: 4.29 MILLION/UL (ref 3.81–5.12)
RIGHT ATRIUM AREA SYSTOLE A4C: 21.7 CM2
RIGHT VENTRICLE ID DIMENSION: 4.7 CM
SL CV AV DECELERATION TIME RETROGRADE: 1900 MS
SL CV AV PEAK GRADIENT RETROGRADE: 96 MMHG
SL CV LEFT ATRIUM LENGTH A2C: 7.1 CM
SL CV LV EF: 58
SL CV PED ECHO LEFT VENTRICLE DIASTOLIC VOLUME (MOD BIPLANE) 2D: 100 ML
SL CV PED ECHO LEFT VENTRICLE SYSTOLIC VOLUME (MOD BIPLANE) 2D: 41 ML
SODIUM SERPL-SCNC: 138 MMOL/L (ref 135–147)
T WAVE AXIS: 79 DEGREES
TR MAX PG: 24 MMHG
TR PEAK VELOCITY: 2.5 M/S
TRICUSPID ANNULAR PLANE SYSTOLIC EXCURSION: 2.4 CM
TRICUSPID VALVE PEAK REGURGITATION VELOCITY: 2.46 M/S
TRIGL SERPL-MCNC: 96 MG/DL
VENTRICULAR RATE: 67 BPM
WBC # BLD AUTO: 7.77 THOUSAND/UL (ref 4.31–10.16)

## 2024-08-23 PROCEDURE — 83880 ASSAY OF NATRIURETIC PEPTIDE: CPT

## 2024-08-23 PROCEDURE — 84484 ASSAY OF TROPONIN QUANT: CPT

## 2024-08-23 PROCEDURE — 71045 X-RAY EXAM CHEST 1 VIEW: CPT

## 2024-08-23 PROCEDURE — 70551 MRI BRAIN STEM W/O DYE: CPT

## 2024-08-23 PROCEDURE — 93306 TTE W/DOPPLER COMPLETE: CPT | Performed by: INTERNAL MEDICINE

## 2024-08-23 PROCEDURE — 99232 SBSQ HOSP IP/OBS MODERATE 35: CPT | Performed by: INTERNAL MEDICINE

## 2024-08-23 PROCEDURE — 93306 TTE W/DOPPLER COMPLETE: CPT

## 2024-08-23 PROCEDURE — 93005 ELECTROCARDIOGRAM TRACING: CPT

## 2024-08-23 PROCEDURE — 80048 BASIC METABOLIC PNL TOTAL CA: CPT

## 2024-08-23 PROCEDURE — 85025 COMPLETE CBC W/AUTO DIFF WBC: CPT

## 2024-08-23 PROCEDURE — 99205 OFFICE O/P NEW HI 60 MIN: CPT | Performed by: PSYCHIATRY & NEUROLOGY

## 2024-08-23 PROCEDURE — 93010 ELECTROCARDIOGRAM REPORT: CPT | Performed by: INTERNAL MEDICINE

## 2024-08-23 PROCEDURE — 80061 LIPID PANEL: CPT

## 2024-08-23 RX ORDER — HYDRALAZINE HYDROCHLORIDE 20 MG/ML
5 INJECTION INTRAMUSCULAR; INTRAVENOUS EVERY 6 HOURS PRN
Status: DISCONTINUED | OUTPATIENT
Start: 2024-08-23 | End: 2024-08-23

## 2024-08-23 RX ORDER — HYDRALAZINE HYDROCHLORIDE 20 MG/ML
10 INJECTION INTRAMUSCULAR; INTRAVENOUS EVERY 4 HOURS PRN
Status: DISCONTINUED | OUTPATIENT
Start: 2024-08-23 | End: 2024-08-24 | Stop reason: HOSPADM

## 2024-08-23 RX ORDER — HYDRALAZINE HYDROCHLORIDE 20 MG/ML
5 INJECTION INTRAMUSCULAR; INTRAVENOUS EVERY 4 HOURS PRN
Status: DISCONTINUED | OUTPATIENT
Start: 2024-08-23 | End: 2024-08-23

## 2024-08-23 RX ORDER — DULOXETIN HYDROCHLORIDE 30 MG/1
30 CAPSULE, DELAYED RELEASE ORAL DAILY
Status: DISCONTINUED | OUTPATIENT
Start: 2024-08-23 | End: 2024-08-24 | Stop reason: HOSPADM

## 2024-08-23 RX ORDER — METOPROLOL TARTRATE 100 MG
100 TABLET ORAL 2 TIMES DAILY
Status: DISCONTINUED | OUTPATIENT
Start: 2024-08-23 | End: 2024-08-24 | Stop reason: HOSPADM

## 2024-08-23 RX ORDER — HYDRALAZINE HYDROCHLORIDE 20 MG/ML
10 INJECTION INTRAMUSCULAR; INTRAVENOUS EVERY 4 HOURS PRN
Status: DISCONTINUED | OUTPATIENT
Start: 2024-08-23 | End: 2024-08-23

## 2024-08-23 RX ORDER — FUROSEMIDE 20 MG
20 TABLET ORAL DAILY
Status: DISCONTINUED | OUTPATIENT
Start: 2024-08-23 | End: 2024-08-23

## 2024-08-23 RX ORDER — NIFEDIPINE 30 MG/1
30 TABLET, EXTENDED RELEASE ORAL DAILY
Status: DISCONTINUED | OUTPATIENT
Start: 2024-08-23 | End: 2024-08-24 | Stop reason: HOSPADM

## 2024-08-23 RX ORDER — LOSARTAN POTASSIUM 50 MG/1
100 TABLET ORAL DAILY
Status: DISCONTINUED | OUTPATIENT
Start: 2024-08-23 | End: 2024-08-24 | Stop reason: HOSPADM

## 2024-08-23 RX ORDER — FUROSEMIDE 20 MG
20 TABLET ORAL DAILY
Status: DISCONTINUED | OUTPATIENT
Start: 2024-08-23 | End: 2024-08-24 | Stop reason: HOSPADM

## 2024-08-23 RX ORDER — POTASSIUM CHLORIDE 1500 MG/1
20 TABLET, EXTENDED RELEASE ORAL ONCE
Status: COMPLETED | OUTPATIENT
Start: 2024-08-23 | End: 2024-08-23

## 2024-08-23 RX ORDER — FUROSEMIDE 20 MG
20 TABLET ORAL DAILY
Status: CANCELLED | OUTPATIENT
Start: 2024-08-23

## 2024-08-23 RX ORDER — POLYETHYLENE GLYCOL 3350 17 G/17G
17 POWDER, FOR SOLUTION ORAL ONCE
Status: DISCONTINUED | OUTPATIENT
Start: 2024-08-23 | End: 2024-08-24 | Stop reason: HOSPADM

## 2024-08-23 RX ORDER — LORAZEPAM 2 MG/ML
0.5 INJECTION INTRAMUSCULAR ONCE
Status: COMPLETED | OUTPATIENT
Start: 2024-08-23 | End: 2024-08-23

## 2024-08-23 RX ADMIN — LORAZEPAM 0.5 MG: 2 INJECTION INTRAMUSCULAR; INTRAVENOUS at 10:19

## 2024-08-23 RX ADMIN — LOSARTAN POTASSIUM 100 MG: 50 TABLET, FILM COATED ORAL at 12:38

## 2024-08-23 RX ADMIN — PANTOPRAZOLE SODIUM 40 MG: 40 TABLET, DELAYED RELEASE ORAL at 08:05

## 2024-08-23 RX ADMIN — FUROSEMIDE 20 MG: 20 TABLET ORAL at 06:47

## 2024-08-23 RX ADMIN — Medication 1 TABLET: at 08:05

## 2024-08-23 RX ADMIN — HYDRALAZINE HYDROCHLORIDE 10 MG: 20 INJECTION INTRAMUSCULAR; INTRAVENOUS at 14:47

## 2024-08-23 RX ADMIN — POTASSIUM CHLORIDE 20 MEQ: 1500 TABLET, EXTENDED RELEASE ORAL at 08:05

## 2024-08-23 RX ADMIN — HYDRALAZINE HYDROCHLORIDE 5 MG: 20 INJECTION INTRAMUSCULAR; INTRAVENOUS at 02:19

## 2024-08-23 RX ADMIN — ATORVASTATIN CALCIUM 40 MG: 40 TABLET, FILM COATED ORAL at 17:13

## 2024-08-23 RX ADMIN — ENOXAPARIN SODIUM 40 MG: 40 INJECTION SUBCUTANEOUS at 17:13

## 2024-08-23 RX ADMIN — METOPROLOL TARTRATE 100 MG: 100 TABLET, FILM COATED ORAL at 17:13

## 2024-08-23 RX ADMIN — DULOXETINE HYDROCHLORIDE 30 MG: 30 CAPSULE, DELAYED RELEASE ORAL at 14:49

## 2024-08-23 RX ADMIN — METOPROLOL TARTRATE 100 MG: 100 TABLET, FILM COATED ORAL at 05:42

## 2024-08-23 RX ADMIN — ENOXAPARIN SODIUM 40 MG: 40 INJECTION SUBCUTANEOUS at 08:05

## 2024-08-23 RX ADMIN — HYDRALAZINE HYDROCHLORIDE 10 MG: 20 INJECTION INTRAMUSCULAR; INTRAVENOUS at 22:29

## 2024-08-23 RX ADMIN — CITALOPRAM HYDROBROMIDE 40 MG: 20 TABLET ORAL at 08:05

## 2024-08-23 RX ADMIN — NIFEDIPINE 30 MG: 30 TABLET, EXTENDED RELEASE ORAL at 19:21

## 2024-08-23 RX ADMIN — ASPIRIN 81 MG CHEWABLE TABLET 81 MG: 81 TABLET CHEWABLE at 08:05

## 2024-08-23 NOTE — QUICK NOTE
Notified by nursing of 0200 BP > 220 systolic - pt asymptomatic at that time - BP s/p hydralazine minimally improved to below threshold. Nursing noting patient had several episodes of bladder incontinence, which pt reports are stable and present at home.     At approximately 0530, patient began to report chest pressure, SOB. Noting episode began approximately 2 hours before but did not notify nursing. Upon evaluation of pt - /100. Denies HA, N/V, blurry vision.     EKG review unchanged since admission     Ordered:  -Troponin   -BNP   -CXR   -Given metoprolol 100 mg at 0530 rather than 0900

## 2024-08-23 NOTE — CONSULTS
[facility]   Neurology Initial Consult    Karin Nieves is a 74 y.o. female  ICU 12/ICU 12          Information obtained from:   Chief Complaint   Patient presents with    Fall     Patient lost footing in parking lot while shopping, falling on face and left wrist. Denies LOC, thinners. C/o wrist pain, headache, face.         Assessment/Plan:    S/p mechanical fall  Moderate small vessel disease   HTN urgency  HLD  Incidental right sphenoid wing meningioma       Patient was able to provide sufficient history. At no point it felt like some thing neurological occurred prior to her fall. It appeared to be mechanical. She does have poor balance as residual deficit from her prior strokes.  Discussed treatment of her blood pressure.  Lipitor 40mg upon discharge for LDL >70.  Recommend long term aspirin 81mg from secondary stroke prevention.  Recommend PT/OT outpatient.        Total time of encounter: 70 min  More than 50% of time was spent in counseling and coordination of care of patient.       HPI:    Karin Nieves is a 75 yo F that presents after a fall. She was out at grocery store and loading groceries in car. Says she lost her footing and fell. She denied blacking out. Denied tunnel vision, aura prior to the fall. There was no LOC or confusion afterwards. She has noticed blurred vision but that has been ongoing for a while. Her BP has been very much elevated since arrival to as high as 227/103.   There is no associated focal weakness or speech disturbance. She does report mild left sided weakness since her old stroke.   She denies being on any blood thinner since her previous strokes.     Past Medical History:   Diagnosis Date    Anemia     Anxiety disorder     Arthritis     Blood disorder     Bronchitis     Chronic pain     Depression     Dizziness     GERD (gastroesophageal reflux disease)     History of transfusion     Hypertension     Kidney stone     Kidney stones     Lightheadedness      Obesity     SOB (shortness of breath)     Stroke (HCC)        Past Surgical History:   Procedure Laterality Date    ABDOMINAL SURGERY      APPENDECTOMY      CHOLECYSTECTOMY      EGD AND COLONOSCOPY N/A 07/11/2016    Procedure: EGD AND COLONOSCOPY;  Surgeon: Yousif Lord MD;  Location: [facility] GI LAB;  Service:     EPIDURAL BLOCK INJECTION Right 07/13/2017    Procedure: BLOCK / INJECTION EPIDURAL STEROID TRANSFORAMINAL  L4-5;  Surgeon: Surendra Siu MD;  Location: [facility] MAIN OR;  Service: Pain Management     EPIDURAL BLOCK INJECTION Right 08/09/2017    Procedure: BLOCK / INJECTION EPIDURAL STEROID TRANSFORAMINAL L4-5- REPEAT;  Surgeon: Surendra Siu MD;  Location: [facility] MAIN OR;  Service: Pain Management     EPIDURAL BLOCK INJECTION Left 02/16/2018    Procedure: LEFT L4 AND L5 TRANSFORAMINAL EPIDURAL STEROID INJECTION;  Surgeon: Surendra Siu MD;  Location: [facility] MAIN OR;  Service: Pain Management     EYE SURGERY      GASTRIC BYPASS      MAMMO (HISTORICAL) Bilateral 05/06/2021    DC CYSTO/URETERO W/LITHOTRIPSY &INDWELL STENT INSRT Left 07/13/2016    Procedure: CYSTOSCOPY URETEROSCOPY WITH LITHOTRIPSY HOLMIUM LASER, RETROGRADE PYELOGRAM AND INSERTION STENT URETERAL;  Surgeon: Etienne Freeman MD;  Location: [facility] MAIN OR;  Service: Urology    TONSILLECTOMY         Allergies   Allergen Reactions    Vicodin [Hydrocodone-Acetaminophen] GI Intolerance    Amlodipine Swelling     Leg swelling         Current Facility-Administered Medications:     albuterol (PROVENTIL HFA,VENTOLIN HFA) inhaler 2 puff, 2 puff, Inhalation, Q6H PRN, Heriberto Trejo MD    aspirin chewable tablet 81 mg, 81 mg, Oral, Daily, Heriberto Trejo MD, 81 mg at 08/23/24 0805    atorvastatin (LIPITOR) tablet 40 mg, 40 mg, Oral, QPM, Heriberto Trejo MD, 40 mg at 08/22/24 1826    calcium carbonate-vitamin D 500 mg-5 mcg tablet 1 tablet, 1 tablet, Oral, Daily With Breakfast, Heriberto Trejo MD, 1 tablet at 08/23/24 0805    citalopram (CeleXA) tablet 40 mg, 40 mg, Oral,  Daily, Heriberto Trejo MD, 40 mg at 08/23/24 0805    DULoxetine (CYMBALTA) delayed release capsule 30 mg, 30 mg, Oral, Daily, Luci Sloan MD, 30 mg at 08/23/24 1449    enoxaparin (LOVENOX) subcutaneous injection 40 mg, 40 mg, Subcutaneous, BID, Heriberto Trejo MD, 40 mg at 08/23/24 0805    furosemide (LASIX) tablet 20 mg, 20 mg, Oral, Daily, Luci Sloan MD, 20 mg at 08/23/24 0647    hydrALAZINE (APRESOLINE) injection 10 mg, 10 mg, Intravenous, Q4H PRN, Luci Sloan MD, 10 mg at 08/23/24 1447    lidocaine (LIDODERM) 5 % patch 1 patch, 1 patch, Topical, HS PRN, Heriberto Trejo MD    losartan (COZAAR) tablet 100 mg, 100 mg, Oral, Daily, Yolande Sen MD, 100 mg at 08/23/24 1238    metoprolol tartrate (LOPRESSOR) tablet 100 mg, 100 mg, Oral, BID, Mary Bolaños DO, 100 mg at 08/23/24 0542    pantoprazole (PROTONIX) EC tablet 40 mg, 40 mg, Oral, Daily, Heriberto Trejo MD, 40 mg at 08/23/24 0805    Social History     Socioeconomic History    Marital status:      Spouse name: Not on file    Number of children: Not on file    Years of education: Not on file    Highest education level: Not on file   Occupational History    Not on file   Tobacco Use    Smoking status: Never    Smokeless tobacco: Never   Vaping Use    Vaping status: Never Used   Substance and Sexual Activity    Alcohol use: Never    Drug use: No    Sexual activity: Yes     Partners: Male     Birth control/protection: Post-menopausal, Inserts   Other Topics Concern    Not on file   Social History Narrative    Not on file     Social Determinants of Health     Financial Resource Strain: Low Risk  (8/6/2024)    Overall Financial Resource Strain (CARDIA)     Difficulty of Paying Living Expenses: Not very hard   Food Insecurity: No Food Insecurity (8/23/2024)    Hunger Vital Sign     Worried About Running Out of Food in the Last Year: Never true     Ran Out of Food in the Last Year: Never true   Transportation Needs: No Transportation Needs (8/23/2024)     PRAPARE - Transportation     Lack of Transportation (Medical): No     Lack of Transportation (Non-Medical): No   Physical Activity: Not on file   Stress: Not on file   Social Connections: Not on file   Intimate Partner Violence: Not on file   Housing Stability: Low Risk  (8/23/2024)    Housing Stability Vital Sign     Unable to Pay for Housing in the Last Year: No     Number of Times Moved in the Last Year: 0     Homeless in the Last Year: No       Family History   Problem Relation Age of Onset    No Known Problems Maternal Aunt     No Known Problems Maternal Aunt     No Known Problems Paternal Aunt     No Known Problems Paternal Aunt     No Known Problems Paternal Aunt     No Known Problems Daughter     No Known Problems Daughter     Diabetes Family     Heart disease Family         coronary arteriosclerosis         Review of systems:  Please see HPI for positive symptoms. No fever, no chills, no weight change. Ocular: No drainage, no blurred vision. HEENT:  No sore throat, earache, or congestion. No neck pain. COR:  No chest pain. No palpitations. Lungs:  no sob, wheezing,  GI:  no  nausea, no vomiting, no diarrhea, no constipation, no anorexia. :  No dysuria, frequency, or urgency. No hematuria.  Musculoskeletal:  No joint pain or swelling or edema. Skin:  No rash or itching. Psychiatric:  no anxiety, no depression. Endocrine:  No polyuria or polydipsia.    Physical examination:  Vitals:    08/23/24 1330   BP: (!) 186/86   Pulse: 62   Resp:    Temp:    SpO2:        GENERAL APPEARANCE:  The patient is alert, oriented.  facial ecchymosis+. He is in no acute distress.  HEENT:  Head is normocephalic.  The sinuses are otherwise nontender.  Pupils are equal and reactive.    NECK:  Supple without lymphadenopathy.   HEART:  Regular rate and rhythm.  LUNGS:  clear to auscultation. No crackles or wheezes are heard.  ABDOMEN:  Soft, nontender, nondistended with good bowel sounds heard.    EXTREMITIES:  Without cyanosis,  clubbing or edema.     Mental status:  The patient is alert, attentive, and oriented. Speech is clear and fluent, good repetition, comprehension, and naming. she recalls 3/3 objects at 5 minutes.  Cranial nerves:  CN II: Visual fields are full to confrontation. Fundoscopic exam is normal with sharp discs and no vascular changes.. Pupils are 3 mm and reactive to light.   CN III, IV, VI: At primary gaze, there is no eye deviation.   CN V: Facial sensation is intact to pinprick in all 3 divisions bilaterally. Corneal responses are intact.  CN VII: Face is symmetric with normal eye closure and smile.  CN VIII: Hearing is normal to rubbing fingers  CN IX, X: Palate elevates symmetrically. Phonation is normal.  CN XI: Head turning and shoulder shrug are intact  CN XII: Tongue is midline with normal movements and no atrophy.  Motor:  There is no pronator drift of out-stretched arms.  Muscle bulk and tone are normal.     Muscle exam  Arm Right Left Leg Right Left   Deltoid 5/5 5/5 Iliopsoas 5/5 5/5   Biceps 5/5 5/5 Quads 5/5 5/5   Triceps 5/5 5/5 Hamstrings 5/5 5/5   Wrist Extension 5/5 5/5 Ankle Dorsi Flexion 5/5 5/5   Wrist Flexion 5/5 5/5 Ankle Plantar Flexion 5/5 5/5   Interossei 5/5 5/5 Ankle Eversion 5/5 5/5   APB 5/5 5/5 Ankle Inversion 5/5 5/5       Reflexes   RJ BJ TJ KJ AJ Plantars Iraheta's   Right 2+ 2+ 2+ 2+ 2+ Downgoing Not present   Left 2+ 2+ 2+ 2+ 2+ Downgoing Not present     Sensory:  Light touch, pinprick, position sense, and vibration sense are intact in fingers and toes.  Coordination:  Rapid alternating movements and fine finger movements are intact. There is no dysmetria on finger-to-nose and heel-knee-shin. There are no abnormal or extraneous movements. Romberg negative.  Gait/Stance:  Deferred.     Lab Results   Component Value Date    WBC 7.77 08/23/2024    HGB 13.2 08/23/2024    HCT 39.8 08/23/2024    MCV 93 08/23/2024     08/23/2024     Lab Results   Component Value Date    HGBA1C 5.9 (H)  08/06/2024     Lab Results   Component Value Date    ALT 24 08/22/2024    AST 31 08/22/2024    ALKPHOS 107 (H) 08/22/2024     Lab Results   Component Value Date    CALCIUM 8.9 08/23/2024    K 3.8 08/23/2024    CO2 29 08/23/2024     08/23/2024    BUN 13 08/23/2024    CREATININE 0.62 08/23/2024         Radiology          Review of reports and notes reveal:    Independent Interpretation of images or specimens:  MRI brain wo contrast    Result Date: 8/23/2024  1. No acute infarct. Multiple old lacunar infarcts involving the bilateral basal ganglia, left thalamus, right paramedian midbrain, and bilateral cerebellar hemispheres. Old infarct is noted involving the periventricular corona radiata white matter of the right frontal lobe. 2. Extra-axial FLAIR hyperintense lesion, along the right sphenoid wing, corresponding to the enhancing lesion identified on CT angiogram of the head and neck. This is most indicative of a meningioma. No centimeter genic edema or significant mass effect noted. 3. Mild chronic microangiopathic changes involving the white matter of both cerebral hemispheres. Workstation performed: [id number]     XR chest portable    Result Date: 8/23/2024  No acute cardiopulmonary disease. Workstation performed: [id number]     CTA head and neck with and without contrast    Result Date: 8/22/2024  CT Brain: No CT evidence for a new large acute vascular distribution infarct. Incidentally noted enhancing lesion along the right sphenoid wing likely represents a meningioma in the absence of any known primary malignancy and only appreciated on postcontrast imaging. Possible additional meningioma noted along the anterior falx. Contrast-enhanced is recommended for further evaluation. CT Angiography: No definite evidence for high-grade stenosis, focal occlusion or vascular aneurysm of the cervical or intracranial vessels. Atherosclerotic irregularity with mild to moderate narrowing of the basilar artery. Component of  decreased caliber of the basilar artery is likely in part congenital in nature secondary to fetal origins of the posterior cerebral arteries. No focal occlusion. Hyperdensity along the anterolateral aspect of the right oral cavity may represent a piece of candy. Please correlate with direct visual inspection to exclude the possibility of an enhancing lesion in this region. Workstation performed: [id number]     XR wrist 3+ views LEFT    Result Date: 8/22/2024  No acute osseous abnormality. Computerized Assisted Algorithm (CAA) may have been used to analyze all applicable images. Workstation performed: [id number]     CT facial bones without contrast    Result Date: 8/22/2024  Normal noncontrast CT of the facial bones. Workstation performed: [id number]     CT spine cervical without contrast    Result Date: 8/22/2024  No cervical spine fracture or traumatic malalignment. Workstation performed: [id number]     CT head wo contrast    Result Date: 8/22/2024  No acute intracranial abnormality. Workstation performed: [id number]                Thank you for this consult.      Beth May M.D.  [facility]  [address] [place] [id number]

## 2024-08-23 NOTE — CASE MANAGEMENT
Case Management Assessment & Discharge Planning Note    Patient name Karin Nieves  Location ICU 12/ICU 12 MRN [id number]  : 1950 Date 2024       Current Admission Date: 2024  Current Admission Diagnosis:Stroke-like symptoms   Patient Active Problem List    Diagnosis Date Noted Date Diagnosed    Stroke-like symptoms 2024     Encounter for disability examination 2024     Gastroesophageal reflux disease 2023     Urge incontinence 2023     History of gastric ulcer 2023     History of anemia 2023     Pre-diabetes 2023     Recurrent major depressive disorder, in full remission ([facility]) 2022     Chronic diastolic CHF (congestive heart failure) ([facility]) 2022     Cerebrovascular accident (CVA) due to embolism of cerebral artery ([facility]) 2022     Essential hypertension 2021     History of gastric bypass 2021     Morbid obesity with BMI of 40.0-44.9, adult ([facility]) 2021     Chronic bilateral low back pain 2018     Chronic pain syndrome 2017     Low back pain 2017     Radiculopathy of lumbar region 2017     Spinal stenosis of lumbar region 2017     Other intervertebral disc degeneration, lumbar region 2017     Anxiety disorder 2017     H/O bariatric surgery - bypass 2017    Iron deficiency anemia 2017     Other specified anemias 2016     Gastro-esophageal reflux disease with esophagitis 2014     Cardiomegaly 2012     Transient ischemic attack 2012       LOS (days): 0  Geometric Mean LOS (GMLOS) (days):   Days to GMLOS:     OBJECTIVE:      Current admission status: Observation  Referral Reason: Stroke    Preferred Pharmacy:   [facility] - [place], [place] - [address]  [address]  [facility] [address]  Phone: [phone] Fax: [phone]    Primary Care Provider: Debby Dawn MD    Primary Insurance: [employer] [facility]  REP  Secondary Insurance: [facility] [employer] [place]    ASSESSMENT:  Active Health Care Proxies    There are no active Health Care Proxies on file.  Readmission Root Cause  30 Day Readmission: No    Patient Information  Admitted from:: Home  Mental Status: Alert  During Assessment patient was accompanied by: Daughter  Assessment information provided by:: Patient, Daughter  Primary Caregiver: Self  Support Systems: Daughter  [facility] of Residence: [place]  What city do you live in?: [place], [place]  Home entry access options. Select all that apply.: Ramp  Type of Current Residence: 2 story home  Upon entering residence, is there a bedroom on the main floor (no further steps)?: Yes  Upon entering residence, is there a bathroom on the main floor (no further steps)?: No (1/2 bath 1st floor, full bath 2nd floor)  Living Arrangements: Lives w/ Daughter (Dtr lives on 2nd floor of home)    Activities of Daily Living Prior to Admission  Functional Status: Independent  Completes ADLs independently?: Yes  Ambulates independently?: Yes  Does patient use assisted devices?: Yes  Assisted Devices (DME) used: Straight Cane  Does patient currently own DME?: Yes  What DME does the patient currently own?: Straight Cane, Walker, Rollator  Does patient have a history of HHC?: Yes (Hx with [facility])  Does patient currently have HHC?: No     Patient Information Continued  Income Source: Pension/care home  Does patient have prescription coverage?: Yes (Pt uses [email or web address] in [place])  Does patient receive dialysis treatments?: No     Means of Transportation  Means of Transport to [facility]:: Family transport    Social Determinants of Health (SDOH)      Flowsheet Row Most Recent Value   Housing Stability    In the last 12 months, was there a time when you were not able to pay the mortgage or rent on time? N   In the past 12 months, how many times have you moved where you were living? 0   At any time in the past 12 months, were you  homeless or living in a shelter (including now)? N   Transportation Needs    In the past 12 months, has lack of transportation kept you from medical appointments or from getting medications? no   In the past 12 months, has lack of transportation kept you from meetings, work, or from getting things needed for daily living? No   Food Insecurity    Within the past 12 months, you worried that your food would run out before you got the money to buy more. Never true   Utilities    In the past 12 months has the electric, gas, oil, or water company threatened to shut off services in your home? No          DISCHARGE DETAILS:    Discharge planning discussed with:: Patient, Dtr Kary  Freedom of Choice: Yes  Comments - Freedom of Choice: SW met bedside with patient and dtr to introduce role, complete assessment, and discuss discharge planning needs. RINKU reviewed evaluations by PT/OT. Patient/dtr requesting HHC and have consented to blanket referrals.  CM contacted family/caregiver?: Yes  Were Treatment Team discharge recommendations reviewed with patient/caregiver?: Yes  Did patient/caregiver verbalize understanding of patient care needs?: Yes  Were patient/caregiver advised of the risks associated with not following Treatment Team discharge recommendations?: Yes    Contacts  Patient Contacts: Kary (dtr)  Relationship to Patient:: Family  Contact Method: In Person  Reason/Outcome: Continuity of Care, Emergency Contact, Discharge Planning    Requested Home Health Care         Is the patient interested in HHC at discharge?: Yes  Home Health Discipline requested:: Nursing, Physical Therapy, Occupational Therapy  Home Health Agency Name:: Other  HHA External Referral Reason (only applicable if external HHA name selected): Services not provided in network or near patient location  Home Health Follow-Up Provider:: PCP  Home Health Services Needed:: Evaluate Functional Status and Safety, Gait/ADL Training,  Strengthening/Theraputic Exercises to Improve Function  Homebound Criteria Met:: Uses an Assist Device (i.e. cane, walker, etc), Requires the Assistance of Another Person for Safe Ambulation or to Leave the Home  Supporting Clincal Findings:: Limited Endurance, Fatigues Easliy in Short Distances    DME Referral Provided  Referral made for DME?: No    Other Referral/Resources/Interventions Provided:  Interventions: HHC  Referral Comments: [facility] referrals sent in [place]. Responses pending.     Treatment Team Recommendation: Home with Home Health Care  Discharge Destination Plan:: Home with Home Health Care  Transport at Discharge : Family

## 2024-08-23 NOTE — UTILIZATION REVIEW
"Initial Clinical Review    WAS OBSERVATION 08/22/2024 @ 1628 CONVERTED TO INPATIENT ADMISSION 08/23/2024 @ 1610 DUE TO CONTINUED STAY REQUIRED TO CARE FOR PATIENT WITH Stroke-like symptoms / Fall facial trauma.    Admission: Date/Time/Statement:   Admission Orders (From admission, onward)       Ordered        08/23/24 1610  INPATIENT ADMISSION  Once            08/22/24 1628  Place in Observation  Once                          Orders Placed This Encounter   Procedures    INPATIENT ADMISSION     Standing Status:   Standing     Number of Occurrences:   1     Order Specific Question:   Level of Care     Answer:   Med Surg [16]     Order Specific Question:   Estimated length of stay     Answer:   More than 2 Midnights     Order Specific Question:   Certification     Answer:   I certify that inpatient services are medically necessary for this patient for a duration of greater than two midnights. See H&P and MD Progress Notes for additional information about the patient's course of treatment.     ED Arrival Information       Expected   -    Arrival   8/22/2024 10:25    Acuity   Urgent              Means of arrival   Ambulance    Escorted by   [facility]    Admission type   Emergency              Arrival complaint   fall             Chief Complaint   Patient presents with    Fall     Patient lost footing in parking lot while shopping, falling on face and left wrist. Denies LOC, thinners. C/o wrist pain, headache, face.       Initial Presentation: 74 y.o. female to ED via EMS from home.    Admitted to observation with Dx: Stroke-like symptoms.  Presented to ED with face throbbing with pain after a fall.   Patient reports she fell face first and struck her face on the ground. Patient did not lose consciousness. Patient reports pain in the entirety of her face and describes it as \"throbbing\". Patient states she also has a laceration to the inner aspect of her upper lip. Patient also reporting pain " to her left wrist but states she is able to move it and therefore does not think it is broken.   PMHx:Hypertension, CVA, asthma, depression/anxiety, prediabetes, obesity, history of gastric bypass.  Date: 08/22/2024   On exam:  1cm stellate laceration inside upper lip; No active bleeding; Not through and through.   Left wrist: Tenderness present. No swelling or deformity. Decreased range of motion.  Imaging shows CT head:  No acute intracranial abnormality. CT facial bones:  Normal noncontrast CT of the facial bones.  Left wrist X:  No acute osseous abnormality. ED treatment: Dpt IM.  Tylenol PO.    Plan includes Telemetry.  Neuro aleksandr ks.  ASA.  Statin.  Obtain MRI brain.  Resume home BB.  Monitor BP & trend.  Check LDL.  Consult Neuro.  PT/OT/ST.  .    Day 2: 8/23/2024  Obs to IP.  Stroke-like symptoms.  Reports chest pain.  EKG review unchanged since admission.  Obtain: Trops, BNP, chest X, and give metoprolo 100mcg @ 0530 rather than 0900.  Telemetry.    Continue ASA, Lipitor, metoprolol, losartan, lasix.  Follow up ECHO.  Consult Neuro.  MRI brain - multiple old infarcts, see results below.        ED Triage Vitals [08/22/24 1031]   Temperature Pulse Respirations Blood Pressure SpO2 Pain Score   97.7 °F (36.5 °C) 57 18 (!) 181/83 96 % 8     Weight (last 2 days)       Date/Time Weight    08/23/24 1330 119 (262)    08/23/24 0441 119 (262.57)    08/22/24 2300 120 (265.43)    08/22/24 2125 120 (265.43)            Vital Signs (last 3 days)       Date/Time Temp Pulse Resp BP MAP (mmHg) SpO2 O2 Device Patient Position - Orthostatic VS Luis Enrique Coma Scale Score Pain    08/23/24 1330 -- 62 -- 186/86 -- -- -- -- -- --    08/23/24 0900 -- 62 24 186/86 123 94 % None (Room air) Lying -- --    08/23/24 0800 97.8 °F (36.6 °C) 68 32 187/74 111 98 % None (Room air) Lying 15 No Pain    08/23/24 0700 -- 58 28 209/94 134 96 % None (Room air) Lying -- --    08/23/24 0600 -- 62 21 215/97 139 97 % -- -- 15 --    08/23/24 0500 -- 62 20  225/100 143 98 % -- -- -- --    08/23/24 0441 -- 64 24 227/103 148 97 % -- -- -- --    08/23/24 0400 -- -- -- -- -- -- -- -- 15 --    08/23/24 0356 97.7 °F (36.5 °C) -- -- -- -- -- -- -- -- --    08/23/24 0200 -- 55 24 222/97 139 97 % -- -- 15 --    08/23/24 0100 -- 52 24 193/85 122 96 % -- -- 15 --    08/23/24 0000 -- 53 24 196/91 131 96 % -- -- 15 --    08/22/24 2300 -- 55 24 199/90 129 98 % -- -- 15 --    08/22/24 2200 -- 60 22 200/87 125 96 % -- -- 15 --    08/22/24 2125 -- -- -- -- -- -- -- -- 15 No Pain    08/22/24 2100 97.7 °F (36.5 °C) 51 22 214/93 134 97 % None (Room air) Lying 15 No Pain    08/22/24 2030 -- 52 18 227/95 137 96 % None (Room air) Lying -- --    08/22/24 2000 -- 55 -- 235/107 -- -- -- -- -- --    08/22/24 1811 -- -- -- -- -- -- -- -- -- 7 08/22/24 1348 -- -- -- -- -- -- -- -- -- 5 08/22/24 1330 -- -- -- -- -- -- -- -- -- 5 08/22/24 1104 -- -- -- -- -- -- -- -- -- 7 08/22/24 1033 -- -- -- -- -- -- -- -- 15 --    08/22/24 1031 97.7 °F (36.5 °C) 57 18 181/83 119 96 % None (Room air) Lying -- 8            Date and Time Eye Opening Best Verbal Response Best Motor Response Luis Enrique Coma Scale Score   08/23/24 0800 4 5 6 15   08/23/24 0600 4 5 6 15   08/23/24 0400 4 5 6 15   08/23/24 0200 4 5 6 15   08/23/24 0100 4 5 6 15   08/23/24 0000 4 5 6 15   08/22/24 2300 4 5 6 15   08/22/24 2200 4 5 6 15   08/22/24 2125 4 5 6 15   08/22/24 2100 4 5 6 15   08/22/24 1033 4 5 6 15       Pertinent Labs/Diagnostic Test Results:   Radiology:  CTA head and neck with and without contrast   Final Interpretation by Gerard Carmona MD (08/22 5272)   CT Brain: No CT evidence for a new large acute vascular distribution infarct.      Incidentally noted enhancing lesion along the right sphenoid wing likely represents a meningioma in the absence of any known primary malignancy and only appreciated on postcontrast imaging. Possible additional meningioma noted along the anterior falx.    Contrast-enhanced is recommended  for further evaluation.      CT Angiography: No definite evidence for high-grade stenosis, focal occlusion or vascular aneurysm of the cervical or intracranial vessels.      Atherosclerotic irregularity with mild to moderate narrowing of the basilar artery. Component of decreased caliber of the basilar artery is likely in part congenital in nature secondary to fetal origins of the posterior cerebral arteries. No focal    occlusion.      Hyperdensity along the anterolateral aspect of the right oral cavity may represent a piece of candy. Please correlate with direct visual inspection to exclude the possibility of an enhancing lesion in this region.      XR wrist 3+ views LEFT   Final Interpretation by Gerard Carmona MD (08/22 1531)   No acute osseous abnormality.      CT head wo contrast   Final Interpretation by Tony Palomares MD (08/22 1132)   No acute intracranial abnormality.      CT spine cervical without contrast   Final Interpretation by Tony Palomares MD (08/22 1137)   No cervical spine fracture or traumatic malalignment.      CT facial bones without contrast   Final Interpretation by Tony Palomares MD (08/22 1141)   Normal noncontrast CT of the facial bones.      MRI Inpatient Order    08/23/2024   1. No acute infarct. Multiple old lacunar infarcts involving the bilateral basal ganglia, left thalamus, right paramedian midbrain, and bilateral cerebellar hemispheres. Old infarct is noted involving the periventricular corona radiata white matter of   the right frontal lobe.   2. Extra-axial FLAIR hyperintense lesion, along the right sphenoid wing, corresponding to the enhancing lesion identified on CT angiogram of the head and neck. This is most indicative of a meningioma. No centimeter genic edema or significant mass effect   noted.   3. Mild chronic microangiopathic changes involving the white matter of both cerebral hemispheres.      XR chest portable    08/23/2024   No acute  cardiopulmonary disease.        Cardiology:  2024  EC, Sinus bradycardia  Cannot rule out Anterior infarct (cited on or before 22-AUG-2024)    2024  EC, Normal sinus rhythm  Nonspecific ST abnormality  Otherwise normal ECG    GI:  No orders to display     Results from last 7 days   Lab Units 24  0440 24  1514   WBC Thousand/uL 7.77 8.28   HEMOGLOBIN g/dL 13.2 13.3   HEMATOCRIT % 39.8 40.7   PLATELETS Thousands/uL 165 182   TOTAL NEUT ABS Thousands/µL 5.93 6.33     Results from last 7 days   Lab Units 24  0440 24  1514   SODIUM mmol/L 138 138   POTASSIUM mmol/L 3.8 4.5   CHLORIDE mmol/L 104 107   CO2 mmol/L 29 26   ANION GAP mmol/L 5 5   BUN mg/dL 13 15   CREATININE mg/dL 0.62 0.60   EGFR ml/min/1.73sq m 90 91   CALCIUM mg/dL 8.9 8.8   MAGNESIUM mg/dL  --  2.4     Results from last 7 days   Lab Units 24  1514   AST U/L 31   ALT U/L 24   ALK PHOS U/L 107*   TOTAL PROTEIN g/dL 6.1*   ALBUMIN g/dL 3.6   TOTAL BILIRUBIN mg/dL 0.57     Results from last 7 days   Lab Units 24  0440 24  1514   GLUCOSE RANDOM mg/dL 121 119       Results from last 7 days   Lab Units 24  0440   BNP pg/mL 232*     ED Treatment-Medication Administration from 2024 1025 to 2024         Date/Time Order Dose Route Action     2024 1105 tetanus-diphtheria-acellular pertussis (BOOSTRIX) IM injection 0.5 mL 0.5 mL Intramuscular Given     2024 1104 acetaminophen (TYLENOL) tablet 975 mg 975 mg Oral Given     2024 1645 iohexol (OMNIPAQUE) 350 MG/ML injection (MULTI-DOSE) 100 mL 85 mL Intravenous Given     2024 metoprolol tartrate (LOPRESSOR) tablet 100 mg 100 mg Oral Given     2024 182 atorvastatin (LIPITOR) tablet 40 mg 40 mg Oral Given     2024 182 enoxaparin (LOVENOX) subcutaneous injection 40 mg 40 mg Subcutaneous Given     2024 181 acetaminophen (TYLENOL) tablet 975 mg 975 mg Oral Given            Past Medical  History:   Diagnosis Date    Anemia     Anxiety disorder     Arthritis     Blood disorder     Bronchitis     Chronic pain     Depression     Dizziness     GERD (gastroesophageal reflux disease)     History of transfusion     Hypertension     Kidney stone     Kidney stones     Lightheadedness     Obesity     SOB (shortness of breath)     Stroke (HCC)      Present on Admission:   Essential hypertension   Gastroesophageal reflux disease   Pre-diabetes   Recurrent major depressive disorder, in full remission (HCC)   Anxiety disorder      Admitting Diagnosis: Abrasion [T14.8XXA]  Facial laceration [S01.81XA]  Bony abnormality [Q79.9]  Stroke-like symptoms [R29.90]  Pain [R52]  Facial trauma, initial encounter [S09.93XA]  Laceration of oral cavity, initial encounter [S01.512A]  Age/Sex: 74 y.o. female  Admission Orders:  Dysphagia Screen > CV Diet  Up & OOB as tolerated  Daily weight  I&O q8h  Neuro checks q1h x 4 hours, q2h x 8 hours, q4h x 72hours  Obtain MR brain  PT/OT/ST  Sav SCDs    Scheduled Medications:  aspirin, 81 mg, Oral, Daily  atorvastatin, 40 mg, Oral, QPM  calcium carbonate-vitamin D, 1 tablet, Oral, Daily With Breakfast  citalopram, 40 mg, Oral, Daily  DULoxetine, 30 mg, Oral, Daily  enoxaparin, 40 mg, Subcutaneous, BID  furosemide, 20 mg, Oral, Daily  losartan, 100 mg, Oral, Daily  metoprolol tartrate, 100 mg, Oral, BID  pantoprazole, 40 mg, Oral, Daily      Continuous IV Infusions:     PRN Meds:  albuterol, 2 puff, Inhalation, Q6H PRN  hydrALAZINE, 10 mg, Intravenous, Q4H PRN  lidocaine, 1 patch, Topical, HS PRN        IP CONSULT TO NEUROLOGY  IP CONSULT TO CASE MANAGEMENT  IP CONSULT TO NUTRITION SERVICES    Network Utilization Review Department  ATTENTION: Please call with any questions or concerns to [phone] and carefully listen to the prompts so that you are directed to the right person. All voicemails are confidential.   For Discharge needs, contact Care Management DC Support Team at  [phone] opt. 2  Send all requests for admission clinical reviews, approved or denied determinations and any other requests to dedicated fax number below belonging to the campus where the patient is receiving treatment. List of dedicated fax numbers for the Facilities:  FACILITY NAME UR FAX NUMBER   ADMISSION DENIALS (Administrative/Medical Necessity) [phone]   DISCHARGE SUPPORT TEAM (NETWORK) [phone]   PARENT CHILD HEALTH (Maternity/NICU/Pediatrics) [phone]   [facility] [phone]   [facility] [phone]   [facility] [phone]   [facility] [phone]   [facility] [phone]   [facility] [phone]   [facility] [phone]   [facility] [phone]   [facility] [phone]   [facility] [phone]   [facility] [phone]   [facility] [phone]

## 2024-08-23 NOTE — SPEECH THERAPY NOTE
Consult received.  Records reviewed.  Pt admitted c symptoms concerning for CVA/TIA.  RI results negative for acute infarction (but h/o remote lacunar infarcts). Pt passed RN Dysphagia Assessment and no s/s dyspahgia reported by staff or pt.  Communication deficits also denied and none noted in informal conversational assessment.   Agree with PT/OT evaluations/tx as indicated. No additional inpatient Speech Pathology evaluation appears indicated at this time.  Please re-consult if additional concerns arise. Thank you.   Lita Amor MS CCC-SLP   [place] License [id number]

## 2024-08-23 NOTE — PROGRESS NOTES
[facility] Progress Note - Karin Nieves 74 y.o. female MRN: [id number]    Unit/Bed#: ICU 12 Encounter: [id number]      Assessment/Plan:  * Stroke-like symptoms  Assessment & Plan  Reports hx of prior TIA, macular degeneration. Pt reports falling onto face in [employer] parking lot after going to food pantry.  Patient states that she has had some dizziness and vision issues where she sees waves when looking linearly. Denies smoking, alcohol use. Denies chest pain, SOB, new smells, dizziness, weakness, light-headedness.     ED: CT head, CT facial bones and Xray left wrist unremarkable. Labs unremarkable.     Last echo on 5/21/23 showed EF 65% and G1DD.   Last lipid panel 2021 showed LDL 99, total chol 157    CTA head: No definite evidence for high-grade stenosis, focal occlusion or vascular aneurysm of the cervical or intracranial vessels. Atherosclerotic irregularity with mild to moderate narrowing of the basilar artery, Hyperdensity along the anterolateral aspect of the right oral cavity.     MRI brain: No acute infarct. Multiple old lacunar infarcts involving the bilateral basal ganglia, left thalamus, right paramedian midbrain, and bilateral cerebellar hemispheres. Old infarct is noted involving the periventricular corona radiata white matter of the right frontal lobe.  -Extra-axial FLAIR hyperintense lesion, along the right sphenoid wing, corresponding to the enhancing lesion identified on CT angiogram of the head and neck. This is most indicative of a meningioma. No centimeter genic edema or significant mass effect   noted.  -Mild chronic microangiopathic changes involving the white matter of both cerebral hemispheres.    Continue ASA 81 mg   Continue lipitor 40 mg daily   Continue home medication metoprolol 100 mg twice daily, losartan 100 mg daily and Lasix 20 mg daily  Echocardiogram pending  Neurology consulted; recommendations appreciated  Continue ASA 81 and Lipitor 40 mg after discharge in  "setting of hyperlipidemia and prediabetes    Gastroesophageal reflux disease  Assessment & Plan  Chronic.     Home medication protonix 40 mg daily.    Continue home medication    H/O bariatric surgery - bypass  Assessment & Plan  States that she takes multivitamins, B12, prevangen and prevavision.     Last B12 642 and last folate 7.9 in 8/2024.    Continue multivitamin.    Pre-diabetes  Assessment & Plan  Last A1c was 5.9 on 8/6/24.     Diet and exercise counseling.   Repeat in 6 months outpatient    Recurrent major depressive disorder, in full remission ([facility])  Assessment & Plan  See anxiety.     Anxiety disorder  Assessment & Plan  Chronic    Home medication Citalopram 40 mg daily. Does not recall taking Cymbalta 30 mg daily.     Continue home medication    Morbid obesity with BMI of 40.0-44.9, adult ([facility])  Assessment & Plan  Nutrition and exercise counseling.     Essential hypertension  Assessment & Plan  Chronic     BP elevated in ED. Home medication furosemide 20 mg daily PRN, losartan 100 mg daily, metoprolol 100 mg twice daily.     Restarted all home medication  Hydralazine 10 mg for SBP >160   Continue to monitor blood pressure closely        Subjective:   Pt seen and examined at bedside. Patient states that she feels much better. Denies chest pain, shortness of breath, numbness, weakness, new symptoms or any other complaints at this time.     Objective:     Vitals: Blood pressure (!) 186/86, pulse 62, temperature 97.8 °F (36.6 °C), temperature source Temporal, resp. rate (!) 24, height 5' 6\" (1.676 m), weight 119 kg (262 lb), SpO2 94%, not currently breastfeeding.,Body mass index is 42.29 kg/m².  Wt Readings from Last 3 Encounters:   08/23/24 119 kg (262 lb)   08/06/24 121 kg (267 lb)   03/26/24 121 kg (266 lb)       Intake/Output Summary (Last 24 hours) at 8/23/2024 1447  Last data filed at 8/23/2024 0701  Gross per 24 hour   Intake 200 ml   Output 800 ml   Net -600 ml       Physical Exam  Vitals and " nursing note reviewed.   Constitutional:       General: She is not in acute distress.     Appearance: She is well-developed. She is obese.   HENT:      Head: Normocephalic and atraumatic.   Eyes:      Conjunctiva/sclera: Conjunctivae normal.   Cardiovascular:      Rate and Rhythm: Normal rate and regular rhythm.      Heart sounds: No murmur heard.  Pulmonary:      Effort: Pulmonary effort is normal. No respiratory distress.      Breath sounds: Normal breath sounds.   Abdominal:      Palpations: Abdomen is soft.      Tenderness: There is no abdominal tenderness.   Musculoskeletal:         General: No swelling.      Cervical back: Neck supple.   Skin:     General: Skin is warm and dry.      Capillary Refill: Capillary refill takes less than 2 seconds.      Comments: Superficial injury of nose and mucosal injury of upper lip which are healing appropriately   Neurological:      General: No focal deficit present.      Mental Status: She is alert. Mental status is at baseline.      Cranial Nerves: No cranial nerve deficit.      Sensory: No sensory deficit.      Motor: No weakness.      Coordination: Coordination normal.   Psychiatric:         Mood and Affect: Mood normal.         Behavior: Behavior normal.           Recent Results (from the past 24 hour(s))   ECG 12 lead    Collection Time: 08/22/24  3:06 PM   Result Value Ref Range    Ventricular Rate 57 BPM    Atrial Rate 57 BPM    VT Interval 164 ms    QRSD Interval 94 ms    QT Interval 450 ms    QTC Interval 438 ms    P [place] 53 degrees    QRS Axis 31 degrees    T Wave Axis 66 degrees   CBC and differential    Collection Time: 08/22/24  3:14 PM   Result Value Ref Range    WBC 8.28 4.31 - 10.16 Thousand/uL    RBC 4.33 3.81 - 5.12 Million/uL    Hemoglobin 13.3 11.5 - 15.4 g/dL    Hematocrit 40.7 34.8 - 46.1 %    MCV 94 82 - 98 fL    MCH 30.7 26.8 - 34.3 pg    MCHC 32.7 31.4 - 37.4 g/dL    RDW 12.7 11.6 - 15.1 %    MPV 9.8 8.9 - 12.7 fL    Platelets 182 149 - 390  Thousands/uL    nRBC 0 /100 WBCs    Segmented % 76 (H) 43 - 75 %    Immature Grans % 0 0 - 2 %    Lymphocytes % 14 14 - 44 %    Monocytes % 7 4 - 12 %    Eosinophils Relative 2 0 - 6 %    Basophils Relative 1 0 - 1 %    Absolute Neutrophils 6.33 1.85 - 7.62 Thousands/µL    Absolute Immature Grans 0.03 0.00 - 0.20 Thousand/uL    Absolute Lymphocytes 1.13 0.60 - 4.47 Thousands/µL    Absolute Monocytes 0.57 0.17 - 1.22 Thousand/µL    Eosinophils Absolute 0.16 0.00 - 0.61 Thousand/µL    Basophils Absolute 0.06 0.00 - 0.10 Thousands/µL   Comprehensive metabolic panel    Collection Time: 08/22/24  3:14 PM   Result Value Ref Range    Sodium 138 135 - 147 mmol/L    Potassium 4.5 3.5 - 5.3 mmol/L    Chloride 107 96 - 108 mmol/L    CO2 26 21 - 32 mmol/L    ANION GAP 5 4 - 13 mmol/L    BUN 15 5 - 25 mg/dL    Creatinine 0.60 0.60 - 1.30 mg/dL    Glucose 119 65 - 140 mg/dL    Calcium 8.8 8.4 - 10.2 mg/dL    AST 31 13 - 39 U/L    ALT 24 7 - 52 U/L    Alkaline Phosphatase 107 (H) 34 - 104 U/L    Total Protein 6.1 (L) 6.4 - 8.4 g/dL    Albumin 3.6 3.5 - 5.0 g/dL    Total Bilirubin 0.57 0.20 - 1.00 mg/dL    eGFR 91 ml/min/1.73sq m   Magnesium    Collection Time: 08/22/24  3:14 PM   Result Value Ref Range    Magnesium 2.4 1.9 - 2.7 mg/dL   Lipid Panel with Direct LDL reflex    Collection Time: 08/23/24  4:40 AM   Result Value Ref Range    Cholesterol 172 See Comment mg/dL    Triglycerides 96 See Comment mg/dL    HDL, Direct 41 (L) >=50 mg/dL    LDL Calculated 112 (H) 0 - 100 mg/dL   Basic metabolic panel    Collection Time: 08/23/24  4:40 AM   Result Value Ref Range    Sodium 138 135 - 147 mmol/L    Potassium 3.8 3.5 - 5.3 mmol/L    Chloride 104 96 - 108 mmol/L    CO2 29 21 - 32 mmol/L    ANION GAP 5 4 - 13 mmol/L    BUN 13 5 - 25 mg/dL    Creatinine 0.62 0.60 - 1.30 mg/dL    Glucose 121 65 - 140 mg/dL    Glucose, Fasting 121 (H) 65 - 99 mg/dL    Calcium 8.9 8.4 - 10.2 mg/dL    eGFR 90 ml/min/1.73sq m   CBC and differential     Collection Time: 08/23/24  4:40 AM   Result Value Ref Range    WBC 7.77 4.31 - 10.16 Thousand/uL    RBC 4.29 3.81 - 5.12 Million/uL    Hemoglobin 13.2 11.5 - 15.4 g/dL    Hematocrit 39.8 34.8 - 46.1 %    MCV 93 82 - 98 fL    MCH 30.8 26.8 - 34.3 pg    MCHC 33.2 31.4 - 37.4 g/dL    RDW 12.7 11.6 - 15.1 %    MPV 9.2 8.9 - 12.7 fL    Platelets 165 149 - 390 Thousands/uL    nRBC 0 /100 WBCs    Segmented % 77 (H) 43 - 75 %    Immature Grans % 0 0 - 2 %    Lymphocytes % 11 (L) 14 - 44 %    Monocytes % 8 4 - 12 %    Eosinophils Relative 3 0 - 6 %    Basophils Relative 1 0 - 1 %    Absolute Neutrophils 5.93 1.85 - 7.62 Thousands/µL    Absolute Immature Grans 0.01 0.00 - 0.20 Thousand/uL    Absolute Lymphocytes 0.86 0.60 - 4.47 Thousands/µL    Absolute Monocytes 0.65 0.17 - 1.22 Thousand/µL    Eosinophils Absolute 0.25 0.00 - 0.61 Thousand/µL    Basophils Absolute 0.07 0.00 - 0.10 Thousands/µL   B-Type Natriuretic Peptide(BNP)    Collection Time: 08/23/24  4:40 AM   Result Value Ref Range     (H) 0 - 100 pg/mL   ECG 12 lead    Collection Time: 08/23/24  5:39 AM   Result Value Ref Range    Ventricular Rate 67 BPM    Atrial Rate 67 BPM    SD Interval 160 ms    QRSD Interval 96 ms    QT Interval 432 ms    QTC Interval 456 ms    P Axis 63 degrees    QRS Axis 50 degrees    T Wave Axis 79 degrees   High Sensitivity Troponin I Random    Collection Time: 08/23/24  5:48 AM   Result Value Ref Range    HS TnI random 8 8 - 18 ng/L   Echo complete w/ contrast if indicated    Collection Time: 08/23/24  2:00 PM   Result Value Ref Range    BSA 2.24 m2       Current Facility-Administered Medications   Medication Dose Route Frequency Provider Last Rate Last Admin    albuterol (PROVENTIL HFA,VENTOLIN HFA) inhaler 2 puff  2 puff Inhalation Q6H PRN Heriberto Trejo MD        aspirin chewable tablet 81 mg  81 mg Oral Daily Heriberto Trejo MD   81 mg at 08/23/24 0805    atorvastatin (LIPITOR) tablet 40 mg  40 mg Oral QPM Heriberto Trejo MD   40 mg at  08/22/24 1826    calcium carbonate-vitamin D 500 mg-5 mcg tablet 1 tablet  1 tablet Oral Daily With Breakfast Heriberto Trejo MD   1 tablet at 08/23/24 0805    citalopram (CeleXA) tablet 40 mg  40 mg Oral Daily Heriberto Trejo MD   40 mg at 08/23/24 0805    DULoxetine (CYMBALTA) delayed release capsule 30 mg  30 mg Oral Daily Luci Sloan MD        enoxaparin (LOVENOX) subcutaneous injection 40 mg  40 mg Subcutaneous BID Heriberto Trejo MD   40 mg at 08/23/24 0805    furosemide (LASIX) tablet 20 mg  20 mg Oral Daily Luci Sloan MD   20 mg at 08/23/24 0647    hydrALAZINE (APRESOLINE) injection 10 mg  10 mg Intravenous Q4H PRN Luci Sloan MD        lidocaine (LIDODERM) 5 % patch 1 patch  1 patch Topical HS PRN Heriberto Trejo MD        losartan (COZAAR) tablet 100 mg  100 mg Oral Daily Yolande Sen MD   100 mg at 08/23/24 1238    metoprolol tartrate (LOPRESSOR) tablet 100 mg  100 mg Oral BID Mary Bolaños DO   100 mg at 08/23/24 0542    pantoprazole (PROTONIX) EC tablet 40 mg  40 mg Oral Daily Heriberto Trejo MD   40 mg at 08/23/24 0805       Invasive Devices       Peripheral Intravenous Line  Duration             Peripheral IV 08/22/24 Distal;Right;Upper;Ventral (anterior) Arm <1 day              Drain  Duration             External Urinary Catheter <1 day                    Lab, Imaging and other studies: I have personally reviewed pertinent reports.    VTE Pharmacologic Prophylaxis: Enoxaparin (Lovenox)  VTE Mechanical Prophylaxis: sequential compression device    Luci Sloan MD

## 2024-08-24 VITALS
DIASTOLIC BLOOD PRESSURE: 59 MMHG | HEIGHT: 66 IN | TEMPERATURE: 98.4 F | WEIGHT: 260.14 LBS | BODY MASS INDEX: 41.81 KG/M2 | HEART RATE: 67 BPM | OXYGEN SATURATION: 96 % | RESPIRATION RATE: 16 BRPM | SYSTOLIC BLOOD PRESSURE: 138 MMHG

## 2024-08-24 PROBLEM — R29.90 STROKE-LIKE SYMPTOMS: Status: RESOLVED | Noted: 2024-08-22 | Resolved: 2024-08-24

## 2024-08-24 LAB
ANION GAP SERPL CALCULATED.3IONS-SCNC: 6 MMOL/L (ref 4–13)
BASOPHILS # BLD AUTO: 0.06 THOUSANDS/ÂΜL (ref 0–0.1)
BASOPHILS NFR BLD AUTO: 1 % (ref 0–1)
BUN SERPL-MCNC: 13 MG/DL (ref 5–25)
CALCIUM SERPL-MCNC: 8.9 MG/DL (ref 8.4–10.2)
CARDIAC TROPONIN I PNL SERPL HS: 8 NG/L (ref 8–18)
CHLORIDE SERPL-SCNC: 107 MMOL/L (ref 96–108)
CO2 SERPL-SCNC: 25 MMOL/L (ref 21–32)
CREAT SERPL-MCNC: 0.53 MG/DL (ref 0.6–1.3)
EOSINOPHIL # BLD AUTO: 0.25 THOUSAND/ÂΜL (ref 0–0.61)
EOSINOPHIL NFR BLD AUTO: 2 % (ref 0–6)
ERYTHROCYTE [DISTWIDTH] IN BLOOD BY AUTOMATED COUNT: 13 % (ref 11.6–15.1)
GFR SERPL CREATININE-BSD FRML MDRD: 95 ML/MIN/1.73SQ M
GLUCOSE SERPL-MCNC: 136 MG/DL (ref 65–140)
GLUCOSE SERPL-MCNC: 140 MG/DL (ref 65–140)
HCT VFR BLD AUTO: 40.3 % (ref 34.8–46.1)
HGB BLD-MCNC: 13.4 G/DL (ref 11.5–15.4)
IMM GRANULOCYTES # BLD AUTO: 0.03 THOUSAND/UL (ref 0–0.2)
IMM GRANULOCYTES NFR BLD AUTO: 0 % (ref 0–2)
LYMPHOCYTES # BLD AUTO: 0.91 THOUSANDS/ÂΜL (ref 0.6–4.47)
LYMPHOCYTES NFR BLD AUTO: 9 % (ref 14–44)
MAGNESIUM SERPL-MCNC: 2.1 MG/DL (ref 1.9–2.7)
MCH RBC QN AUTO: 30.9 PG (ref 26.8–34.3)
MCHC RBC AUTO-ENTMCNC: 33.3 G/DL (ref 31.4–37.4)
MCV RBC AUTO: 93 FL (ref 82–98)
MONOCYTES # BLD AUTO: 0.84 THOUSAND/ÂΜL (ref 0.17–1.22)
MONOCYTES NFR BLD AUTO: 8 % (ref 4–12)
MRSA NOSE QL CULT: NORMAL
NEUTROPHILS # BLD AUTO: 8.39 THOUSANDS/ÂΜL (ref 1.85–7.62)
NEUTS SEG NFR BLD AUTO: 80 % (ref 43–75)
NRBC BLD AUTO-RTO: 0 /100 WBCS
PLATELET # BLD AUTO: 179 THOUSANDS/UL (ref 149–390)
PMV BLD AUTO: 9.4 FL (ref 8.9–12.7)
POTASSIUM SERPL-SCNC: 3.6 MMOL/L (ref 3.5–5.3)
RBC # BLD AUTO: 4.33 MILLION/UL (ref 3.81–5.12)
SODIUM SERPL-SCNC: 138 MMOL/L (ref 135–147)
WBC # BLD AUTO: 10.48 THOUSAND/UL (ref 4.31–10.16)

## 2024-08-24 PROCEDURE — 99239 HOSP IP/OBS DSCHRG MGMT >30: CPT | Performed by: INTERNAL MEDICINE

## 2024-08-24 PROCEDURE — 84484 ASSAY OF TROPONIN QUANT: CPT | Performed by: INTERNAL MEDICINE

## 2024-08-24 PROCEDURE — 82948 REAGENT STRIP/BLOOD GLUCOSE: CPT

## 2024-08-24 PROCEDURE — 80048 BASIC METABOLIC PNL TOTAL CA: CPT

## 2024-08-24 PROCEDURE — 83735 ASSAY OF MAGNESIUM: CPT

## 2024-08-24 PROCEDURE — NC001 PR NO CHARGE: Performed by: PSYCHIATRY & NEUROLOGY

## 2024-08-24 PROCEDURE — 85025 COMPLETE CBC W/AUTO DIFF WBC: CPT

## 2024-08-24 RX ORDER — ATORVASTATIN CALCIUM 40 MG/1
40 TABLET, FILM COATED ORAL EVERY EVENING
Qty: 30 TABLET | Refills: 0 | Status: SHIPPED | OUTPATIENT
Start: 2024-08-24

## 2024-08-24 RX ORDER — POLYETHYLENE GLYCOL 3350 17 G/17G
17 POWDER, FOR SOLUTION ORAL DAILY PRN
Qty: 238 G | Refills: 0 | Status: SHIPPED | OUTPATIENT
Start: 2024-08-24

## 2024-08-24 RX ORDER — NIFEDIPINE 30 MG/1
30 TABLET, EXTENDED RELEASE ORAL DAILY
Qty: 30 TABLET | Refills: 0 | Status: SHIPPED | OUTPATIENT
Start: 2024-08-25

## 2024-08-24 RX ORDER — ASPIRIN 81 MG/1
81 TABLET, CHEWABLE ORAL DAILY
Qty: 30 TABLET | Refills: 0 | Status: SHIPPED | OUTPATIENT
Start: 2024-08-25

## 2024-08-24 RX ADMIN — FUROSEMIDE 20 MG: 20 TABLET ORAL at 09:23

## 2024-08-24 RX ADMIN — METOPROLOL TARTRATE 100 MG: 100 TABLET, FILM COATED ORAL at 09:23

## 2024-08-24 RX ADMIN — Medication 1 TABLET: at 09:27

## 2024-08-24 RX ADMIN — ASPIRIN 81 MG CHEWABLE TABLET 81 MG: 81 TABLET CHEWABLE at 09:23

## 2024-08-24 RX ADMIN — PANTOPRAZOLE SODIUM 40 MG: 40 TABLET, DELAYED RELEASE ORAL at 09:23

## 2024-08-24 RX ADMIN — CITALOPRAM HYDROBROMIDE 40 MG: 20 TABLET ORAL at 09:23

## 2024-08-24 RX ADMIN — DULOXETINE HYDROCHLORIDE 30 MG: 30 CAPSULE, DELAYED RELEASE ORAL at 09:23

## 2024-08-24 RX ADMIN — LOSARTAN POTASSIUM 100 MG: 50 TABLET, FILM COATED ORAL at 09:23

## 2024-08-24 RX ADMIN — HYDRALAZINE HYDROCHLORIDE 10 MG: 20 INJECTION INTRAMUSCULAR; INTRAVENOUS at 04:05

## 2024-08-24 RX ADMIN — ENOXAPARIN SODIUM 40 MG: 40 INJECTION SUBCUTANEOUS at 09:24

## 2024-08-24 RX ADMIN — NIFEDIPINE 30 MG: 30 TABLET, EXTENDED RELEASE ORAL at 09:23

## 2024-08-24 NOTE — DISCHARGE SUMMARY
Discharge Summary - [facility]     Patient Information: Karin Nieves 74 y.o. female MRN: [id number]  Unit/Bed#: ICU 12 Encounter: [id number]     Admitting Physician: Yolande Sen MD  Discharging Physician/Practitioner: Yolande Sen MD   PCP: Debby Dawn MD  Admission Date:   Admission Orders (From admission, onward)       Ordered        08/23/24 1610  INPATIENT ADMISSION  Once            08/22/24 1628  Place in Observation  Once                          Discharge Date: 08/24/24      Reason for Admission: Abrasion [T14.8XXA]  Facial laceration [S01.81XA]  Bony abnormality [Q79.9]  Stroke-like symptoms [R29.90]  Pain [R52]  Facial trauma, initial encounter [S09.93XA]  Laceration of oral cavity, initial encounter [S01.512A]     Discharge Diagnoses:      Principal Problem:    Stroke-like symptoms  Active Problems:    Essential hypertension    Gastroesophageal reflux disease    Morbid obesity with BMI of 40.0-44.9, adult ([facility])    Anxiety disorder    Recurrent major depressive disorder, in full remission ([facility])    Pre-diabetes    H/O bariatric surgery - bypass  Resolved Problems:    * No resolved hospital problems. *       Consultations During Hospital Stay:  ·       Neurology     Procedures Performed:   ·       None     Significant Findings / Test Results:   8/23: K3.8, trops 8,   MRI: no acute infarct and mild chronic microangiopathic changes  8/22: Alk Phos 107, Total protein 6.1, K 4.5, Hb 13.3, WBC 8.28, Na 138  Lipid: total 172, trig 96, HDL 41,     CT head: No acute intracranial abnormality.  CT spine cervical without contrast: No cervical spine fracture or traumatic malalignment.  CT facial bones without contrast: Normal noncontrast CT of the facial bones.  CTA head and neck with and without contrast:      Incidental Findings:   ·       None       Test Results Pending at Discharge (will require follow up):   ·       None     Outpatient Tests  Requested:  ·       None     Outpatient follow-up Requested:  ·       Neurology (strokes and Meningioma) and Ophthalmology (vision problems)    Complications:  None    Things to address at first visit after hospitalization   - Have you had any more falls?  - Has your BP been elevated since leaving?  - Any worsening vision problems?  - How are your wrist and face healing?    Hospital Course:      Karin Nieves is a 74 y.o. female patient with PMH of essential HTN, Anxiety and MDD, GERD, and gastric bypass,  who originally presented to the hospital on 8/22/2024 due to fall onto face and wrist after increased difficulty with ambulation and vision for days leading up to onset. Patient admitted for stroke-like sxs. and imaging was done to rule out acute intracranial pathology, which it did. Stroke workup was also negative. Patient was also managed with aspirin and statin since she had old lacunar infarcts seen on MRI. Blood pressure was found to be elevated so patient's BP meds were optimized, including addition of newer meds to stabilize it. Patient is stable and feeling better, therefore cleared for discharge home with home PT.    * Stroke-like symptoms  Assessment & Plan  Reports hx of prior TIA, macular degeneration. Pt reports falling onto face in [employer] parking lot after going to food pantry.  Patient states that she has had some dizziness and vision issues where she sees waves when looking linearly. Denies smoking, alcohol use. Denies chest pain, SOB, new smells, dizziness, weakness, light-headedness.     ED: CT head, CT facial bones and Xray left wrist unremarkable. Labs unremarkable.     Last echo on 5/21/23 showed EF 65% and G1DD.   Last lipid panel 2021 showed LDL 99, total chol 157    CTA head: No definite evidence for high-grade stenosis, focal occlusion or vascular aneurysm of the cervical or intracranial vessels. Atherosclerotic irregularity with mild to moderate narrowing of the basilar artery,  Hyperdensity along the anterolateral aspect of the right oral cavity.     MRI brain: No acute infarct. Multiple old lacunar infarcts involving the bilateral basal ganglia, left thalamus, right paramedian midbrain, and bilateral cerebellar hemispheres. Old infarct is noted involving the periventricular corona radiata white matter of the right frontal lobe.  -Extra-axial FLAIR hyperintense lesion, along the right sphenoid wing, corresponding to the enhancing lesion identified on CT angiogram of the head and neck. This is most indicative of a meningioma. No centimeter genic edema or significant mass effect   noted.  -Mild chronic microangiopathic changes involving the white matter of both cerebral hemispheres.    Discharged with ASA 81 mg, lipitor 40 mg daily   Discharged with home medication metoprolol 100 mg twice daily, losartan 100 mg daily and Lasix 20 mg daily  8/23 Echocardiogram: EF 58%  Neurology consulted; recommendations appreciated  Continue ASA 81 and Lipitor 40 mg after discharge in setting of hyperlipidemia and prediabetes    Essential hypertension  Assessment & Plan  Chronic     BP elevated in ED. Home medication furosemide 20 mg daily PRN, losartan 100 mg daily, metoprolol 100 mg twice daily.     [employer]   Restarted all home medication and discharged with.  Discharged with 30 mg Nifedipine daily    Gastroesophageal reflux disease  Assessment & Plan  Chronic.     Home medication protonix 40 mg daily.    Discharged with home Protonix 40 mg daily and Calcium Carbonate-vitamin D daily    H/O bariatric surgery - bypass  Assessment & Plan  States that she takes multivitamins, B12, prevangen and prevavision.     Last B12 642 and last folate 7.9 in 8/2024.    Continue multivitamin.    Pre-diabetes  Assessment & Plan  Last A1c was 5.9 on 8/6/24.     Diet and exercise counseling.   Repeat in 6 months outpatient    Recurrent major depressive disorder, in full remission (H[facility])  Assessment & Plan  See  "anxiety.     Anxiety disorder  Assessment & Plan  Chronic    Home medication Citalopram 40 mg daily. Does not recall taking Cymbalta 30 mg daily.     Discharged with home medication    Morbid obesity with BMI of 40.0-44.9, adult ([facility])  Assessment & Plan  Nutrition and exercise counseling.           Condition at Discharge: good      Discharge Day Visit / Exam:      Vitals: Blood Pressure: 138/59 (08/24/24 1309)  Pulse: 67 (08/24/24 1309)  Temperature: 98.1 °F (36.7 °C) (08/24/24 0800)  Temp Source: Temporal (08/24/24 0800)  Respirations: 18 (08/24/24 0800)  Height: 5' 6\" (167.6 cm) (08/23/24 1330)  Weight - Scale: 118 kg (260 lb 2.3 oz) (08/24/24 0600)  SpO2: 96 % (08/24/24 1309)  Exam:   Physical Exam  Constitutional:       General: She is not in acute distress.     Appearance: Normal appearance. She is obese.   HENT:      Head: Normocephalic and atraumatic.      Nose: Nose normal.      Mouth/Throat:      Mouth: Mucous membranes are moist.      Pharynx: Oropharynx is clear.   Cardiovascular:      Rate and Rhythm: Normal rate and regular rhythm.      Pulses: Normal pulses.      Heart sounds: Normal heart sounds.   Pulmonary:      Effort: Pulmonary effort is normal. No respiratory distress.      Breath sounds: Normal breath sounds.   Abdominal:      General: Abdomen is flat. Bowel sounds are normal.      Palpations: Abdomen is soft.      Tenderness: There is no abdominal tenderness. There is no guarding or rebound.   Musculoskeletal:         General: Normal range of motion.      Cervical back: Normal range of motion and neck supple.      Right lower leg: No edema.      Left lower leg: No edema.   Skin:     General: Skin is warm and dry.   Neurological:      General: No focal deficit present.      Mental Status: She is alert and oriented to person, place, and time. Mental status is at baseline.   Psychiatric:         Mood and Affect: Mood normal.         Behavior: Behavior normal.         Thought Content: Thought " content normal.         Judgment: Judgment normal.            Discussion with Family: None   Patient Information Sharing: With the consent of Karin Nieves, their loved ones were notified today by inpatient team of the patient’s condition and current plan.  All questions answered.  Karin Nieves does not wish inpatient team to notify their loved ones of their condition and current plan.      Discharge instructions/Information to patient and family:   See after visit summary for information provided to patient and family.       Discharge Medications:     Medication List      START taking these medications     aspirin 81 mg chewable tablet; Chew 1 tablet (81 mg total) daily; Start   taking on: August 25, 2024   atorvastatin 40 mg tablet; Commonly known as: LIPITOR; Take 1 tablet (40   mg total) by mouth every evening   NIFEdipine 30 mg 24 hr tablet; Commonly known as: PROCARDIA XL; Take 1   tablet (30 mg total) by mouth daily; Start taking on: August 25, 2024   polyethylene glycol 17 GM/SCOOP powder; Commonly known as: GLYCOLAX;   Take 17 g by mouth daily as needed (for constipation)     CONTINUE taking these medications     albuterol 90 mcg/act inhaler; Commonly known as: Proventil HFA; Inhale 2   puffs every 6 (six) hours as needed for wheezing   calcium carbonate-vitamin D 500 mg-5 mcg per tablet; Take 1 tablet by   mouth daily with breakfast   citalopram 40 mg tablet; Commonly known as: CeleXA; Take 1 tablet (40 mg   total) by mouth daily   DULoxetine 30 mg delayed release capsule; Commonly known as: Cymbalta;   Take 1 capsule (30 mg total) by mouth daily   furosemide 20 mg tablet; Commonly known as: LASIX; Take 1 tablet (20 mg   total) by mouth daily As needed   lidocaine 5 %; Commonly known as: Lidoderm; Apply 1 patch topically over   12 hours daily Remove & Discard patch within 12 hours or as directed by MD   losartan 100 MG tablet; Commonly known as: COZAAR; Take 1 tablet (100 mg   total) by mouth  daily   metoprolol tartrate 100 mg tablet; Commonly known as: LOPRESSOR; Take 1   tablet (100 mg total) by mouth 2 (two) times a day   pantoprazole 40 mg tablet; Commonly known as: PROTONIX; Take 1 tablet   (40 mg total) by mouth daily     STOP taking these medications     calcium carbonate-vitamin D 500 mg-5 mcg tablet   naproxen 500 mg tablet; Commonly known as: Naprosyn        Disposition:   Home with home PT     For Discharges to [facility]:   ·       Not Applicable to this Patient - Not Applicable to this Patient     Discharge Statement:  I spent 30 minutes discharging the patient. This time was spent on the day of discharge. I had direct contact with the patient on the day of discharge. Greater than 50% of the total time was spent examining patient, answering all patient questions, arranging and discussing plan of care with patient as well as directly providing post-discharge instructions.  Additional time then spent on discharge activities.     ** Please Note: This note has been constructed using a voice recognition system **     Zo Villalpando DO   08/24/24  1:54 PM

## 2024-08-24 NOTE — DISCHARGE SUMMARY
Discharge Summary - [facility], [facility]     Patient Information: Karin Nieves 74 y.o. female MRN: [id number]  Unit/Bed#: ICU 12 Encounter: [id number]     Admitting Physician: Yolande Sen MD  Discharging Physician/Practitioner: Yolande Sen MD   PCP: Debby Dawn MD  Admission Date:   Admission Orders (From admission, onward)       Ordered        08/23/24 1610  INPATIENT ADMISSION  Once            08/22/24 1628  Place in Observation  Once                          Discharge Date: 08/24/24      Reason for Admission: Abrasion [T14.8XXA]  Facial laceration [S01.81XA]  Bony abnormality [Q79.9]  Stroke-like symptoms [R29.90]  Pain [R52]  Facial trauma, initial encounter [S09.93XA]  Laceration of oral cavity, initial encounter [S01.512A]     Discharge Diagnoses:      Principal Problem:    Stroke-like symptoms  Active Problems:    Essential hypertension    Morbid obesity with BMI of 40.0-44.9, adult ([facility])    Anxiety disorder    Recurrent major depressive disorder, in full remission ([facility])    Pre-diabetes    H/O bariatric surgery - bypass    Gastroesophageal reflux disease  Resolved Problems:    * No resolved hospital problems. *       Consultations During Hospital Stay:  ·       ***     Procedures Performed:   ·       ***     Significant Findings / Test Results:   ·      ***     Incidental Findings:   ·       ***      Test Results Pending at Discharge (will require follow up):   ·       ***     Outpatient Tests Requested:  ·       ***     Outpatient follow-up Requested:  ·       ***    Complications:  ***    Things to address at first visit after hospitalization   ***    Hospital Course:   {THIS TEXT WILL DISAPEAR ONCE YOU SIGN THE NOTE, DO NOT FORGET TO COMPLETE THE HOSPITAL COURSE IN A PORCH FORMATE AS A SUMMARY FOR EACH PROBLEM (Optional):91423}   Karin Nieves is a 74 y.o. female patient who originally presented to the hospital on 8/22/2024 due to ***     Condition  "at Discharge: {condition:65769}      Discharge Day Visit / Exam:      Vitals: Blood Pressure: 160/73 (08/24/24 0540)  Pulse: 69 (08/24/24 0400)  Temperature: 98.8 °F (37.1 °C) (08/24/24 0400)  Temp Source: Tympanic (08/24/24 0400)  Respirations: 22 (08/24/24 0400)  Height: 5' 6\" (167.6 cm) (08/23/24 1330)  Weight - Scale: 118 kg (260 lb 2.3 oz) (08/24/24 0600)  SpO2: 95 % (08/24/24 0400)  Exam:   Physical Exam  Constitutional:       General: She is not in acute distress.     Appearance: Normal appearance. She is obese. She is not ill-appearing or toxic-appearing.   HENT:      Head: Normocephalic and atraumatic.   Neurological:      Mental Status: She is alert.       ***     Discussion with Family: ***  Patient Information Sharing: With the consent of Karin Nieves, their loved ones (insert name(s) here***) were notified today by inpatient team of the patient’s condition and current plan.  All questions answered. *** Karin Nieves does not wish inpatient team to notify their loved ones of their condition and current plan. ***     Discharge instructions/Information to patient and family:   See after visit summary for information provided to patient and family.       Discharge Medications:     Medication List      ASK your doctor about these medications     albuterol 90 mcg/act inhaler; Commonly known as: Proventil HFA; Inhale 2   puffs every 6 (six) hours as needed for wheezing   calcium carbonate-vitamin D 500 mg-5 mcg per tablet; Take 1 tablet by   mouth daily with breakfast   calcium carbonate-vitamin D 500 mg-5 mcg tablet; Take 1 tablet by mouth   once daily with breakfast   citalopram 40 mg tablet; Commonly known as: CeleXA; Take 1 tablet (40 mg   total) by mouth daily   DULoxetine 30 mg delayed release capsule; Commonly known as: Cymbalta;   Take 1 capsule (30 mg total) by mouth daily   furosemide 20 mg tablet; Commonly known as: LASIX; Take 1 tablet (20 mg   total) by mouth daily As needed   lidocaine 5 " %; Commonly known as: Lidoderm; Apply 1 patch topically over   12 hours daily Remove & Discard patch within 12 hours or as directed by MD   losartan 100 MG tablet; Commonly known as: COZAAR; Take 1 tablet (100 mg   total) by mouth daily   metoprolol tartrate 100 mg tablet; Commonly known as: LOPRESSOR; Take 1   tablet (100 mg total) by mouth 2 (two) times a day   naproxen 500 mg tablet; Commonly known as: Naprosyn; Take 1 tablet (500   mg total) by mouth 2 (two) times a day with meals   pantoprazole 40 mg tablet; Commonly known as: PROTONIX; Take 1 tablet   (40 mg total) by mouth daily        Disposition:   {Disposition at Discharge:82452}     For Discharges to [facility]:   ·       {[facility] Contact List:37719} - {[facility] Phone Call:24944}     Discharge Statement:  I spent *** minutes discharging the patient. This time was spent on the day of discharge. I had direct contact with the patient on the day of discharge. Greater than 50% of the total time was spent examining patient, answering all patient questions, arranging and discussing plan of care with patient as well as directly providing post-discharge instructions.  Additional time then spent on discharge activities.     ** Please Note: This note has been constructed using a voice recognition system **     Zo Villalpando DO   08/24/24  8:43 AM

## 2024-08-24 NOTE — UTILIZATION REVIEW
NOTIFICATION OF INPATIENT ADMISSION   AUTHORIZATION REQUEST   SERVICING FACILITY:   [place] [address]  Tax ID: [id number]  NPI: [id number] ATTENDING PROVIDER:  Attending Name and NPI#: Yolande Sen Md [[id number]]  Address: [address] [place] [address]  Phone: [phone]   ADMISSION INFORMATION:  Place of Service: Inpatient Acute [facility]  Place of Service Code: 21  Inpatient Admission Date/Time: 8/23/24  4:10 PM  Discharge Date/Time: No discharge date for patient encounter.  Admitting Diagnosis Code/Description:  Abrasion [T14.8XXA]  Facial laceration [S01.81XA]  Bony abnormality [Q79.9]  Stroke-like symptoms [R29.90]  Pain [R52]  Facial trauma, initial encounter [S09.93XA]  Laceration of oral cavity, initial encounter [S01.512A]     UTILIZATION REVIEW CONTACT:  Funmilayo Staples Utilization   Network Utilization Review Department  Phone: [phone]  Fax [phone]  Email: [email or web address]  Contact for approvals/pending authorizations, clinical reviews, and discharge.     PHYSICIAN ADVISORY SERVICES:  Medical Necessity Denial & Xoko-be-Gsgd Review  Phone: [phone]  Fax: [phone]  Email: [email or web address]     DISCHARGE SUPPORT TEAM:  For Patients Discharge Needs & Updates  Phone: [phone] opt. 2 Fax: [phone]  Email: [email or web address]

## 2024-08-24 NOTE — DISCHARGE INSTR - AVS FIRST PAGE
Please follow up with the neurologist within 8 - 10 weeks.   Please follow up with an ophthalmologist (eye doctor)   Please start taking Aspirin 81, Atorvastatin, and Nifedipine   Please follow up with the PCP w/in 14 days.

## 2024-08-26 ENCOUNTER — OFFICE VISIT (OUTPATIENT)
Age: 74
End: 2024-08-26

## 2024-08-26 ENCOUNTER — TRANSITIONAL CARE MANAGEMENT (OUTPATIENT)
Age: 74
End: 2024-08-26

## 2024-08-26 ENCOUNTER — TELEPHONE (OUTPATIENT)
Age: 74
End: 2024-08-26

## 2024-08-26 VITALS
HEIGHT: 66 IN | OXYGEN SATURATION: 97 % | SYSTOLIC BLOOD PRESSURE: 116 MMHG | WEIGHT: 265 LBS | BODY MASS INDEX: 42.59 KG/M2 | TEMPERATURE: 98 F | DIASTOLIC BLOOD PRESSURE: 84 MMHG | RESPIRATION RATE: 16 BRPM | HEART RATE: 60 BPM

## 2024-08-26 DIAGNOSIS — B35.4 TINEA CORPORIS: ICD-10-CM

## 2024-08-26 DIAGNOSIS — Z71.89 COMPLEX CARE COORDINATION: Primary | ICD-10-CM

## 2024-08-26 DIAGNOSIS — E66.01 MORBID OBESITY WITH BMI OF 40.0-44.9, ADULT (HCC): ICD-10-CM

## 2024-08-26 DIAGNOSIS — I10 ESSENTIAL HYPERTENSION: ICD-10-CM

## 2024-08-26 DIAGNOSIS — R29.90 STROKE-LIKE SYMPTOMS: ICD-10-CM

## 2024-08-26 DIAGNOSIS — Z76.89 ENCOUNTER FOR SUPPORT AND COORDINATION OF TRANSITION OF CARE: Primary | ICD-10-CM

## 2024-08-26 PROCEDURE — 99496 TRANSJ CARE MGMT HIGH F2F 7D: CPT | Performed by: FAMILY MEDICINE

## 2024-08-26 RX ORDER — CLOTRIMAZOLE 1 %
CREAM (GRAM) TOPICAL 2 TIMES DAILY
Qty: 14 G | Refills: 0 | Status: SHIPPED | OUTPATIENT
Start: 2024-08-26

## 2024-08-26 NOTE — UTILIZATION REVIEW
NOTIFICATION OF ADMISSION DISCHARGE   This is a Notification of Discharge from [facility]. Please be advised that this patient has been discharge from our facility. Below you will find the admission and discharge date and time including the patient’s disposition.   UTILIZATION REVIEW CONTACT:  Elsie Staples  Utilization   Network Utilization Review Department  Phone: [phone] x carefully listen to the prompts. All voicemails are confidential.  Email: [email or web address]     ADMISSION INFORMATION  PRESENTATION DATE: 8/22/2024 10:26 AM  OBERVATION ADMISSION DATE: 08/22/2024 1628  INPATIENT ADMISSION DATE: 8/23/24  4:10 PM   DISCHARGE DATE: 8/24/2024  3:37 PM   DISPOSITION:Home/Self Care    Network Utilization Review Department  ATTENTION: Please call with any questions or concerns to [phone] and carefully listen to the prompts so that you are directed to the right person. All voicemails are confidential.   For Discharge needs, contact Care Management DC Support Team at [phone] opt. 2  Send all requests for admission clinical reviews, approved or denied determinations and any other requests to dedicated fax number below belonging to the campus where the patient is receiving treatment. List of dedicated fax numbers for the Facilities:  FACILITY NAME UR FAX NUMBER   ADMISSION DENIALS (Administrative/Medical Necessity) [phone]   DISCHARGE SUPPORT TEAM ([facility]) [phone]   PARENT CHILD HEALTH (Maternity/NICU/Pediatrics) [phone]   [facility] [phone]   [facility] [phone]   [facility] [phone]   [facility] [phone]   [facility] [phone]   [facility] [phone]   [facility] [phone]   [facility]  [phone]   [facility] [phone]   [facility] [phone]   [facility] [phone]   [facility] [phone]

## 2024-08-26 NOTE — PROGRESS NOTES
Pt was seen in hospital consult by attending Neurology MD.  There is no new acute findings and pt was recommended to stay on long term aspirin therapy and statin.   Due to no acute findings, pt does not need OP Neurology follow. Recs to continue working with PCP in outpt setting in maintaining vascular risk goals for long term stroke management.

## 2024-08-26 NOTE — TELEPHONE ENCOUNTER
----- Message from Zo Villalpando DO sent at 8/24/2024  1:57 PM EDT -----  Regarding: TCM  Greetings,    Please make 14 day TCM for this patient.

## 2024-08-26 NOTE — PROGRESS NOTES
Transition of Care Visit  Name: Karin Nieves      : 1950      MRN: [id number]  Encounter Provider: Ahmet Mike MD  Encounter Date: 2024   Encounter department: [facility]    Assessment & Plan   1. Encounter for support and coordination of transition of care  2. Stroke-like symptoms  Assessment & Plan:  Discharged 2 days ago after brief hospitalization for strokelike symptoms, mainly vision changes and dizziness, and recent fall onto face and left arm.    Workup overall was negative.  CT head, CTA head and neck, MRI brain with no acute findings.  Chronic findings noted.  History of previous infarcts.    X-rays of left wrist and CT facial bones and c-spine negative for fractures    Today patient reports her main complaint is her vision issues.   - Ophthalmology referral was given on discharge and patient is currently waiting to hear back from [facility]    Plan:  - Continue ASA 81 mg daily, Lipitor 40 mg daily given previous hx of CVA  - Per last Neuro recommendation, no outpatient follow up with neuro needed  - Follow up with ophthalmology for vision issues  - Advised to use Voltaren for left wrist pain, likely inflammatory in nature as x-ray left wrist was negative.  Patient opted to obtain over the counter  - Continue BP control per HTN section  - Continue home PT  3. Essential hypertension  Assessment & Plan:  Chronic    - BP stable today 116/84  - No further dizziness or falls    Plan:  - Continue Nifedipine 30 mg daily  - Continue Losartan 100 mg daily, Metoprolol 100 mg BID, Lasix 20 mg daily PRN  - Follow up at annual physical  4. Tinea corporis  Assessment & Plan:  Acute    - Erythematous raised rash in circular pattern seen on left neck in 2 locations    Plan:  - Lotrimin cream given today, advised may take up to 4-6 weeks for resolution  - Advised to keep area clean, and warned about close contacts potential for spread  Orders:  -      clotrimazole (LOTRIMIN) 1 % cream; Apply topically 2 (two) times a day  5. Morbid obesity with BMI of 40.0-44.9, adult ([facility])  Assessment & Plan:  Continue home PT       History of Present Illness   {Disappearing Hyperlinks I Encounters * My Last Note * Since Last Visit * History :35735}  Transitional Care Management Review:   Karin Nieves is a 74 y.o. female here for TCM follow up.   Reports she is doing well. Still waiting to hear from ophthalmologist.  No further dizziness or falls.  Also reports her left wrist is still bothering her, no numbness or tingling.    During the TCM phone call patient stated:  TCM Call     Date and time call was made  8/26/2024  1:23 PM    Hospital care reviewed  Records reviewed    Patient was hospitialized at  [facility]    Date of Admission  08/22/24    Date of discharge  08/24/24    Diagnosis  Facial trauma    Disposition  Home    Current Symptoms  None      TCM Call     Post hospital issues  None    Should patient be enrolled in anticoag monitoring?  No    Scheduled for follow up?  Yes  TCM appt scheduled for Wednesday 8/28/24 @ 11:00 am    Patients specialists  Neurologist    Did you obtain your prescribed medications  Yes    Do you need help managing your prescriptions or medications  No    Is transportation to your appointment needed  Yes    Specify why  Pt was told not to drive by her doctors    I have advised the patient to call PCP with any new or worsening symptoms  Genesis Snyder RN    Living Arrangements  Family members    Support System  Family    The type of support provided  None; Emotional    Do you have social support  Yes, as much as I need    Are you recieving any outpatient services  No    Are you recieving home care services  No    Are you using any community resources  No    Current waiver services  No    Have you fallen in the last 12 months  Yes    Interperter language line needed  No    Counseling  Patient    Counseling topics  Importance of RX  "compliance; Activities of daily living    Comments  8/26/24 @ 7pm-TCM call completed        HPI  Review of Systems   Constitutional:  Negative for chills and fever.   HENT:  Negative for ear pain and sore throat.    Eyes:  Positive for visual disturbance.   Respiratory:  Negative for cough and shortness of breath.    Cardiovascular:  Negative for chest pain and palpitations.   Gastrointestinal:  Negative for abdominal pain and vomiting.   Genitourinary:  Negative for difficulty urinating and dysuria.   Musculoskeletal:  Negative for back pain.        Left wrist pain   Skin:  Positive for rash (on left neck). Negative for color change.   Psychiatric/Behavioral:  The patient is not nervous/anxious.    All other systems reviewed and are negative.    Objective   {Disappearing Hyperlinks   Review Vitals * Enter New Vitals * Results Review * Labs * Imaging * Cardiology * Procedures * Lung Cancer Screening :99257}  /84   Pulse 60   Temp 98 °F (36.7 °C) (Tympanic)   Resp 16   Ht 5' 6\" (1.676 m)   Wt 120 kg (265 lb)   SpO2 97%   BMI 42.77 kg/m²     Physical Exam  Vitals reviewed.   Constitutional:       General: She is not in acute distress.     Appearance: She is obese. She is not ill-appearing.   HENT:      Head: Normocephalic.      Comments: Raised erythematous rash in circular pattern on left neck, 2 lesions, appears to be consistent with tinea corporis     Right Ear: External ear normal.      Left Ear: External ear normal.      Mouth/Throat:      Mouth: Mucous membranes are moist.      Pharynx: No oropharyngeal exudate or posterior oropharyngeal erythema.   Eyes:      Extraocular Movements: Extraocular movements intact.      Conjunctiva/sclera: Conjunctivae normal.   Cardiovascular:      Rate and Rhythm: Normal rate and regular rhythm.      Pulses: Normal pulses.      Heart sounds: Normal heart sounds. No murmur heard.  Pulmonary:      Effort: No respiratory distress.      Breath sounds: Normal breath " sounds. No wheezing.   Abdominal:      General: There is no distension.      Palpations: Abdomen is soft.      Tenderness: There is no abdominal tenderness. There is no guarding.   Musculoskeletal:      Left wrist: Tenderness present. No swelling or deformity. Normal range of motion.      Right hand: Normal strength.      Left hand: Normal strength.   Skin:     General: Skin is warm and dry.   Neurological:      Mental Status: She is alert.   Psychiatric:         Mood and Affect: Mood normal.         Behavior: Behavior normal.       Medications have been reviewed by provider in current encounter

## 2024-08-27 PROBLEM — B35.4 TINEA CORPORIS: Status: ACTIVE | Noted: 2024-08-27

## 2024-08-27 NOTE — ASSESSMENT & PLAN NOTE
Acute    - Erythematous raised rash in circular pattern seen on left neck in 2 locations    Plan:  - Lotrimin cream given today, advised may take up to 4-6 weeks for resolution  - Advised to keep area clean, and warned about close contacts potential for spread

## 2024-08-27 NOTE — ASSESSMENT & PLAN NOTE
Chronic    - BP stable today 116/84  - No further dizziness or falls    Plan:  - Continue Nifedipine 30 mg daily  - Continue Losartan 100 mg daily, Metoprolol 100 mg BID, Lasix 20 mg daily PRN  - Follow up at annual physical

## 2024-08-27 NOTE — ASSESSMENT & PLAN NOTE
Discharged 2 days ago after brief hospitalization for strokelike symptoms, mainly vision changes and dizziness, and recent fall onto face and left arm.    Workup overall was negative.  CT head, CTA head and neck, MRI brain with no acute findings.  Chronic findings noted.  History of previous infarcts.    X-rays of left wrist and CT facial bones and c-spine negative for fractures    Today patient reports her main complaint is her vision issues.   - Ophthalmology referral was given on discharge and patient is currently waiting to hear back from [facility] eye specialist    Plan:  - Continue ASA 81 mg daily, Lipitor 40 mg daily given previous hx of CVA  - Per last Neuro recommendation, no outpatient follow up with neuro needed  - Follow up with ophthalmology for vision issues  - Advised to use Voltaren for left wrist pain, likely inflammatory in nature as x-ray left wrist was negative.  Patient opted to obtain over the counter  - Continue BP control per HTN section  - Continue home PT

## 2024-08-27 NOTE — UTILIZATION REVIEW
NOTIFICATION OF ADMISSION DISCHARGE   This is a Notification of Discharge from [facility]. Please be advised that this patient has been discharge from our facility. Below you will find the admission and discharge date and time including the patient’s disposition.   UTILIZATION REVIEW CONTACT:  Elsie Staples  Utilization   Network Utilization Review Department  Phone: [phone] x carefully listen to the prompts. All voicemails are confidential.  Email: [email or web address]     ADMISSION INFORMATION  PRESENTATION DATE: 8/22/2024 10:26 AM  OBERVATION ADMISSION DATE: 08/22/2024 1628  INPATIENT ADMISSION DATE: 8/23/24  4:10 PM   DISCHARGE DATE: 8/24/2024  3:37 PM   DISPOSITION:Home/Self Care    Network Utilization Review Department  ATTENTION: Please call with any questions or concerns to [phone] and carefully listen to the prompts so that you are directed to the right person. All voicemails are confidential.   For Discharge needs, contact Care Management DC Support Team at [phone] opt. 2  Send all requests for admission clinical reviews, approved or denied determinations and any other requests to dedicated fax number below belonging to the campus where the patient is receiving treatment. List of dedicated fax numbers for the Facilities:  FACILITY NAME UR FAX NUMBER   ADMISSION DENIALS (Administrative/Medical Necessity) [phone]   DISCHARGE SUPPORT TEAM ([facility]) [phone]   PARENT CHILD HEALTH (Maternity/NICU/Pediatrics) [phone]   [facility] [phone]   [facility] [phone]   [facility] [phone]   [facility] [phone]   Critical access hospital [phone]   [facility] [phone]   [facility] [phone]   [facility]  [phone]   [facility] [phone]   [facility] [phone]   [facility] [phone]   [facility] [phone]

## 2024-08-28 ENCOUNTER — PATIENT OUTREACH (OUTPATIENT)
Age: 74
End: 2024-08-28

## 2024-08-28 NOTE — PROGRESS NOTES
Referral received. Chart review completed for the following sections:  Recent Vital Signs  Allergies/Contradictions  Medication Review   History   [facility]   Problem List  Immunizations  Past hospitalizations and major procedures, including surgery  Significant past illnesses and treatment history including: History and Physical and Medications/Infusions/Transfusions  Relevant past medications related to the patient's condition        Outreach to patient. Discussed the following:       Have you had any more falls?  Has not had any falls. Has a cane and walker. Uses them as needed.     - Has your BP been elevated since leaving?  No it is good.     Any worsening vision problems?  Vision not getting worse but cannot see anything. Uses a magnifying glass to read medication bottles. Daughter provides assistance.     - How are your wrist and face healing?  Wrist hurts. Unable to open jars. Face is healing. Bone hurts from wrist to elbow.       Needs refill on Protonix In basket message sent to provider to order refill.     Pt states she is waiting for a return call from [facility]. She attempted multiple times.     Outreach to [facility]. Appointment scheduled for 9/11 at 1:40 pm at [address] [place] [address]. Other locations are not scheduling appointments until late October.     Outreach to patient. Advised of above. Agreeable to this date and time for eye appointment.     RN ELEAZAR will follow up with patient.

## 2024-08-29 ENCOUNTER — PATIENT OUTREACH (OUTPATIENT)
Age: 74
End: 2024-08-29

## 2024-08-29 DIAGNOSIS — D50.9 IRON DEFICIENCY ANEMIA, UNSPECIFIED IRON DEFICIENCY ANEMIA TYPE: ICD-10-CM
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
## 2024-08-29 DIAGNOSIS — Z87.11 HISTORY OF GASTRIC ULCER: ICD-10-CM

## 2024-08-29 DIAGNOSIS — K21.00 GASTROESOPHAGEAL REFLUX DISEASE WITH ESOPHAGITIS WITHOUT HEMORRHAGE: ICD-10-CM

## 2024-08-29 DIAGNOSIS — M25.532 LEFT WRIST PAIN: ICD-10-CM

## 2024-08-29 RX ORDER — PANTOPRAZOLE SODIUM 40 MG/1
40 TABLET, DELAYED RELEASE ORAL DAILY
Qty: 90 TABLET | Refills: 0 | Status: SHIPPED | OUTPATIENT
Start: 2024-08-29

## 2024-08-29 NOTE — PROGRESS NOTES
Received return in basket message from provider. Outreach to patient.     Advised patient that refill for Protonix has been sent. Per Dr. Dawn, advised patient to not wear a splint, and to follow up with orthopedics. Provided patient with contact number to schedule an appointment with orthopedics. Pt reports she will schedule appointment today.     RN CM will continue to follow.

## 2024-08-30 ENCOUNTER — TELEPHONE (OUTPATIENT)
Dept: OBGYN CLINIC | Facility: CLINIC | Age: 74
End: 2024-08-30

## 2024-08-30 NOTE — TELEPHONE ENCOUNTER
Mom's voicemail wasn't set up so talked to her daughter to please pass along that we are not par with [facility] Community Plan and we were cancelling the appt. She was going to pass on the information to her mom and let her know to contact her ins. To see who they can go to

## 2024-09-17 ENCOUNTER — PATIENT OUTREACH (OUTPATIENT)
Age: 74
End: 2024-09-17

## 2024-09-17 NOTE — PROGRESS NOTES
Pt due for outreach. Chart notes reviewed. Pt did not go to orthopedics because they are not within her [facility] Network.     Attempted to reach patient. Unable to leave  as mailbox was not set up. RN CM will attempt one more time to reach patient.

## 2024-09-18 ENCOUNTER — TELEPHONE (OUTPATIENT)
Age: 74
End: 2024-09-18

## 2024-09-18 DIAGNOSIS — F32.A ANXIETY AND DEPRESSION: ICD-10-CM

## 2024-09-18 DIAGNOSIS — G89.4 CHRONIC PAIN SYNDROME: Primary | ICD-10-CM

## 2024-09-18 DIAGNOSIS — I10 ESSENTIAL HYPERTENSION: ICD-10-CM

## 2024-09-18 DIAGNOSIS — F41.9 ANXIETY AND DEPRESSION: ICD-10-CM

## 2024-09-18 RX ORDER — CITALOPRAM HYDROBROMIDE 40 MG/1
40 TABLET ORAL DAILY
Qty: 90 TABLET | Refills: 0 | Status: SHIPPED | OUTPATIENT
Start: 2024-09-18

## 2024-09-18 RX ORDER — METOPROLOL TARTRATE 100 MG
100 TABLET ORAL 2 TIMES DAILY
Qty: 180 TABLET | Refills: 0 | Status: SHIPPED | OUTPATIENT
Start: 2024-09-18

## 2024-09-18 RX ORDER — CYCLOBENZAPRINE HCL 10 MG
10 TABLET ORAL
Qty: 30 TABLET | Refills: 2 | Status: SHIPPED | OUTPATIENT
Start: 2024-09-18

## 2024-09-18 NOTE — TELEPHONE ENCOUNTER
Vm on rx line:    Yes, this is a prescription refill needed for indeed Xavier Koch date of birth [birth year] need the cyclobenzaprine milligram at [facility] in [place] and [address] [place] [address] and if you have any questions, you can contact us at [phone] or [phone]. Thank you.    You received a voice mail from [employer] CALLER.      Dr. Dawn can you review? I do not see cyclobenzaprine on her current medication list?

## 2024-09-24 ENCOUNTER — PATIENT OUTREACH (OUTPATIENT)
Age: 74
End: 2024-09-24

## 2024-09-24 NOTE — PROGRESS NOTES
Outreach to patient. Wrist is feeling better. Does not hurt as much anymore.     Pt was unable to find an orthopedic provider. Advised patient to call member services on the back of her insurance cards. Pt states she will call the insurance company.     Reviewed upcoming appointments.     Pt denies any additional needs. Complex episode closed at this time. Will reopen if new referral is received.

## 2024-10-01 DIAGNOSIS — I10 ESSENTIAL HYPERTENSION: ICD-10-CM

## 2024-10-01 DIAGNOSIS — R29.90 STROKE-LIKE SYMPTOMS: ICD-10-CM

## 2024-10-01 RX ORDER — ATORVASTATIN CALCIUM 40 MG/1
40 TABLET, FILM COATED ORAL EVERY EVENING
Qty: 30 TABLET | Refills: 0 | Status: SHIPPED | OUTPATIENT
Start: 2024-10-01

## 2024-10-01 RX ORDER — NIFEDIPINE 30 MG/1
30 TABLET, EXTENDED RELEASE ORAL DAILY
Qty: 30 TABLET | Refills: 0 | Status: SHIPPED | OUTPATIENT
Start: 2024-10-01

## 2024-10-01 NOTE — TELEPHONE ENCOUNTER
Vm on rx line:    Hi, yes I'm calling for Karin Sanchez birthday of 1950. She was given medication while at the hospital and she needs refills for them. It's nifedipine osmotic 30 milligram tabs, 1 tablet by mouth once daily and she also needs a refill for atorvastatin 40 milligram tab 1 tablet by mouth once daily in the evening. These were prescribed by Zo Reynoso at [facility]. We use [facility] and the telephone number to get back to us is [phone]. Thank you.    You received a voice mail from [employer].

## 2024-11-01 DIAGNOSIS — I10 ESSENTIAL HYPERTENSION: ICD-10-CM

## 2024-11-01 DIAGNOSIS — R29.90 STROKE-LIKE SYMPTOMS: ICD-10-CM

## 2024-11-04 RX ORDER — ATORVASTATIN CALCIUM 40 MG/1
40 TABLET, FILM COATED ORAL EVERY EVENING
Qty: 30 TABLET | Refills: 5 | Status: SHIPPED | OUTPATIENT
Start: 2024-11-04

## 2024-11-04 RX ORDER — NIFEDIPINE 30 MG/1
30 TABLET, EXTENDED RELEASE ORAL DAILY
Qty: 30 TABLET | Refills: 2 | Status: SHIPPED | OUTPATIENT
Start: 2024-11-04

## 2024-11-19 ENCOUNTER — OFFICE VISIT (OUTPATIENT)
Age: 74
End: 2024-11-19

## 2024-11-19 VITALS
OXYGEN SATURATION: 99 % | BODY MASS INDEX: 40.78 KG/M2 | WEIGHT: 259.8 LBS | SYSTOLIC BLOOD PRESSURE: 136 MMHG | HEIGHT: 67 IN | HEART RATE: 61 BPM | RESPIRATION RATE: 19 BRPM | TEMPERATURE: 98 F | DIASTOLIC BLOOD PRESSURE: 84 MMHG

## 2024-11-19 DIAGNOSIS — G89.4 CHRONIC PAIN SYNDROME: ICD-10-CM

## 2024-11-19 DIAGNOSIS — Z91.81 AT RISK FOR FALLS: ICD-10-CM

## 2024-11-19 DIAGNOSIS — R29.90 STROKE-LIKE SYMPTOMS: ICD-10-CM

## 2024-11-19 DIAGNOSIS — Z59.71 INSURANCE COVERAGE PROBLEMS: ICD-10-CM

## 2024-11-19 DIAGNOSIS — R73.03 PRE-DIABETES: ICD-10-CM

## 2024-11-19 DIAGNOSIS — Z23 ENCOUNTER FOR IMMUNIZATION: ICD-10-CM

## 2024-11-19 DIAGNOSIS — G47.9 SLEEP DIFFICULTIES: ICD-10-CM

## 2024-11-19 DIAGNOSIS — I10 ESSENTIAL HYPERTENSION: ICD-10-CM

## 2024-11-19 DIAGNOSIS — M79.89 LEG SWELLING: ICD-10-CM

## 2024-11-19 DIAGNOSIS — I63.40 CEREBROVASCULAR ACCIDENT (CVA) DUE TO EMBOLISM OF CEREBRAL ARTERY (HCC): ICD-10-CM

## 2024-11-19 DIAGNOSIS — H57.9 EYE DISORDER: ICD-10-CM

## 2024-11-19 DIAGNOSIS — K21.00 GASTROESOPHAGEAL REFLUX DISEASE WITH ESOPHAGITIS WITHOUT HEMORRHAGE: ICD-10-CM

## 2024-11-19 DIAGNOSIS — I50.32 CHRONIC DIASTOLIC (CONGESTIVE) HEART FAILURE (HCC): ICD-10-CM

## 2024-11-19 DIAGNOSIS — M20.002: Primary | ICD-10-CM

## 2024-11-19 DIAGNOSIS — Z12.31 ENCOUNTER FOR SCREENING MAMMOGRAM FOR BREAST CANCER: ICD-10-CM

## 2024-11-19 DIAGNOSIS — I50.32 CHRONIC DIASTOLIC CHF (CONGESTIVE HEART FAILURE) (HCC): ICD-10-CM

## 2024-11-19 PROBLEM — Z98.84 H/O BARIATRIC SURGERY: Status: RESOLVED | Noted: 2017-06-06 | Resolved: 2024-11-19

## 2024-11-19 PROBLEM — Z02.71 ENCOUNTER FOR DISABILITY EXAMINATION: Status: RESOLVED | Noted: 2024-03-28 | Resolved: 2024-11-19

## 2024-11-19 PROBLEM — Z87.11 HISTORY OF GASTRIC ULCER: Status: RESOLVED | Noted: 2023-08-02 | Resolved: 2024-11-19

## 2024-11-19 PROBLEM — M54.50 LOW BACK PAIN: Status: RESOLVED | Noted: 2017-07-13 | Resolved: 2024-11-19

## 2024-11-19 PROBLEM — B35.4 TINEA CORPORIS: Status: RESOLVED | Noted: 2024-08-27 | Resolved: 2024-11-19

## 2024-11-19 PROCEDURE — G2211 COMPLEX E/M VISIT ADD ON: HCPCS | Performed by: FAMILY MEDICINE

## 2024-11-19 PROCEDURE — 99214 OFFICE O/P EST MOD 30 MIN: CPT | Performed by: FAMILY MEDICINE

## 2024-11-19 RX ORDER — METOPROLOL TARTRATE 100 MG/1
100 TABLET ORAL 2 TIMES DAILY
Qty: 180 TABLET | Refills: 0 | Status: SHIPPED | OUTPATIENT
Start: 2024-11-19

## 2024-11-19 RX ORDER — PANTOPRAZOLE SODIUM 40 MG/1
40 TABLET, DELAYED RELEASE ORAL DAILY
Qty: 90 TABLET | Refills: 0 | Status: CANCELLED | OUTPATIENT
Start: 2024-11-19

## 2024-11-19 RX ORDER — LOSARTAN POTASSIUM 100 MG/1
100 TABLET ORAL DAILY
Qty: 90 TABLET | Refills: 1 | Status: SHIPPED | OUTPATIENT
Start: 2024-11-19

## 2024-11-19 RX ORDER — FAMOTIDINE 40 MG/1
40 TABLET, FILM COATED ORAL
Qty: 90 TABLET | Refills: 1 | Status: SHIPPED | OUTPATIENT
Start: 2024-11-19 | End: 2025-11-14

## 2024-11-19 RX ORDER — NIFEDIPINE 30 MG/1
30 TABLET, EXTENDED RELEASE ORAL DAILY
Qty: 30 TABLET | Refills: 2 | Status: SHIPPED | OUTPATIENT
Start: 2024-11-19

## 2024-11-19 RX ORDER — FUROSEMIDE 20 MG/1
20 TABLET ORAL DAILY
Qty: 90 TABLET | Refills: 0 | Status: SHIPPED | OUTPATIENT
Start: 2024-11-19

## 2024-11-19 RX ORDER — ATORVASTATIN CALCIUM 40 MG/1
40 TABLET, FILM COATED ORAL EVERY EVENING
Qty: 30 TABLET | Refills: 5 | Status: SHIPPED | OUTPATIENT
Start: 2024-11-19

## 2024-11-19 RX ORDER — CYCLOBENZAPRINE HCL 10 MG
10 TABLET ORAL
Qty: 30 TABLET | Refills: 2 | Status: SHIPPED | OUTPATIENT
Start: 2024-11-19 | End: 2024-11-19

## 2024-11-19 RX ORDER — DULOXETIN HYDROCHLORIDE 30 MG/1
30 CAPSULE, DELAYED RELEASE ORAL DAILY
Qty: 90 CAPSULE | Refills: 1 | Status: SHIPPED | OUTPATIENT
Start: 2024-11-19

## 2024-11-19 NOTE — ASSESSMENT & PLAN NOTE
Continue aspirin & antihypertensive medications as well as statin          
Goal is < 150/90, today it is 136/84  Home medications include: 620 mg daily, losartan 100 mg daily, Lopressor 100 mg twice daily, and nifedipine 30 mg daily  Patient admits to be compliant with medications.  Patient's blood pressure is controlled.   Patient denies any side effects with medications.     - Patient educated on the importance of weight loss, and appropriate dieting (low salt).   - Continue current medications     Orders:    metoprolol tartrate (LOPRESSOR) 100 mg tablet; Take 1 tablet (100 mg total) by mouth 2 (two) times a day    losartan (COZAAR) 100 MG tablet; Take 1 tablet (100 mg total) by mouth daily    NIFEdipine (PROCARDIA XL) 30 mg 24 hr tablet; Take 1 tablet (30 mg total) by mouth daily    
Last A1c was 5.9 on 8/6/24.     Diet and exercise counseling.   Repeat in 6 months outpatient, 2/2025         
PPI for side effect profile of risk for osteoporosis and long-term use, will start Pepcid 40 mg daily instead  Orders:    famotidine (PEPCID) 40 MG tablet; Take 1 tablet (40 mg total) by mouth daily at bedtime    
Wt Readings from Last 3 Encounters:   11/19/24 118 kg (259 lb 12.8 oz)   08/26/24 120 kg (265 lb)   08/24/24 118 kg (260 lb 2.3 oz)     No signs of fluid overload, continue current medications.               
Yes

## 2024-11-19 NOTE — PATIENT INSTRUCTIONS
Patient Education     Preventing falls in adults   The Basics   Written by the doctors and editors at [facility]   Am I at risk of falling? -- Falls can happen to anyone, no matter how old they are. Several things can increase your risk of a fall, including:   Vision problems   Having certain chronic health conditions, being sick, having recently been in the hospital, or having had surgery   Changing the medicines you take   An unsafe or unfamiliar setting (for example, a room with rugs or furniture that might trip you, or an area you don't know well)  Your risk of falling increases as you grow older. That's because getting older can make it harder to walk steadily and keep your balance. Also, the effects of falls are more serious for older people.  Overall, 3 to 4 out of every 10 people over the age of 65 fall each year. Many people who fracture a hip never fully recover to how they were before the fracture. If you have fallen in the past, you are at higher risk of falling again.  How can my doctor help me avoid falling? -- Your doctor can talk to you about the following things:   Past falls - It is important to tell your doctor about any times that you have fallen or almost fallen. They can then suggest ways to prevent another fall.   Your health conditions - Some health problems can put you at risk of falling. These include conditions that affect eyesight, hearing, muscle strength, or balance.   What to do if you are in the hospital - Falls can happen in the hospital because you are in an unfamiliar place. You might feel unsteady or drowsy from medicines or anesthesia. Or you might be attached to catheters or IV tubing that you could trip over. You are also likely to be weaker than usual.   Your medicines - Certain medicines can increase the risk of falling. These include some medicines for sleeping problems, anxiety, high blood pressure, or depression. Adding new medicines, or changing doses of some medicines, can  also affect your risk of falling.  The more your doctor knows about your situation, the better they can help you. For example, if you fell because you have a condition that causes pain, your doctor might suggest treatments to help with the pain. Or if any of your medicines are making you dizzy and more likely to fall, your doctor might switch you to a different medicine.  Is there anything I can do on my own? -- Yes. To help keep from falling, you can:   Make your home safer - To avoid falling at home, get rid of things that might make you trip or slip. This can include furniture, electrical cords, clutter, and loose rugs (figure 1). Keep your home well lit so you can easily see where you are going. Avoid storing things in high places so you don't have to reach or climb.   Wear non-slip socks or sturdy shoes that fit well - Wearing shoes with high heels or slippery soles, or shoes that are too loose, can lead to falls. Walking around in bare feet, or only socks, can also increase your risk of falling.   Take vitamin D pills - Taking vitamin D might lower the risk of falls in older people. This is because vitamin D helps make bones and muscles stronger. Your doctor can talk to you about whether you should take extra vitamin D, and how much.   Stay active - Moving your body regularly can help lower your risk of falling. It might also help prevent you from getting hurt if you do fall. It is best to do a few different activities that help with both strength and balance. There are many kinds of exercise that can be safe for older people. These include walking, swimming, and nelly chi (a Chinese martial art involving slow, gentle movements).   Use a cane, walker, or other safety devices - If your doctor recommends that you use a cane or walker, make sure that it's the right size and you know how to use it. There are other devices that might help you avoid falling, too. These include grab bars or a sturdy seat for the  shower, non-slip bath mats, and hand rails or treads for the stairs (to prevent slipping).   Take extra care if you have had surgery or are in the hospital - Ask for help with getting out of bed, getting up from a chair, or going to the bathroom. Your body needs time to heal, and it's normal to need help with these things while you recover. It can also help to keep your belongings within reach, and avoid walking around in the dark. If you need help, use the call button instead of trying to get up.  If you worry that you could fall, you can get an emergency alert system. This is usually an alarm button that lets you call for help if you fall and can't get up. If you live alone and you do not have an emergency alert system, always carry a cell phone or portable phone with you when moving around the house.  How do I get up from a fall? -- If you do fall, try not to be afraid. Stay calm, and take slow, deep breaths. If someone is near you, call for help right away. If you have an emergency alert system, use it.  Try to find out if you are hurt. If you are hurt badly, trying to get up can make things worse. If you do not think that you are hurt badly, come up with a plan to get up off of the floor.  Some tips to get up after a fall if you are alone:   Look around you for a piece of sturdy furniture such as a couch or chair. Try to move your body to the furniture. You might need to scoot, crawl, or roll to get close. Do these movements very slowly. If you feel any sharp pains, you might need to stop.   Once you are close to the furniture, roll onto your side. Pull your knees up toward your chest, and try to get on your hands and knees.   Put your hands on the seat of the couch or chair.   Bring 1 leg forward so the foot is flat on the floor. If you have a stronger leg, move this leg first.   Now, try to stand up. Once both feet are on the ground, slowly turn and lower yourself to sit down on the seat.  What should I do  after a fall? -- If you had a fall, see your doctor right away, even if you aren't hurt. Your doctor can try to figure out what caused you to fall, and how likely you are to fall again. They will do an exam and talk to you about your health problems, medicines, and activities. They can also check how well you walk, move, and balance. Then, they can suggest things you can do to lower your risk of falling again.  Many older people have a hard time recovering after a fall. Doing things to prevent falling can help you protect your health and independence.  All topics are updated as new evidence becomes available and our peer review process is complete.  This topic retrieved from [employer] on: Apr 04, 2024.  Topic 62472 Version 25.0  Release: 32.2.4 - C32.93  © 2024 UpToDate, Inc. and/or its affiliates. All rights reserved.  figure 1: How to avoid falling at home     This picture shows some of the things that can cause a fall in your home. Look around and remove any loose rugs, electrical cords, clutter, or furniture that could trip you.  Graphic 54932 Version 1.0  Consumer Information Use and Disclaimer   Disclaimer: This generalized information is a limited summary of diagnosis, treatment, and/or medication information. It is not meant to be comprehensive and should be used as a tool to help the user understand and/or assess potential diagnostic and treatment options. It does NOT include all information about conditions, treatments, medications, side effects, or risks that may apply to a specific patient. It is not intended to be medical advice or a substitute for the medical advice, diagnosis, or treatment of a health care provider based on the health care provider's examination and assessment of a patient's specific and unique circumstances. Patients must speak with a health care provider for complete information about their health, medical questions, and treatment options, including any risks or benefits regarding  use of medications. This information does not endorse any treatments or medications as safe, effective, or approved for treating a specific patient. UpToDate, Inc. and its affiliates disclaim any warranty or liability relating to this information or the use thereof.The use of this information is governed by the Terms of Use, available at https://www.[employer].com/en/know/clinical-effectiveness-terms. 2024© UpToDate, Inc. and its affiliates and/or licensors. All rights reserved.  Copyright   © 2024 UpToDate, Inc. and/or its affiliates. All rights reserved.

## 2024-11-19 NOTE — PROGRESS NOTES
Name: Karin Nieves      : 1950      MRN: [id number]  Encounter Provider: Debby Dawn MD  Encounter Date: 2024   Encounter department: [facility]  :  Assessment & Plan  Encounter for immunization  Patient received flu and COVID-vaccine at her local pharmacy.  Provided with paper copy, will put in folder to scanned to chart.       Gastroesophageal reflux disease with esophagitis without hemorrhage  PPI for side effect profile of risk for osteoporosis and long-term use, will start Pepcid 40 mg daily instead  Orders:    famotidine (PEPCID) 40 MG tablet; Take 1 tablet (40 mg total) by mouth daily at bedtime    Essential hypertension  Goal is < 150/90, today it is 136/84  Home medications include: 620 mg daily, losartan 100 mg daily, Lopressor 100 mg twice daily, and nifedipine 30 mg daily  Patient admits to be compliant with medications.  Patient's blood pressure is controlled.   Patient denies any side effects with medications.     - Patient educated on the importance of weight loss, and appropriate dieting (low salt).   - Continue current medications     Orders:    metoprolol tartrate (LOPRESSOR) 100 mg tablet; Take 1 tablet (100 mg total) by mouth 2 (two) times a day    losartan (COZAAR) 100 MG tablet; Take 1 tablet (100 mg total) by mouth daily    NIFEdipine (PROCARDIA XL) 30 mg 24 hr tablet; Take 1 tablet (30 mg total) by mouth daily    Insurance coverage problems  Previous ophthalmologist no longer takes her insurance, was receiving injections in her eye.  Due for next injection at the end of the month.  Does not have an ophthalmologist.  He needs help finding ophthalmologist that takes her insurance, as well as finding a hand surgeon (previous referral did not take her insurance), as she has had difficulty with an injury of her left finger for quite some time now.  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 Declined treatment at that time. Has grown significant =ly and now impedes function. Referral to hand surgeon in 8/24 however was not covered by insurance and patient did not follow up. Now open to get imaging first, with xray. Declined PT/OT at this time. Also ask that SW helps to find hand surgeon covered by patients insurance.   Orders:    XR hand 3+ vw left; Future    At risk for falls  Discussed stopping ppi to reduce risk of osteoporosis  Discussed stopping flexeril to reduce risk of falls  Uses cane to ambulate        Chronic diastolic CHF (congestive heart failure) (HCC)  Wt Readings from Last 3 Encounters:   11/19/24 118 kg (259 lb 12.8 oz)   08/26/24 120 kg (265 lb)   08/24/24 118 kg (260 lb 2.3 oz)     No signs of fluid overload, continue current medications.               Cerebrovascular accident (CVA) due to embolism of cerebral artery (HCC)  Continue aspirin & antihypertensive medications as well as statin          Pre-diabetes  Last A1c was 5.9 on 8/6/24.     Diet and exercise counseling.   Repeat in 6 months outpatient, 2/2025         Eye disorder  Followed with ophthalmology for eye injections.  When asked if it was for macular degeneration, patient and her daughter states it was because she had blood behind her eyes.  No diagnosis in relation to her eyes on epic other than blurred vision from 8/24 and referral sent to ophthalmology.  Asked if they could get the specific diagnosis from the ophthalmologist and let us know. Last injection 10/22/24, meant to be every 29 days. No longer accepts her insurance.  Having difficulty with insurance coverage for current ophthalmology, placed that referral to social work to help with insurance questions to find  adequate coverage ASAP.       Sleep difficulties  To her chronic back pain with spinal stenosis, was taking Flexeril to help with muscle spasm, and also with her sleep.  Advised to stop taking this medication due to side effect profile and her age.  Patient agreeable to starting melatonin, as well as Tylenol at bedtime.              History of Present Illness         This is a very pleasant 74 year old female who presents here today with her daughter Kary. She has a few concerns she would like to discuss today.     She has a growth on finger of left had which is uncomfortable, mostly when trying to  her coffee mug. She has tried breaking it open on her own but was educated on risks associated with that and advised to avoid this, she would like removal. We referred her to a hand surgeon, however this was not covered by her insurance.  Will have social work see what we can do to find someone who is covered by her insurance.    Expresses concern with her vision, as she started to get injections with the ophthalmologist, however they recently found out they no longer take her insurance.    Daughter reports recently had a fall out of a motorized scooter when turning.  Denied any loss of consciousness or major injuries.      Review of Systems   Constitutional:  Positive for fatigue. Negative for appetite change, chills and fever.   HENT:  Negative for congestion and postnasal drip.    Eyes:  Positive for visual disturbance. Negative for pain, discharge and redness.   Respiratory:  Negative for cough, choking, chest tightness and shortness of breath.    Cardiovascular:  Positive for leg swelling (chronic). Negative for chest pain and palpitations.   Gastrointestinal:  Negative for abdominal pain, constipation, diarrhea and vomiting.   Endocrine: Negative for polyuria.   Musculoskeletal:  Negative for arthralgias.   Skin:  Negative for rash.   Neurological:  Negative for dizziness, syncope, weakness,  "light-headedness and headaches.   Psychiatric/Behavioral:  Positive for sleep disturbance. Negative for dysphoric mood, self-injury and suicidal ideas. The patient is not nervous/anxious.           Objective   /84 (BP Location: Left arm, Patient Position: Sitting, Cuff Size: Large)   Pulse 61   Temp 98 °F (36.7 °C) (Axillary)   Resp 19   Ht 5' 6.5\" (1.689 m)   Wt 118 kg (259 lb 12.8 oz)   SpO2 99%   BMI 41.30 kg/m²      Physical Exam  Vitals and nursing note reviewed.   Constitutional:       General: She is not in acute distress.     Appearance: Normal appearance. She is well-developed.   HENT:      Head: Normocephalic and atraumatic.      Right Ear: External ear normal.      Left Ear: External ear normal.      Nose: No congestion.      Mouth/Throat:      Mouth: Mucous membranes are moist.      Pharynx: Oropharynx is clear.   Eyes:      Extraocular Movements: Extraocular movements intact.      Conjunctiva/sclera: Conjunctivae normal.      Comments: Eye glasses   Cardiovascular:      Rate and Rhythm: Normal rate and regular rhythm.      Pulses: Normal pulses.      Heart sounds: Normal heart sounds. No murmur heard.  Pulmonary:      Effort: Pulmonary effort is normal. No respiratory distress.      Breath sounds: Normal breath sounds. No wheezing or rales.   Abdominal:      General: There is no distension.   Musculoskeletal:         General: Normal range of motion.      Right hand: Normal.      Left hand: Deformity (swelling on dorsal aspect of index finger) present. No lacerations, tenderness or bony tenderness. Normal range of motion. Normal strength. Normal sensation.      Cervical back: Normal range of motion and neck supple.      Right lower leg: No edema.      Left lower leg: No edema.   Skin:     General: Skin is warm and dry.      Capillary Refill: Capillary refill takes less than 2 seconds.   Neurological:      General: No focal deficit present.      Mental Status: She is alert and oriented to " person, place, and time.      Cranial Nerves: Cranial nerves 2-12 are intact.      Sensory: Sensation is intact.      Motor: Motor function is intact.      Coordination: Coordination is intact.      Gait: Gait abnormal (ambulates with small based pronged cane).   Psychiatric:         Mood and Affect: Mood normal.         Behavior: Behavior normal.         Debby Dawn MD   11/19/24  11:58 AM        Falls Plan of Care: Balance, strength, and gait training instructions were provided.

## 2024-12-04 ENCOUNTER — HOSPITAL ENCOUNTER (OUTPATIENT)
Dept: RADIOLOGY | Facility: HOSPITAL | Age: 74
Discharge: HOME | End: 2024-12-04
Payer: COMMERCIAL

## 2024-12-04 DIAGNOSIS — M20.002: ICD-10-CM

## 2024-12-04 PROCEDURE — 73130 X-RAY EXAM OF HAND: CPT

## 2024-12-06 ENCOUNTER — RESULTS FOLLOW-UP (OUTPATIENT)
Age: 74
End: 2024-12-06

## 2024-12-13 DIAGNOSIS — F41.9 ANXIETY AND DEPRESSION: ICD-10-CM

## 2024-12-13 DIAGNOSIS — F32.A ANXIETY AND DEPRESSION: ICD-10-CM

## 2024-12-13 RX ORDER — CITALOPRAM HYDROBROMIDE 40 MG/1
40 TABLET ORAL DAILY
Qty: 90 TABLET | Refills: 0 | Status: SHIPPED | OUTPATIENT
Start: 2024-12-13

## 2024-12-14 DIAGNOSIS — Z87.11 HISTORY OF GASTRIC ULCER: ICD-10-CM

## 2024-12-14 DIAGNOSIS — D50.9 IRON DEFICIENCY ANEMIA, UNSPECIFIED IRON DEFICIENCY ANEMIA TYPE: ICD-10-CM

## 2024-12-16 RX ORDER — PANTOPRAZOLE SODIUM 40 MG/1
40 TABLET, DELAYED RELEASE ORAL DAILY
Qty: 30 TABLET | Refills: 0 | Status: SHIPPED | OUTPATIENT
Start: 2024-12-16

## 2024-12-19 ENCOUNTER — PATIENT OUTREACH (OUTPATIENT)
Age: 74
End: 2024-12-19

## 2024-12-19 NOTE — PROGRESS NOTES
"SWCM received referral from provider to assist patient with insurance coverage problems.     RINKUCM completed chart review. Per chart, patient's previous ophthalmologist no longer takes her insurance, was receiving injections in her eye.  Patient is due for next injection at the end of the month and does not have an ophthalmologist. Patient \" needs help finding ophthalmologist that takes her insurance, as well as finding a hand surgeon (previous referral did not take her insurance), as she has had difficulty with an injury of her left finger for quite some time now.\"    SW called patient to introduce self and offer support. SWCM unable to reach patient. SWCM unable to leave message as patient's voicemail has not been set up yet.    [facility] will attempt outreach again and remain available as further needs arise.      "

## 2024-12-26 ENCOUNTER — PATIENT OUTREACH (OUTPATIENT)
Age: 74
End: 2024-12-26

## 2024-12-26 NOTE — PROGRESS NOTES
"RINKU BUSH called patient to follow up and assist with needs. RINKU BUSH unable to reach patient (x2). RINKU BUSH unable to leave voicemail as mailbox has not been set up yet. RINKU BUSH sent UTR letter. RINKU BUSH closed case and removed self from care team.    If patient returns call and patient is still in need of connection to speciality providers, RINKU BUSH will refer patient to \"[email or web address]\" for a directory of in-network providers as listed on the back of her insurance card. RINKU BUSH verified that there is a comprehensive list of local ophthalmologist that are in-network with patient's insurance.     RINKU BUSH will remain available to assist as needed.        "

## 2025-01-12 DIAGNOSIS — D50.9 IRON DEFICIENCY ANEMIA, UNSPECIFIED IRON DEFICIENCY ANEMIA TYPE: ICD-10-CM

## 2025-01-12 DIAGNOSIS — Z87.11 HISTORY OF GASTRIC ULCER: ICD-10-CM

## 2025-01-13 RX ORDER — PANTOPRAZOLE SODIUM 40 MG/1
40 TABLET, DELAYED RELEASE ORAL DAILY
Qty: 30 TABLET | Refills: 0 | Status: SHIPPED | OUTPATIENT
Start: 2025-01-13

## 2025-01-16 ENCOUNTER — OFFICE VISIT (OUTPATIENT)
Dept: INTERNAL MEDICINE CLINIC | Facility: CLINIC | Age: 75
End: 2025-01-16
Payer: COMMERCIAL

## 2025-01-16 VITALS
HEIGHT: 67 IN | RESPIRATION RATE: 16 BRPM | OXYGEN SATURATION: 97 % | WEIGHT: 255 LBS | HEART RATE: 58 BPM | SYSTOLIC BLOOD PRESSURE: 150 MMHG | TEMPERATURE: 97.6 F | DIASTOLIC BLOOD PRESSURE: 90 MMHG | BODY MASS INDEX: 40.02 KG/M2

## 2025-01-16 DIAGNOSIS — E55.9 VITAMIN D DEFICIENCY: ICD-10-CM

## 2025-01-16 DIAGNOSIS — I63.40 CEREBROVASCULAR ACCIDENT (CVA) DUE TO EMBOLISM OF CEREBRAL ARTERY (HCC): ICD-10-CM

## 2025-01-16 DIAGNOSIS — R73.03 PRE-DIABETES: ICD-10-CM

## 2025-01-16 DIAGNOSIS — M62.81 ABNORMAL GAIT DUE TO MUSCLE WEAKNESS: ICD-10-CM

## 2025-01-16 DIAGNOSIS — Z98.84 HISTORY OF GASTRIC BYPASS: ICD-10-CM

## 2025-01-16 DIAGNOSIS — E78.2 MIXED HYPERLIPIDEMIA: ICD-10-CM

## 2025-01-16 DIAGNOSIS — M54.16 LUMBAR RADICULOPATHY: ICD-10-CM

## 2025-01-16 DIAGNOSIS — R26.9 ABNORMAL GAIT DUE TO MUSCLE WEAKNESS: ICD-10-CM

## 2025-01-16 DIAGNOSIS — E66.01 MORBID OBESITY WITH BMI OF 40.0-44.9, ADULT (HCC): ICD-10-CM

## 2025-01-16 DIAGNOSIS — Z13.0 SCREENING FOR DEFICIENCY ANEMIA: ICD-10-CM

## 2025-01-16 DIAGNOSIS — R29.6 FALLS: ICD-10-CM

## 2025-01-16 DIAGNOSIS — I50.32 CHRONIC DIASTOLIC CHF (CONGESTIVE HEART FAILURE) (HCC): Primary | ICD-10-CM

## 2025-01-16 DIAGNOSIS — R26.89 BALANCE PROBLEM: ICD-10-CM

## 2025-01-16 PROCEDURE — 99214 OFFICE O/P EST MOD 30 MIN: CPT | Performed by: INTERNAL MEDICINE

## 2025-01-16 RX ORDER — IBUPROFEN 600 MG/1
600 TABLET, FILM COATED ORAL EVERY 8 HOURS PRN
Qty: 30 TABLET | Refills: 0 | Status: SHIPPED | OUTPATIENT
Start: 2025-01-16

## 2025-01-16 RX ORDER — CYCLOBENZAPRINE HCL 5 MG
5 TABLET ORAL
Qty: 30 TABLET | Refills: 2 | Status: SHIPPED | OUTPATIENT
Start: 2025-01-16

## 2025-01-16 NOTE — ASSESSMENT & PLAN NOTE
Previous A1c reviewed borderline    Vies diet exercise diabetic diet    Repeat A1c before next visit  Counseling done to lose weight same as under morbid obesity  Orders:  •  Comprehensive metabolic panel; Future  •  Hemoglobin A1C; Future  •  Albumin / creatinine urine ratio; Future  •  Urinalysis with microscopic; Future

## 2025-01-16 NOTE — ASSESSMENT & PLAN NOTE
Claims fell twice last 1 months with minimal injury the last fall 9 days ago at home when trying to walk in the dark without walker usually able to ambulate with walker for precaution instruction given to the patient refer to balance center and physical therapy for improved gait denies any dizziness and orthostatic hypotension  Orders:  •  Ambulatory Referral to Physical Therapy; Future  •  Ambulatory referral to Physical Therapy; Future

## 2025-01-16 NOTE — ASSESSMENT & PLAN NOTE
History of gastric bypass will check CBC CMP calcium iron vitamin D B12 B1 and folic acid  Orders:  •  CBC and differential; Future  •  Comprehensive metabolic panel; Future

## 2025-01-16 NOTE — ASSESSMENT & PLAN NOTE
Lab Results   Component Value Date    LDLCALC 112 (H) 08/23/2024     Lab Results   Component Value Date    ALT 24 08/22/2024    AST 31 08/22/2024    ALKPHOS 107 (H) 08/22/2024       Agree continue manage medication as follow    Above reviewed    Lipitor 40 mg daily    Check LFT lipid profile before next visit no side effects if needed will up the doses after the next lipid profile  Orders:  •  Lipid Panel with Direct LDL reflex; Future

## 2025-01-16 NOTE — ASSESSMENT & PLAN NOTE
BMI 40.54 associated with prediabetes hypertension congestive heart failure    Advise cut down the calorie portion diet and exercise change in lifestyle counseling done as follows    BMI Counseling:      The BMI is above normal. Know Body weight goal    Weigh yourself daily or weekly as per your doctor    Nutrition recommendations include decreasing portion sizes, encouraging healthy choices of fruits and vegetables, decreasing     fast food intake, consuming healthier snacks, limiting drinks that contain sugar, moderation in carbohydrate intake, increasing     intake of lean protein, reducing intake of saturated and trans fat and reducing intake of cholesterol  Discussed options of HealthyCORE-Intensive Lifestyle Intervention Program, Very Low Calorie Diet-VLCD and Conservative Program and the role of weight loss medications.  - Explained the importance of making lifestyle changes in addition to starting anti-obesity medications.   -    Orders:  •  Comprehensive metabolic panel; Future

## 2025-01-16 NOTE — ASSESSMENT & PLAN NOTE
Wt Readings from Last 3 Encounters:   01/16/25 116 kg (255 lb)   11/19/24 118 kg (259 lb 12.8 oz)   08/26/24 120 kg (265 lb)   Patient euvolemic CHF compensated continue low-salt diet fluid restriction Daily weight monitoring agree and continue main medication follow neck Lasix 20 mg daily    Follow-up with cardiology    Check BMP          Orders:  •  Comprehensive metabolic panel; Future

## 2025-01-16 NOTE — ASSESSMENT & PLAN NOTE
No new neurodeficit agree and continue manage medication as follow Lipitor 40 mg daily    Aspirin 81 mg daily    Follow-up with the neurology  Previous MRI reviewed shows multiple CVA  Orders:  •  Comprehensive metabolic panel; Future

## 2025-01-16 NOTE — PATIENT INSTRUCTIONS
Patient Education     Preventing Falls ED   General Information   You came to the Emergency Department (ED) and the doctors are concerned about your risk for falling. You may be at a higher risk of falling based on your age or your illness. Some medicines make your risk of falling higher, as does adding new medicines or being in an area that is not familiar.  What care is needed at home?   Call your regular doctor to let them know you were in the ED. Make a follow-up appointment if you were told to. Ask your doctor if you need to take vitamin D to help keep your bones strong.  Make your home safer. Get rid of things that might make you trip or slip. These are things like loose rugs, electrical cords, or clutter. Add grab bars, a shower seat, and handrails.  Wear sturdy shoes that fit well. Shoes should fit well, have a low heel, and the soles of the shoe should not be slippery. Walking in socks or with bare feet can raise your chance for falling.  Stay active. Walk, garden, swim, or do something active on a regular basis. These activities may prevent you from getting hurt if you do fall. They also help with your strength and balance.  Use a cane, walker, or other safety device. Be sure it is the right size for you and that you know how to use it safely. Be sure to wear your eyeglasses if they have been ordered for you.  Get up slowly after you sit or lie down. Try to change positions slowly.  When do I need to get emergency help?   Call an ambulance right away if:   You are hurt or cannot get up on your own.  You are not able to walk on your own.  You fall and hit your head and are on blood thinners.  You have severe neck pain after a fall.  You cannot stop any bleeding with 5 minutes of direct pressure.  You lose consciousness after a fall.  You have clear fluid coming from your ears, nose, or mouth.  Return to the ED if:   You have severe pain.  You have a cut that may need stitches.  You have headaches,  dizziness, confusion, or ringing in your ears after your fall.  When do I need to call the doctor?   You notice a lack of strength or trouble standing up.  You are worried you may fall again.  You are dizzy or feel unsteady when you walk.  You have new or worsening symptoms.  Last Reviewed Date   2021-05-05  Consumer Information Use and Disclaimer   This generalized information is a limited summary of diagnosis, treatment, and/or medication information. It is not meant to be comprehensive and should be used as a tool to help the user understand and/or assess potential diagnostic and treatment options. It does NOT include all information about conditions, treatments, medications, side effects, or risks that may apply to a specific patient. It is not intended to be medical advice or a substitute for the medical advice, diagnosis, or treatment of a health care provider based on the health care provider's examination and assessment of a patient’s specific and unique circumstances. Patients must speak with a health care provider for complete information about their health, medical questions, and treatment options, including any risks or benefits regarding use of medications. This information does not endorse any treatments or medications as safe, effective, or approved for treating a specific patient. [employer] and its affiliates disclaim any warranty or liability relating to this information or the use thereof. The use of this information is governed by the Terms of Use, available at https://www.[employer].com/en/know/clinical-effectiveness-terms   Copyright   Copyright © 2024 [employer] and its affiliates and/or licensors. All rights reserved.

## 2025-01-16 NOTE — ASSESSMENT & PLAN NOTE
Patient is able to ambulate with a walker still fear of falling due to the unable to keep the balance as per patient fell twice at home while walking with minimal or no injury    Start physical therapy refer to balance center    No orthostatic hypotension  Orders:  •  Ambulatory Referral to Physical Therapy; Future  •  Ambulatory referral to Physical Therapy; Future

## 2025-01-16 NOTE — ASSESSMENT & PLAN NOTE
History of for lower back pain seen by pain management in the past treated with epidural injection now since the last fall complaining of pain lower back getting worse using over-the-counter Tylenol improvement pain rating both lower extremity able to ambulate with a walker with some tingling numbness and a fear of falling    Will treat as follows    Awaiting to be seen by spine pain management    Ibuprofen 600 mg 3 times a day as needed for pain    Flexeril 5 mg at bedtime patient claims Flexeril was helping for the muscle spasm and sleep at night    Physical therapy  Orders:  •  Ambulatory Referral to Physical Therapy; Future  •  Ambulatory referral to Physical Therapy; Future  •  cyclobenzaprine (FLEXERIL) 5 mg tablet; Take 1 tablet (5 mg total) by mouth daily at bedtime  •  ibuprofen (MOTRIN) 600 mg tablet; Take 1 tablet (600 mg total) by mouth every 8 (eight) hours as needed for mild pain

## 2025-01-16 NOTE — ASSESSMENT & PLAN NOTE
Fell twice in the last 1 month with no major injury patient seen first time after more than 2 years    To ambulate with walker    Refer to balance center denies any dizziness for now no orthostatic hypotension  Orders:  •  Ambulatory Referral to Physical Therapy; Future  •  Ambulatory referral to Physical Therapy; Future

## 2025-01-16 NOTE — PROGRESS NOTES
Name: Karin Nieves      : 1950      MRN: [id number]  Encounter Provider: Radha Estrada MD  Encounter Date: 2025   Encounter department: [facility]  :  Assessment & Plan  Screening for deficiency anemia    Orders:  •  CBC and differential; Future    Cerebrovascular accident (CVA) due to embolism of cerebral artery (HCC)  No new neurodeficit agree and continue manage medication as follow Lipitor 40 mg daily    Aspirin 81 mg daily    Follow-up with the neurology  Previous MRI reviewed shows multiple CVA  Orders:  •  Comprehensive metabolic panel; Future    Morbid obesity with BMI of 40.0-44.9, adult ([facility])  BMI 40.54 associated with prediabetes hypertension congestive heart failure    Advise cut down the calorie portion diet and exercise change in lifestyle counseling done as follows    BMI Counseling:      The BMI is above normal. Know Body weight goal    Weigh yourself daily or weekly as per your doctor    Nutrition recommendations include decreasing portion sizes, encouraging healthy choices of fruits and vegetables, decreasing     fast food intake, consuming healthier snacks, limiting drinks that contain sugar, moderation in carbohydrate intake, increasing     intake of lean protein, reducing intake of saturated and trans fat and reducing intake of cholesterol  Discussed options of HealthyCORE-Intensive Lifestyle Intervention Program, Very Low Calorie Diet-VLCD and Conservative Program and the role of weight loss medications.  - Explained the importance of making lifestyle changes in addition to starting anti-obesity medications.   -    Orders:  •  Comprehensive metabolic panel; Future    Pre-diabetes  Previous A1c reviewed borderline    Vies diet exercise diabetic diet    Repeat A1c before next visit  Counseling done to lose weight same as under morbid obesity  Orders:  •  Comprehensive metabolic panel; Future  •  Hemoglobin A1C; Future  •  Albumin / creatinine  urine ratio; Future  •  Urinalysis with microscopic; Future    Chronic diastolic CHF (congestive heart failure) (HCC)  Wt Readings from Last 3 Encounters:   01/16/25 116 kg (255 lb)   11/19/24 118 kg (259 lb 12.8 oz)   08/26/24 120 kg (265 lb)   Patient euvolemic CHF compensated continue low-salt diet fluid restriction Daily weight monitoring agree and continue main medication follow neck Lasix 20 mg daily    Follow-up with cardiology    Check BMP          Orders:  •  Comprehensive metabolic panel; Future    History of gastric bypass  History of gastric bypass will check CBC CMP calcium iron vitamin D B12 B1 and folic acid  Orders:  •  CBC and differential; Future  •  Comprehensive metabolic panel; Future    Mixed hyperlipidemia  Lab Results   Component Value Date    LDLCALC 112 (H) 08/23/2024     Lab Results   Component Value Date    ALT 24 08/22/2024    AST 31 08/22/2024    ALKPHOS 107 (H) 08/22/2024       Agree continue manage medication as follow    Above reviewed    Lipitor 40 mg daily    Check LFT lipid profile before next visit no side effects if needed will up the doses after the next lipid profile  Orders:  •  Lipid Panel with Direct LDL reflex; Future    Vitamin D deficiency         Falls  Claims fell twice last 1 months with minimal injury the last fall 9 days ago at home when trying to walk in the dark without walker usually able to ambulate with walker for precaution instruction given to the patient refer to balance center and physical therapy for improved gait denies any dizziness and orthostatic hypotension  Orders:  •  Ambulatory Referral to Physical Therapy; Future  •  Ambulatory referral to Physical Therapy; Future    Balance problem  Fell twice in the last 1 month with no major injury patient seen first time after more than 2 years    To ambulate with walker    Refer to balance center denies any dizziness for now no orthostatic hypotension  Orders:  •  Ambulatory Referral to Physical Therapy;  Future  •  Ambulatory referral to Physical Therapy; Future    Abnormal gait due to muscle weakness  Patient is able to ambulate with a walker still fear of falling due to the unable to keep the balance as per patient fell twice at home while walking with minimal or no injury    Start physical therapy refer to balance center    No orthostatic hypotension  Orders:  •  Ambulatory Referral to Physical Therapy; Future  •  Ambulatory referral to Physical Therapy; Future    Lumbar radiculopathy  History of for lower back pain seen by pain management in the past treated with epidural injection now since the last fall complaining of pain lower back getting worse using over-the-counter Tylenol improvement pain rating both lower extremity able to ambulate with a walker with some tingling numbness and a fear of falling    Will treat as follows    Awaiting to be seen by spine pain management    Ibuprofen 600 mg 3 times a day as needed for pain    Flexeril 5 mg at bedtime patient claims Flexeril was helping for the muscle spasm and sleep at night    Physical therapy  Orders:  •  Ambulatory Referral to Physical Therapy; Future  •  Ambulatory referral to Physical Therapy; Future  •  cyclobenzaprine (FLEXERIL) 5 mg tablet; Take 1 tablet (5 mg total) by mouth daily at bedtime  •  ibuprofen (MOTRIN) 600 mg tablet; Take 1 tablet (600 mg total) by mouth every 8 (eight) hours as needed for mild pain          Depression Screening and Follow-up Plan: Patient's depression screening was positive with a PHQ-9 score of 7.   Patient advised to follow-up with PCP for further management.     History of Present Illness     M in follow-up multiple medical problems patient seen about 2 years ago since then multiple falls CVA previous record reviewed for now patient has a fear of falling fell twice at home able to ambulate ambulate with a walker at the time patient felt was trying to walk without a walker denies any dizziness no chest pain no  difficulty breathing awaiting to be seen by ophthalmology previous labs reviewed new set of labs was given to the patient and complaining of back pain rating both lower extremity with tingling numbness causing difficulty walking for details refer to assessment plan visit diagnosis    Review of Systems   Constitutional:  Positive for activity change and fatigue. Negative for appetite change, chills, diaphoresis, fever and unexpected weight change.   HENT:  Negative for congestion, dental problem, drooling, ear discharge, ear pain, facial swelling, hearing loss, mouth sores, nosebleeds, postnasal drip, rhinorrhea, sinus pressure, sneezing, sore throat, tinnitus, trouble swallowing and voice change.    Eyes:  Negative for photophobia, pain, discharge, redness, itching and visual disturbance.   Respiratory:  Negative for apnea, cough, choking, chest tightness, shortness of breath, wheezing and stridor.    Cardiovascular:  Negative for chest pain, palpitations and leg swelling.   Gastrointestinal:  Negative for abdominal distention, abdominal pain, anal bleeding, blood in stool, constipation, diarrhea, nausea, rectal pain and vomiting.   Endocrine: Negative for cold intolerance, heat intolerance, polydipsia, polyphagia and polyuria.   Genitourinary:  Negative for decreased urine volume, difficulty urinating, dysuria, enuresis, flank pain, frequency, genital sores, hematuria and urgency.   Musculoskeletal:  Positive for arthralgias, back pain, gait problem, joint swelling and myalgias. Negative for neck pain and neck stiffness.   Skin:  Negative for color change, pallor, rash and wound.   Allergic/Immunologic: Negative.  Negative for environmental allergies, food allergies and immunocompromised state.   Neurological:  Negative for dizziness, tremors, seizures, syncope, facial asymmetry, speech difficulty, weakness, light-headedness, numbness and headaches.   Psychiatric/Behavioral:  Negative for agitation, behavioral  "problems, confusion, decreased concentration, dysphoric mood, hallucinations, self-injury, sleep disturbance and suicidal ideas. The patient is nervous/anxious. The patient is not hyperactive.        Objective   /90   Pulse 58   Temp 97.6 °F (36.4 °C)   Resp 16   Ht 5' 6.5\" (1.689 m)   Wt 116 kg (255 lb)   SpO2 97%   BMI 40.54 kg/m²      Physical Exam  Vitals and nursing note reviewed.   Constitutional:       General: She is not in acute distress.     Appearance: She is well-developed. She is obese. She is not ill-appearing, toxic-appearing or diaphoretic.   HENT:      Head: Normocephalic and atraumatic.      Right Ear: External ear normal.      Left Ear: External ear normal.      Nose: Nose normal.      Mouth/Throat:      Pharynx: No oropharyngeal exudate.   Eyes:      General: Lids are normal. Lids are everted, no foreign bodies appreciated. No scleral icterus.        Right eye: No discharge.         Left eye: No discharge.      Conjunctiva/sclera: Conjunctivae normal.      Pupils: Pupils are equal, round, and reactive to light.   Neck:      Thyroid: No thyromegaly.      Vascular: Normal carotid pulses. No carotid bruit, hepatojugular reflux or JVD.      Trachea: No tracheal tenderness or tracheal deviation.   Cardiovascular:      Rate and Rhythm: Normal rate and regular rhythm.      Pulses: Normal pulses.      Heart sounds: Murmur heard.      No friction rub. No gallop.   Pulmonary:      Effort: Pulmonary effort is normal. No respiratory distress.      Breath sounds: Normal breath sounds. No stridor. No wheezing or rales.   Chest:      Chest wall: No tenderness.   Abdominal:      General: Bowel sounds are normal. There is no distension.      Palpations: Abdomen is soft. There is no mass.      Tenderness: There is no abdominal tenderness. There is no guarding or rebound.   Musculoskeletal:         General: No tenderness or deformity. Normal range of motion.      Cervical back: Normal range of motion " and neck supple. No edema, erythema or rigidity. No spinous process tenderness or muscular tenderness. Normal range of motion.   Lymphadenopathy:      Head:      Right side of head: No submental, submandibular, tonsillar, preauricular or posterior auricular adenopathy.      Left side of head: No submental, submandibular, tonsillar, preauricular, posterior auricular or occipital adenopathy.      Cervical: No cervical adenopathy.      Right cervical: No superficial, deep or posterior cervical adenopathy.     Left cervical: No superficial, deep or posterior cervical adenopathy.      Upper Body:      Right upper body: No pectoral adenopathy.      Left upper body: No pectoral adenopathy.   Skin:     General: Skin is warm and dry.      Coloration: Skin is not pale.      Findings: No erythema or rash.   Neurological:      General: No focal deficit present.      Mental Status: She is alert and oriented to person, place, and time.      Cranial Nerves: No cranial nerve deficit.      Sensory: No sensory deficit.      Motor: No tremor, abnormal muscle tone or seizure activity.      Coordination: Coordination normal.      Gait: Gait abnormal.      Deep Tendon Reflexes: Reflexes are normal and symmetric. Reflexes normal.   Psychiatric:         Behavior: Behavior normal.         Thought Content: Thought content normal.         Judgment: Judgment normal.

## 2025-01-18 ENCOUNTER — APPOINTMENT (EMERGENCY)
Dept: RADIOLOGY | Facility: HOSPITAL | Age: 75
End: 2025-01-18
Payer: COMMERCIAL

## 2025-01-18 ENCOUNTER — HOSPITAL ENCOUNTER (EMERGENCY)
Facility: HOSPITAL | Age: 75
Discharge: HOME | End: 2025-01-18
Attending: EMERGENCY MEDICINE
Payer: COMMERCIAL

## 2025-01-18 VITALS
SYSTOLIC BLOOD PRESSURE: 175 MMHG | OXYGEN SATURATION: 97 % | RESPIRATION RATE: 20 BRPM | DIASTOLIC BLOOD PRESSURE: 98 MMHG | TEMPERATURE: 97.9 F | HEART RATE: 65 BPM

## 2025-01-18 DIAGNOSIS — M54.9 BACK PAIN: ICD-10-CM

## 2025-01-18 DIAGNOSIS — R91.1 PULMONARY NODULE: ICD-10-CM

## 2025-01-18 DIAGNOSIS — D35.00 ADRENAL ADENOMA: ICD-10-CM
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
## 2025-01-18 DIAGNOSIS — K59.00 CONSTIPATION: ICD-10-CM

## 2025-01-18 PROCEDURE — 74176 CT ABD & PELVIS W/O CONTRAST: CPT

## 2025-01-18 PROCEDURE — 99284 EMERGENCY DEPT VISIT MOD MDM: CPT | Performed by: PHYSICIAN ASSISTANT

## 2025-01-18 PROCEDURE — 99284 EMERGENCY DEPT VISIT MOD MDM: CPT

## 2025-01-18 PROCEDURE — 70450 CT HEAD/BRAIN W/O DYE: CPT

## 2025-01-18 RX ORDER — LIDOCAINE 50 MG/G
1 PATCH TOPICAL ONCE
Status: DISCONTINUED | OUTPATIENT
Start: 2025-01-18 | End: 2025-01-18 | Stop reason: HOSPADM

## 2025-01-18 RX ORDER — ACETAMINOPHEN 325 MG/1
650 TABLET ORAL ONCE
Status: COMPLETED | OUTPATIENT
Start: 2025-01-18 | End: 2025-01-18

## 2025-01-18 RX ORDER — POLYETHYLENE GLYCOL 3350 17 G/17G
17 POWDER, FOR SOLUTION ORAL
Qty: 24480 G | Refills: 0 | Status: SHIPPED | OUTPATIENT
Start: 2025-01-18

## 2025-01-18 RX ADMIN — ACETAMINOPHEN 650 MG: 325 TABLET ORAL at 16:29

## 2025-01-18 RX ADMIN — LIDOCAINE 1 PATCH: 50 PATCH CUTANEOUS at 16:30

## 2025-01-18 NOTE — ED PROVIDER NOTES
Time reflects when diagnosis was documented in both MDM as applicable and the Disposition within this note       Time User Action Codes Description Comment    1/18/2025  5:20 PM Lex Chany Add [W19.XXXA] Fall from standing, initial encounter     1/18/2025  5:20 PM Dustin Lupe Add [M54.9] Back pain     1/18/2025  5:20 PM Lupe Chan Add [K59.00] Constipation     1/18/2025  5:23 PM Lupe Chan Add [R91.1] Pulmonary nodule     1/18/2025  5:24 PM Lupe Chan Add [D35.00] Adrenal adenoma           ED Disposition       ED Disposition   Discharge    Condition   Stable    Date/Time   Sat Jan 18, 2025  5:20 PM    Comment   Karin Nieves discharge to home/self care.                   Assessment & Plan       Medical Decision Making  Patient appears well no distress, thorough discussion with patient family regarding imaging studies.  Patient does have lumbosacral tenderness, no signs of acute osseous abnormality as reported on CT scan.  She does show signs of constipation, has ongoing history of incontinence.  Bladder scan shows 9 mL.  Will treat constipation with MiraLAX as patient has failed other over-the-counter means.  Taking Tylenol and topicals for pain.  Advised that she follow-up with her PCP regarding incidental findings as well as the balance center given her repetitive falls.  Discussed incidental findings including pulmonary nodules and left-sided adrenal adenoma.  Advised to avoid taking muscle relaxants or anything that may make her sleepy and at risk for further falls including muscle relaxants.  This discussion was witnessed in front of family members as well as Alexa ALEXIS.  Patient was given her previous physical therapy referral and highly encouraged she continue to do so.    Patient is informed to return to the emergency department for worsening of symptoms and was given proper education regarding their diagnosis and symptoms. Otherwise the patient is informed to  follow up with their primary care doctor and balance center and PT for re-evaluation. The patient verbalizes understanding and agrees with above assessment and plan. All questions were answered.          Amount and/or Complexity of Data Reviewed  Radiology: ordered.    Risk  OTC drugs.  Prescription drug management.        ED Course as of 01/18/25 1727   Sat Jan 18, 2025   1703 Bladder scan is 9 mL       Medications   lidocaine (LIDODERM) 5 % patch 1 patch (1 patch Topical Medication Applied 1/18/25 1630)   acetaminophen (TYLENOL) tablet 650 mg (650 mg Oral Given 1/18/25 1629)       ED Risk Strat Scores                                              History of Present Illness       Chief Complaint   Patient presents with    Fall     Fall a week ago when she fell on her butt after losing balance. Saw  two days ago and informed him. He wanted her to go to therapy for balance. Since fall having trouble urinating and moving bowels       Past Medical History:   Diagnosis Date    Anemia     Anxiety disorder     Arthritis     Blood disorder     Bronchitis     Chronic pain     Depression     Dizziness     GERD (gastroesophageal reflux disease)     History of gastric ulcer 08/02/2023    History of transfusion     Hypertension     Kidney stone 09/03/2022    Kidney stones     Lightheadedness     Obesity     SOB (shortness of breath)     Stroke (HCC)     Stroke-like symptoms 08/22/2024      Past Surgical History:   Procedure Laterality Date    ABDOMINAL SURGERY      APPENDECTOMY      CHOLECYSTECTOMY      EGD AND COLONOSCOPY N/A 07/11/2016    Procedure: EGD AND COLONOSCOPY;  Surgeon: Yousif Lord MD;  Location: [facility] GI LAB;  Service:     EPIDURAL BLOCK INJECTION Right 07/13/2017    Procedure: BLOCK / INJECTION EPIDURAL STEROID TRANSFORAMINAL  L4-5;  Surgeon: Surendra Siu MD;  Location: [facility] MAIN OR;  Service: Pain Management     EPIDURAL BLOCK INJECTION Right 08/09/2017    Procedure: BLOCK / INJECTION EPIDURAL STEROID  TRANSFORAMINAL L4-5- REPEAT;  Surgeon: Surendra Siu MD;  Location: [facility] MAIN OR;  Service: Pain Management     EPIDURAL BLOCK INJECTION Left 02/16/2018    Procedure: LEFT L4 AND L5 TRANSFORAMINAL EPIDURAL STEROID INJECTION;  Surgeon: Surendra Siu MD;  Location: [facility] MAIN OR;  Service: Pain Management     EYE SURGERY      GASTRIC BYPASS      MAMMO (HISTORICAL) Bilateral 05/06/2021    MA CYSTO/URETERO W/LITHOTRIPSY &INDWELL STENT INSRT Left 07/13/2016    Procedure: CYSTOSCOPY URETEROSCOPY WITH LITHOTRIPSY HOLMIUM LASER, RETROGRADE PYELOGRAM AND INSERTION STENT URETERAL;  Surgeon: Etienne Freeman MD;  Location: [facility] MAIN OR;  Service: Urology    TONSILLECTOMY        Family History   Problem Relation Age of Onset    No Known Problems Maternal Aunt     No Known Problems Maternal Aunt     No Known Problems Paternal Aunt     No Known Problems Paternal Aunt     No Known Problems Paternal Aunt     No Known Problems Daughter     No Known Problems Daughter     Diabetes Family     Heart disease Family         coronary arteriosclerosis      Social History     Tobacco Use    Smoking status: Never    Smokeless tobacco: Never   Vaping Use    Vaping status: Never Used   Substance Use Topics    Alcohol use: Never    Drug use: No      E-Cigarette/Vaping    E-Cigarette Use Never User       E-Cigarette/Vaping Substances    Nicotine No     THC No     CBD No     Flavoring No     Other No     Unknown No       I have reviewed and agree with the history as documented.     74-year-old female presenting today with a fall that occurred 9 days ago, relays that she was attempting to go to the bathroom when she went to open up the door and lost balance falling directly onto her buttock onto concrete floor.  She did not hit her head and is consciousness.  Since that point has had persistent pain   That is nonradiating.  Family is here who relays that patient has had multiple falls over the past year with another fall back in October where  she struck her head along the temporal region however never had a official evaluation.  Relays that she has now had constipation over the past week however has been passing small a lot of bowel movements every day as well as gas, taking Dulcolax and suppositories.  Does not typically have an issue with constipation however has had pain when sitting on the toilet and has not been as mobile over the past week.  Relays that she also has a history of incontinence which seems to have worsened over the past few days, she does not have any numbness or paresthesias of the bilateral lower extremities or saddle anesthesias.  Denies chest or abdominal pain, headache, new changes in vision, fecal incontinence etc. Differential includes but is not limited to fracture, UTI, constipation etc.        Review of Systems   Constitutional: Negative.  Negative for chills, fever and unexpected weight change.        Denies IV drug use     HENT: Negative.     Eyes: Negative.    Respiratory: Negative.  Negative for cough, chest tightness, shortness of breath and wheezing.    Cardiovascular: Negative.  Negative for chest pain and palpitations.   Gastrointestinal:  Positive for constipation. Negative for abdominal distention, abdominal pain, anal bleeding, blood in stool, diarrhea, nausea and vomiting.   Genitourinary: Negative.  Negative for difficulty urinating, dysuria, flank pain, frequency, hematuria and urgency.        Denies numbness, tingling in the groin.    Musculoskeletal:  Positive for back pain. Negative for arthralgias, gait problem, joint swelling, myalgias, neck pain and neck stiffness.   Skin: Negative.  Negative for color change.   Neurological: Negative.  Negative for dizziness, tremors, weakness, light-headedness, numbness and headaches.   All other systems reviewed and are negative.          Objective       ED Triage Vitals [01/18/25 1515]   Temperature Pulse Blood Pressure Respirations SpO2 Patient Position - Orthostatic  VS   98.1 °F (36.7 °C) 60 (!) 194/108 22 97 % Sitting      Temp Source Heart Rate Source BP Location FiO2 (%) Pain Score    Tympanic Monitor Right arm -- 5      Vitals      Date and Time Temp Pulse SpO2 Resp BP Pain Score FACES Pain Rating User   01/18/25 1629 -- -- -- -- -- 10 - Worst Possible Pain -- CS   01/18/25 1515 98.1 °F (36.7 °C) 60 97 % 22 194/108 5 -- LS            Physical Exam  Vitals and nursing note reviewed.   Constitutional:       General: She is not in acute distress.     Appearance: Normal appearance. She is well-developed and normal weight. She is not diaphoretic.   HENT:      Head: Normocephalic and atraumatic.      Right Ear: External ear normal.      Left Ear: External ear normal.      Nose: Nose normal.      Mouth/Throat:      Pharynx: No oropharyngeal exudate.   Eyes:      General: No scleral icterus.        Right eye: No discharge.         Left eye: No discharge.      Conjunctiva/sclera: Conjunctivae normal.      Pupils: Pupils are equal, round, and reactive to light.   Cardiovascular:      Rate and Rhythm: Normal rate and regular rhythm.      Pulses: Normal pulses.      Heart sounds: Normal heart sounds. No murmur heard.     No friction rub. No gallop.   Pulmonary:      Effort: Pulmonary effort is normal. No respiratory distress.      Breath sounds: Normal breath sounds. No stridor. No wheezing, rhonchi or rales.   Chest:      Chest wall: No tenderness.   Abdominal:      General: Bowel sounds are normal. There is no distension.      Palpations: Abdomen is soft. There is no mass.      Tenderness: There is no abdominal tenderness. There is no right CVA tenderness, left CVA tenderness, guarding or rebound.      Hernia: No hernia is present.   Musculoskeletal:        Arms:       Cervical back: Normal range of motion and neck supple.   Lymphadenopathy:      Cervical: No cervical adenopathy.   Skin:     General: Skin is warm and dry.      Capillary Refill: Capillary refill takes less than 2  seconds.      Coloration: Skin is not pale.      Findings: No erythema or rash.   Neurological:      General: No focal deficit present.      Mental Status: She is alert and oriented to person, place, and time. Mental status is at baseline.   Psychiatric:         Thought Content: Thought content normal.         Judgment: Judgment normal.         Results Reviewed       None            CT abdomen pelvis wo contrast   Final Interpretation by Neida Tomlin MD (01/18 1652)         1. Moderate amount of stool throughout the colon without obstruction.   2. No acute posttraumatic injury visualized.         3. Multiple stable incidental findings described in the body of the report.   4. Left adrenal adenoma.   Benign 3 cm left adrenal nodule, consistent with an adrenal adenoma. Consider biochemical assays to determine functional status. Recommend no further imaging evaluation.   Onofre RAYGOZA, et al. Management of Incidental Adrenal Masses: A White Paper of the ACR Incidental Findings Committee. J Am Margaux Radiol. 2017 Aug;14(8):6742-0713.      Workstation performed: [id number]         CT head without contrast   Final Interpretation by Neida Tomlin MD (01/18 1637)      No acute intracranial abnormality.  Stable chronic microangiopathic changes within the brain.   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                  Workstation performed: [id number]             Procedures    ED Medication and Procedure Management   Prior to Admission Medications   Prescriptions Last Dose Informant Patient Reported? Taking?   DULoxetine (Cymbalta) 30 mg delayed release capsule  Self No No   Sig: Take 1 capsule (30 mg total) by mouth daily   NIFEdipine (PROCARDIA XL) 30 mg 24 hr tablet  Self No No   Sig: Take 1 tablet (30 mg total) by mouth daily   albuterol (Proventil HFA) 90 mcg/act inhaler  Self No No   Sig: Inhale 2 puffs every 6 (six) hours as needed for wheezing   aspirin 81 mg chewable tablet  Self No No   Sig:  Chew 1 tablet (81 mg total) daily   atorvastatin (LIPITOR) 40 mg tablet  Self No No   Sig: Take 1 tablet (40 mg total) by mouth every evening   calcium carbonate-vitamin D 500 mg-5 mcg per tablet  Self No No   Sig: Take 1 tablet by mouth daily with breakfast   citalopram (CeleXA) 40 mg tablet  Self No No   Sig: Take 1 tablet by mouth once daily   clotrimazole (LOTRIMIN) 1 % cream  Self No No   Sig: Apply topically 2 (two) times a day   cyclobenzaprine (FLEXERIL) 5 mg tablet   No No   Sig: Take 1 tablet (5 mg total) by mouth daily at bedtime   famotidine (PEPCID) 40 MG tablet  Self No No   Sig: Take 1 tablet (40 mg total) by mouth daily at bedtime   furosemide (LASIX) 20 mg tablet  Self No No   Sig: TAKE 1 TABLET BY MOUTH ONCE DAILY AS NEEDED   ibuprofen (MOTRIN) 600 mg tablet   No No   Sig: Take 1 tablet (600 mg total) by mouth every 8 (eight) hours as needed for mild pain   lidocaine (Lidoderm) 5 %  Self No No   Sig: Apply 1 patch topically over 12 hours daily Remove & Discard patch within 12 hours or as directed by MD   losartan (COZAAR) 100 MG tablet  Self No No   Sig: Take 1 tablet (100 mg total) by mouth daily   metoprolol tartrate (LOPRESSOR) 100 mg tablet  Self No No   Sig: Take 1 tablet (100 mg total) by mouth 2 (two) times a day   pantoprazole (PROTONIX) 40 mg tablet  Self No No   Sig: Take 1 tablet by mouth once daily   polyethylene glycol (GLYCOLAX) 17 GM/SCOOP powder  Self No No   Sig: Take 17 g by mouth daily as needed (for constipation)      Facility-Administered Medications: None     Patient's Medications   Discharge Prescriptions    POLYETHYLENE GLYCOL (GLYCOLAX) 17 GM/SCOOP POWDER    Take 17 g by mouth every 30 (thirty) minutes Until you produce a bowel movement.       Start Date: 1/18/2025 End Date: --       Order Dose: 17 g       Quantity: 43232 g    Refills: 0     No discharge procedures on file.  ED SEPSIS DOCUMENTATION   Time reflects when diagnosis was documented in both MDM as applicable and  the Disposition within this note       Time User Action Codes Description Comment    1/18/2025  5:20 PM Lupe Chan [W19.XXXA] Fall from standing, initial encounter     1/18/2025  5:20 PM Lupe Chan [M54.9] Back pain     1/18/2025  5:20 PM Lupe Chan [K59.00] Constipation     1/18/2025  5:23 PM Lupe Chan [R91.1] Pulmonary nodule     1/18/2025  5:24 PM Lupe Chan [D35.00] Adrenal adenoma                  Lupe Chan PA-C  01/18/25 [id number]

## 2025-01-20 ENCOUNTER — TELEPHONE (OUTPATIENT)
Age: 75
End: 2025-01-20

## 2025-01-20 NOTE — TELEPHONE ENCOUNTER
PA cyclobenzaprine (FLEXERIL) 5 mg SUBMITTED     to [email or web address]     via    []CMM-KEY:    [x]Surescripts-Case ID # [id number]   []Availity-Auth ID #  NDC #    []Faxed to plan   []Other website    []Phone call Case ID #      []PA sent as URGENT    All office notes, labs and other pertaining documents and studies sent. Clinical questions answered. Awaiting determination from insurance company.     Turnaround time for your insurance to make a decision on your Prior Authorization can take 7-21 business days.

## 2025-02-11 DIAGNOSIS — M79.89 LEG SWELLING: ICD-10-CM

## 2025-02-11 RX ORDER — FUROSEMIDE 20 MG/1
20 TABLET ORAL DAILY
Qty: 90 TABLET | Refills: 0 | OUTPATIENT
Start: 2025-02-11

## 2025-02-24 ENCOUNTER — TELEPHONE (OUTPATIENT)
Age: 75
End: 2025-02-24

## 2025-02-24 DIAGNOSIS — Z87.11 HISTORY OF GASTRIC ULCER: ICD-10-CM

## 2025-02-24 DIAGNOSIS — D50.9 IRON DEFICIENCY ANEMIA, UNSPECIFIED IRON DEFICIENCY ANEMIA TYPE: ICD-10-CM

## 2025-02-24 RX ORDER — PANTOPRAZOLE SODIUM 40 MG/1
40 TABLET, DELAYED RELEASE ORAL DAILY
Qty: 30 TABLET | Refills: 0 | Status: SHIPPED | OUTPATIENT
Start: 2025-02-24

## 2025-03-23 DIAGNOSIS — D50.9 IRON DEFICIENCY ANEMIA, UNSPECIFIED IRON DEFICIENCY ANEMIA TYPE: ICD-10-CM

## 2025-03-23 DIAGNOSIS — Z87.11 HISTORY OF GASTRIC ULCER: ICD-10-CM

## 2025-03-24 RX ORDER — PANTOPRAZOLE SODIUM 40 MG/1
40 TABLET, DELAYED RELEASE ORAL DAILY
Qty: 30 TABLET | Refills: 0 | Status: SHIPPED | OUTPATIENT
Start: 2025-03-24

## 2025-03-25 DIAGNOSIS — I10 ESSENTIAL HYPERTENSION: ICD-10-CM

## 2025-03-25 RX ORDER — NIFEDIPINE 30 MG/1
30 TABLET, EXTENDED RELEASE ORAL DAILY
Qty: 30 TABLET | Refills: 0 | OUTPATIENT
Start: 2025-03-25

## 2025-04-01 DIAGNOSIS — I10 ESSENTIAL HYPERTENSION: ICD-10-CM

## 2025-04-01 RX ORDER — NIFEDIPINE 30 MG/1
30 TABLET, EXTENDED RELEASE ORAL DAILY
Qty: 30 TABLET | Refills: 0 | Status: SHIPPED | OUTPATIENT
Start: 2025-04-01

## 2025-04-19 DIAGNOSIS — Z87.11 HISTORY OF GASTRIC ULCER: ICD-10-CM

## 2025-04-19 DIAGNOSIS — D50.9 IRON DEFICIENCY ANEMIA, UNSPECIFIED IRON DEFICIENCY ANEMIA TYPE: ICD-10-CM

## 2025-04-21 RX ORDER — PANTOPRAZOLE SODIUM 40 MG/1
40 TABLET, DELAYED RELEASE ORAL DAILY
Qty: 30 TABLET | Refills: 0 | Status: SHIPPED | OUTPATIENT
Start: 2025-04-21

## 2025-04-29 ENCOUNTER — OFFICE VISIT (OUTPATIENT)
Dept: INTERNAL MEDICINE CLINIC | Facility: CLINIC | Age: 75
End: 2025-04-29
Payer: COMMERCIAL

## 2025-04-29 VITALS
BODY MASS INDEX: 39.39 KG/M2 | WEIGHT: 251 LBS | HEIGHT: 67 IN | DIASTOLIC BLOOD PRESSURE: 76 MMHG | HEART RATE: 56 BPM | SYSTOLIC BLOOD PRESSURE: 124 MMHG | OXYGEN SATURATION: 94 %

## 2025-04-29 DIAGNOSIS — R29.90 STROKE-LIKE SYMPTOMS: ICD-10-CM

## 2025-04-29 DIAGNOSIS — I10 ESSENTIAL HYPERTENSION: ICD-10-CM

## 2025-04-29 DIAGNOSIS — E78.2 MIXED HYPERLIPIDEMIA: ICD-10-CM

## 2025-04-29 DIAGNOSIS — F32.A ANXIETY AND DEPRESSION: ICD-10-CM

## 2025-04-29 DIAGNOSIS — Z00.00 ENCOUNTER FOR SUBSEQUENT ANNUAL WELLNESS VISIT (AWV) IN MEDICARE PATIENT: ICD-10-CM

## 2025-04-29 DIAGNOSIS — G89.4 CHRONIC PAIN SYNDROME: ICD-10-CM

## 2025-04-29 DIAGNOSIS — F41.9 ANXIETY AND DEPRESSION: ICD-10-CM

## 2025-04-29 DIAGNOSIS — M79.89 LEG SWELLING: ICD-10-CM

## 2025-04-29 DIAGNOSIS — I10 PRIMARY HYPERTENSION: Primary | ICD-10-CM

## 2025-04-29 PROCEDURE — G0439 PPPS, SUBSEQ VISIT: HCPCS | Performed by: INTERNAL MEDICINE

## 2025-04-29 PROCEDURE — G2211 COMPLEX E/M VISIT ADD ON: HCPCS | Performed by: INTERNAL MEDICINE

## 2025-04-29 PROCEDURE — 99213 OFFICE O/P EST LOW 20 MIN: CPT | Performed by: INTERNAL MEDICINE

## 2025-04-29 RX ORDER — ATORVASTATIN CALCIUM 40 MG/1
40 TABLET, FILM COATED ORAL EVERY EVENING
Qty: 90 TABLET | Refills: 1 | Status: SHIPPED | OUTPATIENT
Start: 2025-04-29

## 2025-04-29 RX ORDER — LOSARTAN POTASSIUM 100 MG/1
100 TABLET ORAL DAILY
Qty: 90 TABLET | Refills: 1 | Status: SHIPPED | OUTPATIENT
Start: 2025-04-29

## 2025-04-29 RX ORDER — NIFEDIPINE 30 MG/1
30 TABLET, EXTENDED RELEASE ORAL DAILY
Qty: 90 TABLET | Refills: 1 | Status: SHIPPED | OUTPATIENT
Start: 2025-04-29

## 2025-04-29 RX ORDER — FUROSEMIDE 20 MG/1
20 TABLET ORAL DAILY
Qty: 90 TABLET | Refills: 1 | Status: SHIPPED | OUTPATIENT
Start: 2025-04-29

## 2025-04-29 RX ORDER — DULOXETIN HYDROCHLORIDE 30 MG/1
30 CAPSULE, DELAYED RELEASE ORAL DAILY
Qty: 90 CAPSULE | Refills: 1 | Status: SHIPPED | OUTPATIENT
Start: 2025-04-29

## 2025-04-29 RX ORDER — AMLODIPINE BESYLATE 10 MG/1
10 TABLET ORAL DAILY
COMMUNITY
End: 2025-04-29 | Stop reason: SDUPTHER

## 2025-04-29 RX ORDER — AMLODIPINE BESYLATE 10 MG/1
10 TABLET ORAL DAILY
Qty: 90 TABLET | Refills: 1 | Status: SHIPPED | OUTPATIENT
Start: 2025-04-29

## 2025-04-29 RX ORDER — CITALOPRAM HYDROBROMIDE 40 MG/1
40 TABLET ORAL DAILY
Qty: 90 TABLET | Refills: 1 | Status: SHIPPED | OUTPATIENT
Start: 2025-04-29

## 2025-04-29 NOTE — PROGRESS NOTES
Name: Karin Nieves      : 1950      MRN: [id number]  Encounter Provider: Radha Estrada MD  Encounter Date: 2025   Encounter department: [facility] INTERNAL MEDICINE  :  Assessment & Plan  Primary hypertension  Asymptomatic blood pressure very well-controlled    Agree continue main medication for medical Procardia XL 2030 mg daily    Losartan 100 mg daily  Metoprolol 100 mg daily  Low-salt diet awaiting repeat BMP urine analysis asymptomatic  Orders:  •  amLODIPine (NORVASC) 10 mg tablet; Take 1 tablet (10 mg total) by mouth daily    Essential hypertension  Same as above  Orders:  •  losartan (COZAAR) 100 MG tablet; Take 1 tablet (100 mg total) by mouth daily  •  NIFEdipine (PROCARDIA XL) 30 mg 24 hr tablet; Take 1 tablet (30 mg total) by mouth daily    Leg swelling  Much improved continue Lasix 20 mg daily  Orders:  •  furosemide (LASIX) 20 mg tablet; Take 1 tablet (20 mg total) by mouth daily    Anxiety and depression  Depression Screening Follow-up Plan: Patient's depression screening was positive with a PHQ-9 score of 8. Patient advised to follow-up with PCP for further management.    Orders:  •  citalopram (CeleXA) 40 mg tablet; Take 1 tablet (40 mg total) by mouth daily  •  DULoxetine (Cymbalta) 30 mg delayed release capsule; Take 1 capsule (30 mg total) by mouth daily    Essential hypertension    Orders:  •  losartan (COZAAR) 100 MG tablet; Take 1 tablet (100 mg total) by mouth daily  •  NIFEdipine (PROCARDIA XL) 30 mg 24 hr tablet; Take 1 tablet (30 mg total) by mouth daily    Mixed hyperlipidemia  Previous LDL reviewed    Agree continuing medications for next Lipitor 40 mg daily awaiting repeat lipid profile  Orders:  •  atorvastatin (LIPITOR) 40 mg tablet; Take 1 tablet (40 mg total) by mouth every evening    Encounter for subsequent annual wellness visit (AWV) in Medicare patient           Depression Screening and Follow-up Plan: Patient's depression screening was  positive with a PHQ-9 score of 8.   Patient assessed for underlying major depression. Brief counseling provided and recommend additional follow-up/re-evaluation next office visit. Patient declines further evaluation by mental health professional and/or medications. Brief counseling provided. Will re-evaluate at next office visit. Patient advised to follow-up with PCP for further management.     Preventive health issues were discussed with patient, and age appropriate screening tests were ordered as noted in patient's After Visit Summary. Personalized health advice and appropriate referrals for health education or preventive services given if needed, as noted in patient's After Visit Summary.    History of Present Illness     Follow-up chronic medical condition list visit diagnosis overall health unchanged denies any chest pain difficulty breathing no new symptoms were supposed to have a blood work done noncompliant reminded again awaiting blood work for details refer to assessment plan visit diagnosis    Complete annual wellness exam done for counseling screening follow-up recommendations see attached encounter age-appropriate screening done preventive measure discussed for counseling screening follow-up recommendations see attached encounter and discharge instruction     Patient Care Team:  Radha Estrada MD as PCP - General (Internal Medicine)  Radha Estrada MD as PCP - PCP-[facility] ([facility])  Radha Estrada MD as PCP - PCP-[facility] ([facility])  MD Surendra Narvaez MD Chatargy Kaza, MD as Endoscopist    Review of Systems   Constitutional:  Positive for activity change and fatigue. Negative for appetite change, chills, diaphoresis, fever and unexpected weight change.   HENT:  Negative for congestion, dental problem, drooling, ear discharge, ear pain, facial swelling, hearing loss, mouth sores, nosebleeds, postnasal drip, rhinorrhea, sinus pressure, sneezing, sore throat, tinnitus,  trouble swallowing and voice change.    Eyes:  Negative for photophobia, pain, discharge, redness, itching and visual disturbance.   Respiratory:  Negative for apnea, cough, choking, chest tightness, shortness of breath, wheezing and stridor.    Cardiovascular:  Negative for chest pain, palpitations and leg swelling.   Gastrointestinal:  Negative for abdominal distention, abdominal pain, anal bleeding, blood in stool, constipation, diarrhea, nausea, rectal pain and vomiting.   Endocrine: Negative for cold intolerance, heat intolerance, polydipsia, polyphagia and polyuria.   Genitourinary:  Negative for decreased urine volume, difficulty urinating, dysuria, enuresis, flank pain, frequency, genital sores, hematuria and urgency.   Musculoskeletal:  Positive for arthralgias, back pain, gait problem and joint swelling. Negative for myalgias, neck pain and neck stiffness.   Skin:  Negative for color change, pallor, rash and wound.   Allergic/Immunologic: Negative.  Negative for environmental allergies, food allergies and immunocompromised state.   Neurological:  Negative for dizziness, tremors, seizures, syncope, facial asymmetry, speech difficulty, weakness, light-headedness, numbness and headaches.   Psychiatric/Behavioral:  Negative for agitation, behavioral problems, confusion, decreased concentration, dysphoric mood, hallucinations, self-injury, sleep disturbance and suicidal ideas. The patient is nervous/anxious. The patient is not hyperactive.      Medical History Reviewed by provider this encounter:  Tobacco  Allergies  Meds  Problems  Med Hx  Surg Hx  Fam Hx       Annual Wellness Visit Questionnaire   Karin is here for her Subsequent Wellness visit.     Health Risk Assessment:   Patient rates overall health as fair. Patient feels that their physical health rating is much worse. Patient is satisfied with their life. Eyesight was rated as much worse. Hearing was rated as same. Patient feels that their  emotional and mental health rating is same. Patients states they are never, rarely angry. Patient states they are often unusually tired/fatigued. Pain experienced in the last 7 days has been a lot. Patient's pain rating has been 8/10. Patient states that she has experienced no weight loss or gain in last 6 months. Has back pain    Depression Screening:   PHQ-9 Score: 8      Fall Risk Screening:   In the past year, patient has experienced: history of falling in past year    Number of falls: 2 or more  Injured during fall?: Yes    Feels unsteady when standing or walking?: Yes    Worried about falling?: Yes      Urinary Incontinence Screening:   Patient has leaked urine accidently in the last six months. Wears depends    Home Safety:  Patient has trouble with stairs inside or outside of their home. Patient has working smoke alarms and has working carbon monoxide detector. Home safety hazards include: none. Doesn't use the stairs    Nutrition:   Current diet is Regular. Tries to stay away from the salt    Medications:   Patient is currently taking over-the-counter supplements. OTC medications include: see medication list. Patient is able to manage medications.     Activities of Daily Living (ADLs)/Instrumental Activities of Daily Living (IADLs):   Walk and transfer into and out of bed and chair?: Yes  Dress and groom yourself?: Yes    Bathe or shower yourself?: Yes    Feed yourself? Yes  Do your laundry/housekeeping?: Yes  Manage your money, pay your bills and track your expenses?: Yes  Make your own meals?: Yes    Do your own shopping?: Yes    Previous Hospitalizations:   Any hospitalizations or ED visits within the last 12 months?: Yes    How many hospitalizations have you had in the last year?: 1-2    Hospitalization Comments: From a fall    Advance Care Planning:   Living will: No    Durable POA for healthcare: No    Advanced directive: No    Advanced directive counseling given: Yes    ACP document given: Yes     Patient declined ACP directive: No    End of Life Decisions reviewed with patient: Yes    Provider agrees with end of life decisions: Yes      Preventive Screenings      Cardiovascular Screening:    General: Screening Not Indicated and History Lipid Disorder      Diabetes Screening:     General: Screening Current      Colorectal Cancer Screening:     General: Screening Current      Breast Cancer Screening:     General: Screening Current      Cervical Cancer Screening:    General: Screening Not Indicated      Lung Cancer Screening:     General: Screening Not Indicated      Hepatitis C Screening:    General: Screening Current    Immunizations:  - Immunizations due: Influenza, Prevnar 20 and Zoster (Shingrix)    Screening, Brief Intervention, and Referral to Treatment (SBIRT)     Screening      AUDIT-C Screenin) How often did you have a drink containing alcohol in the past year? never  2) How many drinks did you have on a typical day when you were drinking in the past year? 0  3) How often did you have 6 or more drinks on one occasion in the past year? never    AUDIT-C Score: 0  Interpretation: Score 0-2 (female): Negative screen for alcohol misuse    SDOH Risk Assessment  Social determinants of health (SDOH) risk assesment tool was completed. The tool at a minimum covered housing stability, food insecurity, transportation needs, and utility difficulty. Patient had at risk responses for the following SDOH domains: housing stability and utilities.     Other Counseling Topics:   Skin self-exam and calcium and vitamin D intake.     Social Drivers of Health     Financial Resource Strain: Low Risk  (2024)    Overall Financial Resource Strain (CARDIA)    • Difficulty of Paying Living Expenses: Not very hard   Food Insecurity: No Food Insecurity (2025)    Hunger Vital Sign    • Worried About Running Out of Food in the Last Year: Never true    • Ran Out of Food in the Last Year: Never true   Transportation  "Needs: No Transportation Needs (4/29/2025)    PRAPARE - Transportation    • Lack of Transportation (Medical): No    • Lack of Transportation (Non-Medical): No   Housing Stability: High Risk (4/29/2025)    Housing Stability Vital Sign    • Unable to Pay for Housing in the Last Year: Yes    • Number of Times Moved in the Last Year: 0    • Homeless in the Last Year: No   Utilities: At Risk (4/29/2025)    [facility] Utilities    • Threatened with loss of utilities: Yes     No results found.    Objective   /76   Pulse 56   Ht 5' 6.5\" (1.689 m)   Wt 114 kg (251 lb)   SpO2 94%   BMI 39.91 kg/m²     Physical Exam  Vitals and nursing note reviewed.   Constitutional:       General: She is not in acute distress.     Appearance: She is well-developed. She is obese. She is not ill-appearing, toxic-appearing or diaphoretic.   HENT:      Head: Normocephalic and atraumatic.      Right Ear: External ear normal.      Left Ear: External ear normal.      Nose: Nose normal.      Mouth/Throat:      Pharynx: No oropharyngeal exudate.   Eyes:      General: Lids are normal. Lids are everted, no foreign bodies appreciated. No scleral icterus.        Right eye: No discharge.         Left eye: No discharge.      Conjunctiva/sclera: Conjunctivae normal.      Pupils: Pupils are equal, round, and reactive to light.   Neck:      Thyroid: No thyromegaly.      Vascular: Normal carotid pulses. No carotid bruit, hepatojugular reflux or JVD.      Trachea: No tracheal tenderness or tracheal deviation.   Cardiovascular:      Rate and Rhythm: Normal rate and regular rhythm.      Pulses: Normal pulses.      Heart sounds: Murmur heard.      No friction rub. No gallop.   Pulmonary:      Effort: Pulmonary effort is normal. No respiratory distress.      Breath sounds: Normal breath sounds. No stridor. No wheezing or rales.   Chest:      Chest wall: No tenderness.   Abdominal:      General: Bowel sounds are normal. There is no distension.      Palpations: " Abdomen is soft. There is no mass.      Tenderness: There is no abdominal tenderness. There is no guarding or rebound.   Musculoskeletal:         General: No tenderness or deformity. Normal range of motion.      Cervical back: Normal range of motion and neck supple. No edema, erythema or rigidity. No spinous process tenderness or muscular tenderness. Normal range of motion.   Lymphadenopathy:      Head:      Right side of head: No submental, submandibular, tonsillar, preauricular or posterior auricular adenopathy.      Left side of head: No submental, submandibular, tonsillar, preauricular, posterior auricular or occipital adenopathy.      Cervical: No cervical adenopathy.      Right cervical: No superficial, deep or posterior cervical adenopathy.     Left cervical: No superficial, deep or posterior cervical adenopathy.      Upper Body:      Right upper body: No pectoral adenopathy.      Left upper body: No pectoral adenopathy.   Skin:     General: Skin is warm and dry.      Coloration: Skin is not pale.      Findings: No erythema or rash.   Neurological:      General: No focal deficit present.      Mental Status: She is alert and oriented to person, place, and time.      Cranial Nerves: No cranial nerve deficit.      Sensory: No sensory deficit.      Motor: No tremor, abnormal muscle tone or seizure activity.      Coordination: Coordination normal.      Gait: Gait abnormal.      Deep Tendon Reflexes: Reflexes are normal and symmetric. Reflexes normal.   Psychiatric:         Behavior: Behavior normal.         Thought Content: Thought content normal.         Judgment: Judgment normal.

## 2025-04-29 NOTE — ASSESSMENT & PLAN NOTE
Same as above  Orders:  •  losartan (COZAAR) 100 MG tablet; Take 1 tablet (100 mg total) by mouth daily  •  NIFEdipine (PROCARDIA XL) 30 mg 24 hr tablet; Take 1 tablet (30 mg total) by mouth daily

## 2025-04-29 NOTE — ASSESSMENT & PLAN NOTE
Orders:  •  DULoxetine (Cymbalta) 30 mg delayed release capsule; Take 1 capsule (30 mg total) by mouth daily

## 2025-04-29 NOTE — PATIENT INSTRUCTIONS
Medicare Preventive Visit Patient Instructions  Thank you for completing your Welcome to Medicare Visit or Medicare Annual Wellness Visit today. Your next wellness visit will be due in one year (4/30/2026).  The screening/preventive services that you may require over the next 5-10 years are detailed below. Some tests may not apply to you based off risk factors and/or age. Screening tests ordered at today's visit but not completed yet may show as past due. Also, please note that scanned in results may not display below.  Preventive Screenings:  Service Recommendations Previous Testing/Comments   Colorectal Cancer Screening  * Colonoscopy    * Fecal Occult Blood Test (FOBT)/Fecal Immunochemical Test (FIT)  * Fecal DNA/Cologuard Test  * Flexible Sigmoidoscopy Age: 45-75 years old   Colonoscopy: every 10 years (may be performed more frequently if at higher risk)  OR  FOBT/FIT: every 1 year  OR  Cologuard: every 3 years  OR  Sigmoidoscopy: every 5 years  Screening may be recommended earlier than age 45 if at higher risk for colorectal cancer. Also, an individualized decision between you and your healthcare provider will decide whether screening between the ages of 76-85 would be appropriate. Colonoscopy: 12/22/2023  FOBT/FIT: Not on file  Cologuard: Not on file  Sigmoidoscopy: Not on file          Breast Cancer Screening Age: 40+ years old  Frequency: every 1-2 years  Not required if history of left and right mastectomy Mammogram: 09/06/2023        Cervical Cancer Screening Between the ages of 21-29, pap smear recommended once every 3 years.   Between the ages of 30-65, can perform pap smear with HPV co-testing every 5 years.   Recommendations may differ for women with a history of total hysterectomy, cervical cancer, or abnormal pap smears in past. Pap Smear: Not on file        Hepatitis C Screening Once for adults born between 1945 and 1965  More frequently in patients at high risk for Hepatitis C Hep C Antibody:  05/16/2023        Diabetes Screening 1-2 times per year if you're at risk for diabetes or have pre-diabetes Fasting glucose: 121 mg/dL (8/23/2024)  A1C: 5.9 % (8/6/2024)      Cholesterol Screening Once every 5 years if you don't have a lipid disorder. May order more often based on risk factors. Lipid panel: 08/23/2024          Other Preventive Screenings Covered by Medicare:  Abdominal Aortic Aneurysm (AAA) Screening: covered once if your at risk. You're considered to be at risk if you have a family history of AAA.  Lung Cancer Screening: covers low dose CT scan once per year if you meet all of the following conditions: (1) Age 55-77; (2) No signs or symptoms of lung cancer; (3) Current smoker or have quit smoking within the last 15 years; (4) You have a tobacco smoking history of at least 20 pack years (packs per day multiplied by number of years you smoked); (5) You get a written order from a healthcare provider.  Glaucoma Screening: covered annually if you're considered high risk: (1) You have diabetes OR (2) Family history of glaucoma OR (3)  aged 50 and older OR (4)  American aged 65 and older  Osteoporosis Screening: covered every 2 years if you meet one of the following conditions: (1) You're estrogen deficient and at risk for osteoporosis based off medical history and other findings; (2) Have a vertebral abnormality; (3) On glucocorticoid therapy for more than 3 months; (4) Have primary hyperparathyroidism; (5) On osteoporosis medications and need to assess response to drug therapy.   Last bone density test (DXA Scan): 09/06/2023.  HIV Screening: covered annually if you're between the age of 15-65. Also covered annually if you are younger than 15 and older than 65 with risk factors for HIV infection. For pregnant patients, it is covered up to 3 times per pregnancy.    Immunizations:  Immunization Recommendations   Influenza Vaccine Annual influenza vaccination during flu season is  recommended for all persons aged >= 6 months who do not have contraindications   Pneumococcal Vaccine   * Pneumococcal conjugate vaccine = PCV13 (Prevnar 13), PCV15 (Vaxneuvance), PCV20 (Prevnar 20)  * Pneumococcal polysaccharide vaccine = PPSV23 (Pneumovax) Adults 19-65 yo with certain risk factors or if 65+ yo  If never received any pneumonia vaccine: recommend Prevnar 20 (PCV20)  Give PCV20 if previously received 1 dose of PCV13 or PPSV23   Hepatitis B Vaccine 3 dose series if at intermediate or high risk (ex: diabetes, end stage renal disease, liver disease)   Respiratory syncytial virus (RSV) Vaccine - COVERED BY MEDICARE PART D  * RSVPreF3 (Arexvy) CDC recommends that adults 60 years of age and older may receive a single dose of RSV vaccine using shared clinical decision-making (SCDM)   Tetanus (Td) Vaccine - COST NOT COVERED BY MEDICARE PART B Following completion of primary series, a booster dose should be given every 10 years to maintain immunity against tetanus. Td may also be given as tetanus wound prophylaxis.   Tdap Vaccine - COST NOT COVERED BY MEDICARE PART B Recommended at least once for all adults. For pregnant patients, recommended with each pregnancy.   Shingles Vaccine (Shingrix) - COST NOT COVERED BY MEDICARE PART B  2 shot series recommended in those 19 years and older who have or will have weakened immune systems or those 50 years and older     Health Maintenance Due:      Topic Date Due   • Breast Cancer Screening: Mammogram  09/06/2024   • Colorectal Cancer Screening  12/20/2028   • Hepatitis C Screening  Completed     Immunizations Due:      Topic Date Due   • Influenza Vaccine (1) 09/01/2024   • COVID-19 Vaccine (3 - 2024-25 season) 09/01/2024   • Pneumococcal Vaccine: 65+ Years (2 of 2 - PCV) 09/11/2024     Advance Directives   What are advance directives?  Advance directives are legal documents that state your wishes and plans for medical care. These plans are made ahead of time in case  you lose your ability to make decisions for yourself. Advance directives can apply to any medical decision, such as the treatments you want, and if you want to donate organs.   What are the types of advance directives?  There are many types of advance directives, and each state has rules about how to use them. You may choose a combination of any of the following:  Living will:  This is a written record of the treatment you want. You can also choose which treatments you do not want, which to limit, and which to stop at a certain time. This includes surgery, medicine, IV fluid, and tube feedings.   Durable power of  for healthcare (DPAHC):  This is a written record that states who you want to make healthcare choices for you when you are unable to make them for yourself. This person, called a proxy, is usually a family member or a friend. You may choose more than 1 proxy.  Do not resuscitate (DNR) order:  A DNR order is used in case your heart stops beating or you stop breathing. It is a request not to have certain forms of treatment, such as CPR. A DNR order may be included in other types of advance directives.  Medical directive:  This covers the care that you want if you are in a coma, near death, or unable to make decisions for yourself. You can list the treatments you want for each condition. Treatment may include pain medicine, surgery, blood transfusions, dialysis, IV or tube feedings, and a ventilator (breathing machine).  Values history:  This document has questions about your views, beliefs, and how you feel and think about life. This information can help others choose the care that you would choose.  Why are advance directives important?  An advance directive helps you control your care. Although spoken wishes may be used, it is better to have your wishes written down. Spoken wishes can be misunderstood, or not followed. Treatments may be given even if you do not want them. An advance directive may  make it easier for your family to make difficult choices about your care.   Urinary Incontinence   Urinary incontinence (UI)  is when you lose control of your bladder. UI develops because your bladder cannot store or empty urine properly. The 3 most common types of UI are stress incontinence, urge incontinence, or both.  Medicines:   May be given to help strengthen your bladder control. Report any side effects of medication to your healthcare provider.  Do pelvic muscle exercises often:  Your pelvic muscles help you stop urinating. Squeeze these muscles tight for 5 seconds, then relax for 5 seconds. Gradually work up to squeezing for 10 seconds. Do 3 sets of 15 repetitions a day, or as directed. This will help strengthen your pelvic muscles and improve bladder control.  Train your bladder:  Go to the bathroom at set times, such as every 2 hours, even if you do not feel the urge to go. You can also try to hold your urine when you feel the urge to go. For example, hold your urine for 5 minutes when you feel the urge to go. As that becomes easier, hold your urine for 10 minutes.   Self-care:   Keep a UI record.  Write down how often you leak urine and how much you leak. Make a note of what you were doing when you leaked urine.  Drink liquids as directed. You may need to limit the amount of liquid you drink to help control your urine leakage. Do not drink any liquid right before you go to bed. Limit or do not have drinks that contain caffeine or alcohol.   Prevent constipation.  Eat a variety of high-fiber foods. Good examples are high-fiber cereals, beans, vegetables, and whole-grain breads. Walking is the best way to trigger your intestines to have a bowel movement.  Exercise regularly and maintain a healthy weight.  Weight loss and exercise will decrease pressure on your bladder and help you control your leakage.   Use a catheter as directed  to help empty your bladder. A catheter is a tiny, plastic tube that is put  into your bladder to drain your urine.   Go to behavior therapy as directed.  Behavior therapy may be used to help you learn to control your urge to urinate.    Weight Management   Why it is important to manage your weight:  Being overweight increases your risk of health conditions such as heart disease, high blood pressure, type 2 diabetes, and certain types of cancer. It can also increase your risk for osteoarthritis, sleep apnea, and other respiratory problems. Aim for a slow, steady weight loss. Even a small amount of weight loss can lower your risk of health problems.  How to lose weight safely:  A safe and healthy way to lose weight is to eat fewer calories and get regular exercise. You can lose up about 1 pound a week by decreasing the number of calories you eat by 500 calories each day.   Healthy meal plan for weight management:  A healthy meal plan includes a variety of foods, contains fewer calories, and helps you stay healthy. A healthy meal plan includes the following:  Eat whole-grain foods more often.  A healthy meal plan should contain fiber. Fiber is the part of grains, fruits, and vegetables that is not broken down by your body. Whole-grain foods are healthy and provide extra fiber in your diet. Some examples of whole-grain foods are whole-wheat breads and pastas, oatmeal, brown rice, and bulgur.  Eat a variety of vegetables every day.  Include dark, leafy greens such as spinach, kale, bita greens, and mustard greens. Eat yellow and orange vegetables such as carrots, sweet potatoes, and winter squash.   Eat a variety of fruits every day.  Choose fresh or canned fruit (canned in its own juice or light syrup) instead of juice. Fruit juice has very little or no fiber.  Eat low-fat dairy foods.  Drink fat-free (skim) milk or 1% milk. Eat fat-free yogurt and low-fat cottage cheese. Try low-fat cheeses such as mozzarella and other reduced-fat cheeses.  Choose meat and other protein foods that are low  in fat.  Choose beans or other legumes such as split peas or lentils. Choose fish, skinless poultry (chicken or turkey), or lean cuts of red meat (beef or pork). Before you cook meat or poultry, cut off any visible fat.   Use less fat and oil.  Try baking foods instead of frying them. Add less fat, such as margarine, sour cream, regular salad dressing and mayonnaise to foods. Eat fewer high-fat foods. Some examples of high-fat foods include french fries, doughnuts, ice cream, and cakes.  Eat fewer sweets.  Limit foods and drinks that are high in sugar. This includes candy, cookies, regular soda, and sweetened drinks.  Exercise:  Exercise at least 30 minutes per day on most days of the week. Some examples of exercise include walking, biking, dancing, and swimming. You can also fit in more physical activity by taking the stairs instead of the elevator or parking farther away from stores. Ask your healthcare provider about the best exercise plan for you.      © Copyright [employer] 2018 Information is for End User's use only and may not be sold, redistributed or otherwise used for commercial purposes. All illustrations and images included in CareNotes® are the copyrighted property of [employer] or charity: water

## 2025-04-29 NOTE — ASSESSMENT & PLAN NOTE
Previous LDL reviewed    Agree continuing medications for next Lipitor 40 mg daily awaiting repeat lipid profile  Orders:  •  atorvastatin (LIPITOR) 40 mg tablet; Take 1 tablet (40 mg total) by mouth every evening

## 2025-04-29 NOTE — ASSESSMENT & PLAN NOTE
Orders:  •  losartan (COZAAR) 100 MG tablet; Take 1 tablet (100 mg total) by mouth daily  •  NIFEdipine (PROCARDIA XL) 30 mg 24 hr tablet; Take 1 tablet (30 mg total) by mouth daily

## 2025-05-16 DIAGNOSIS — D50.9 IRON DEFICIENCY ANEMIA, UNSPECIFIED IRON DEFICIENCY ANEMIA TYPE: ICD-10-CM

## 2025-05-16 DIAGNOSIS — I10 ESSENTIAL HYPERTENSION: ICD-10-CM

## 2025-05-16 DIAGNOSIS — Z87.11 HISTORY OF GASTRIC ULCER: ICD-10-CM

## 2025-05-16 RX ORDER — PANTOPRAZOLE SODIUM 40 MG/1
40 TABLET, DELAYED RELEASE ORAL DAILY
Qty: 30 TABLET | Refills: 0 | Status: SHIPPED | OUTPATIENT
Start: 2025-05-16

## 2025-05-16 RX ORDER — NIFEDIPINE 30 MG/1
30 TABLET, EXTENDED RELEASE ORAL DAILY
Qty: 90 TABLET | Refills: 1 | Status: SHIPPED | OUTPATIENT
Start: 2025-05-16

## 2025-05-16 NOTE — TELEPHONE ENCOUNTER
NOT A DUPLICATE!  Pharmacy did not receive Rx    Reason for call:   [x] Refill   [] Prior Auth  [] Other:     Office:   [x] PCP/Provider - Natalie  [] Specialty/Provider -     Medication: Nifedipine 30mg    Dose/Frequency: 1 tab daily     Quantity: 90    Pharmacy: [facility] [address] [place] [address] [id number] [phone]     Local Pharmacy   Does the patient have enough for 3 days?   [] Yes   [x] No no meds for 2 weeks

## 2025-05-29 PROBLEM — Z00.00 ENCOUNTER FOR SUBSEQUENT ANNUAL WELLNESS VISIT (AWV) IN MEDICARE PATIENT: Status: RESOLVED | Noted: 2024-03-28 | Resolved: 2025-05-29

## 2025-06-13 DIAGNOSIS — Z87.11 HISTORY OF GASTRIC ULCER: ICD-10-CM

## 2025-06-13 DIAGNOSIS — D50.9 IRON DEFICIENCY ANEMIA, UNSPECIFIED IRON DEFICIENCY ANEMIA TYPE: ICD-10-CM

## 2025-06-13 RX ORDER — PANTOPRAZOLE SODIUM 40 MG/1
40 TABLET, DELAYED RELEASE ORAL DAILY
Qty: 30 TABLET | Refills: 0 | Status: SHIPPED | OUTPATIENT
Start: 2025-06-13

## 2025-07-02 NOTE — TELEPHONE ENCOUNTER
Patients daughter calling to have citalopram and losartan refilled. Made aware 90 day with 1 refill sent to pharmacy on 4/29. They will follow up with pharmacy

## 2025-07-11 DIAGNOSIS — D50.9 IRON DEFICIENCY ANEMIA, UNSPECIFIED IRON DEFICIENCY ANEMIA TYPE: ICD-10-CM

## 2025-07-11 DIAGNOSIS — Z87.11 HISTORY OF GASTRIC ULCER: ICD-10-CM

## 2025-07-11 RX ORDER — PANTOPRAZOLE SODIUM 40 MG/1
40 TABLET, DELAYED RELEASE ORAL DAILY
Qty: 30 TABLET | Refills: 0 | Status: SHIPPED | OUTPATIENT
Start: 2025-07-11

## (undated) DEVICE — CHLORAPREP APPLICATOR TINTED 10.5ML ONE-STEP

## (undated) DEVICE — SYRINGE 5ML LL

## (undated) DEVICE — TOWEL SET X-RAY

## (undated) DEVICE — NEEDLE SPINAL SHORT BEVEL 22GX6 IN STERILE

## (undated) DEVICE — SKIN MARKER DUAL TIP WITH RULER CAP, FLEXIBLE RULER AND LABELS: Brand: DEVON

## (undated) DEVICE — NEEDLE SPINAL 22G X 5IN QUINCKE

## (undated) DEVICE — SMALL NEEDLE COUNTER NEST

## (undated) DEVICE — GLOVE SRG BIOGEL 7.5

## (undated) DEVICE — WIPES BABY PAMPERS SENSITIVE 36/PK

## (undated) DEVICE — RADIOLOGY STERILE LABELS: Brand: CENTURION

## (undated) DEVICE — TRAY EPID CONT PERIFIX 18G X 3.5IN 5ML CLSD TIP DRAPE

## (undated) DEVICE — PLASTIC ADHESIVE BANDAGE: Brand: CURITY

## (undated) DEVICE — NEEDLE BLUNT 18 G X 1 1/2 W FILTER